# Patient Record
Sex: FEMALE | Race: WHITE | NOT HISPANIC OR LATINO | URBAN - METROPOLITAN AREA
[De-identification: names, ages, dates, MRNs, and addresses within clinical notes are randomized per-mention and may not be internally consistent; named-entity substitution may affect disease eponyms.]

---

## 2017-01-26 ENCOUNTER — OUTPATIENT (OUTPATIENT)
Dept: OUTPATIENT SERVICES | Facility: HOSPITAL | Age: 57
LOS: 1 days | Discharge: HOME | End: 2017-01-26

## 2017-01-26 DIAGNOSIS — C50.919 MALIGNANT NEOPLASM OF UNSPECIFIED SITE OF UNSPECIFIED FEMALE BREAST: ICD-10-CM

## 2017-01-29 ENCOUNTER — FORM ENCOUNTER (OUTPATIENT)
Age: 57
End: 2017-01-29

## 2017-01-29 ENCOUNTER — RESULT REVIEW (OUTPATIENT)
Age: 57
End: 2017-01-29

## 2017-01-30 ENCOUNTER — APPOINTMENT (OUTPATIENT)
Dept: INFUSION THERAPY | Facility: HOSPITAL | Age: 57
End: 2017-01-30

## 2017-01-30 ENCOUNTER — OTHER (OUTPATIENT)
Age: 57
End: 2017-01-30

## 2017-01-30 ENCOUNTER — APPOINTMENT (OUTPATIENT)
Dept: MRI IMAGING | Facility: IMAGING CENTER | Age: 57
End: 2017-01-30

## 2017-01-30 ENCOUNTER — OUTPATIENT (OUTPATIENT)
Dept: OUTPATIENT SERVICES | Facility: HOSPITAL | Age: 57
LOS: 1 days | End: 2017-01-30
Payer: COMMERCIAL

## 2017-01-30 DIAGNOSIS — C50.919 MALIGNANT NEOPLASM OF UNSPECIFIED SITE OF UNSPECIFIED FEMALE BREAST: ICD-10-CM

## 2017-01-30 LAB
HCT VFR BLD CALC: 34 % — LOW (ref 34.5–45)
HGB BLD-MCNC: 12.1 G/DL — SIGNIFICANT CHANGE UP (ref 11.5–15.5)
MCHC RBC-ENTMCNC: 31 PG — SIGNIFICANT CHANGE UP (ref 27–34)
MCHC RBC-ENTMCNC: 35.5 GM/DL — SIGNIFICANT CHANGE UP (ref 32–36)
MCV RBC AUTO: 87.4 FL — SIGNIFICANT CHANGE UP (ref 80–100)
PLATELET # BLD AUTO: 485 K/UL — HIGH (ref 150–400)
RBC # BLD: 3.9 M/UL — SIGNIFICANT CHANGE UP (ref 3.8–5.2)
RBC # FLD: 12.2 % — SIGNIFICANT CHANGE UP (ref 10.3–14.5)
WBC # BLD: 7.1 K/UL — SIGNIFICANT CHANGE UP (ref 3.8–10.5)
WBC # FLD AUTO: 7.1 K/UL — SIGNIFICANT CHANGE UP (ref 3.8–10.5)

## 2017-01-30 PROCEDURE — C8937: CPT

## 2017-01-30 PROCEDURE — A9585: CPT

## 2017-01-30 PROCEDURE — C8908: CPT

## 2017-01-31 DIAGNOSIS — M81.0 AGE-RELATED OSTEOPOROSIS WITHOUT CURRENT PATHOLOGICAL FRACTURE: ICD-10-CM

## 2017-01-31 DIAGNOSIS — M81.8 OTHER OSTEOPOROSIS WITHOUT CURRENT PATHOLOGICAL FRACTURE: ICD-10-CM

## 2017-10-20 ENCOUNTER — OUTPATIENT (OUTPATIENT)
Dept: OUTPATIENT SERVICES | Facility: HOSPITAL | Age: 57
LOS: 1 days | Discharge: HOME | End: 2017-10-20

## 2017-10-20 DIAGNOSIS — C50.919 MALIGNANT NEOPLASM OF UNSPECIFIED SITE OF UNSPECIFIED FEMALE BREAST: ICD-10-CM

## 2017-10-20 DIAGNOSIS — M81.0 AGE-RELATED OSTEOPOROSIS WITHOUT CURRENT PATHOLOGICAL FRACTURE: ICD-10-CM

## 2017-10-26 ENCOUNTER — APPOINTMENT (OUTPATIENT)
Dept: HEMATOLOGY ONCOLOGY | Facility: CLINIC | Age: 57
End: 2017-10-26
Payer: COMMERCIAL

## 2017-10-26 ENCOUNTER — RESULT REVIEW (OUTPATIENT)
Age: 57
End: 2017-10-26

## 2017-10-26 VITALS
WEIGHT: 116.4 LBS | OXYGEN SATURATION: 97 % | SYSTOLIC BLOOD PRESSURE: 136 MMHG | HEART RATE: 81 BPM | TEMPERATURE: 97.4 F | BODY MASS INDEX: 21.15 KG/M2 | HEIGHT: 62.28 IN | DIASTOLIC BLOOD PRESSURE: 79 MMHG | RESPIRATION RATE: 16 BRPM

## 2017-10-26 LAB
HCT VFR BLD CALC: 35.7 % — SIGNIFICANT CHANGE UP (ref 34.5–45)
HGB BLD-MCNC: 12.3 G/DL — SIGNIFICANT CHANGE UP (ref 11.5–15.5)
MCHC RBC-ENTMCNC: 31.9 PG — SIGNIFICANT CHANGE UP (ref 27–34)
MCHC RBC-ENTMCNC: 34.5 G/DL — SIGNIFICANT CHANGE UP (ref 32–36)
MCV RBC AUTO: 92.4 FL — SIGNIFICANT CHANGE UP (ref 80–100)
PLATELET # BLD AUTO: 205 K/UL — SIGNIFICANT CHANGE UP (ref 150–400)
RBC # BLD: 3.86 M/UL — SIGNIFICANT CHANGE UP (ref 3.8–5.2)
RBC # FLD: 11.5 % — SIGNIFICANT CHANGE UP (ref 10.3–14.5)
WBC # BLD: 9.2 K/UL — SIGNIFICANT CHANGE UP (ref 3.8–10.5)
WBC # FLD AUTO: 9.2 K/UL — SIGNIFICANT CHANGE UP (ref 3.8–10.5)

## 2017-10-26 PROCEDURE — 99214 OFFICE O/P EST MOD 30 MIN: CPT

## 2017-10-27 LAB
ALBUMIN SERPL ELPH-MCNC: 4.5 G/DL
ALP BLD-CCNC: 65 U/L
ALT SERPL-CCNC: 16 U/L
ANION GAP SERPL CALC-SCNC: 16 MMOL/L
AST SERPL-CCNC: 23 U/L
BILIRUB SERPL-MCNC: <0.2 MG/DL
BUN SERPL-MCNC: 13 MG/DL
CALCIUM SERPL-MCNC: 9.2 MG/DL
CANCER AG27-29 SERPL-ACNC: 32.9 U/ML
CEA SERPL-MCNC: 6.6 NG/ML
CHLORIDE SERPL-SCNC: 98 MMOL/L
CO2 SERPL-SCNC: 28 MMOL/L
CREAT SERPL-MCNC: 1.38 MG/DL
GLUCOSE SERPL-MCNC: 119 MG/DL
POTASSIUM SERPL-SCNC: 4.8 MMOL/L
PROT SERPL-MCNC: 7.3 G/DL
SODIUM SERPL-SCNC: 142 MMOL/L

## 2017-10-30 ENCOUNTER — APPOINTMENT (OUTPATIENT)
Dept: HEMATOLOGY ONCOLOGY | Facility: CLINIC | Age: 57
End: 2017-10-30

## 2017-10-30 ENCOUNTER — RESULT REVIEW (OUTPATIENT)
Age: 57
End: 2017-10-30

## 2017-10-30 LAB
HCT VFR BLD CALC: 38.2 % — SIGNIFICANT CHANGE UP (ref 34.5–45)
HGB BLD-MCNC: 13.5 G/DL — SIGNIFICANT CHANGE UP (ref 11.5–15.5)
MCHC RBC-ENTMCNC: 32.7 PG — SIGNIFICANT CHANGE UP (ref 27–34)
MCHC RBC-ENTMCNC: 35.3 G/DL — SIGNIFICANT CHANGE UP (ref 32–36)
MCV RBC AUTO: 92.6 FL — SIGNIFICANT CHANGE UP (ref 80–100)
PLATELET # BLD AUTO: 196 K/UL — SIGNIFICANT CHANGE UP (ref 150–400)
RBC # BLD: 4.13 M/UL — SIGNIFICANT CHANGE UP (ref 3.8–5.2)
RBC # FLD: 12 % — SIGNIFICANT CHANGE UP (ref 10.3–14.5)
WBC # BLD: 7.9 K/UL — SIGNIFICANT CHANGE UP (ref 3.8–10.5)
WBC # FLD AUTO: 7.9 K/UL — SIGNIFICANT CHANGE UP (ref 3.8–10.5)

## 2017-11-06 ENCOUNTER — FORM ENCOUNTER (OUTPATIENT)
Age: 57
End: 2017-11-06

## 2017-11-07 ENCOUNTER — OUTPATIENT (OUTPATIENT)
Dept: OUTPATIENT SERVICES | Facility: HOSPITAL | Age: 57
LOS: 1 days | End: 2017-11-07
Payer: COMMERCIAL

## 2017-11-07 ENCOUNTER — APPOINTMENT (OUTPATIENT)
Dept: CT IMAGING | Facility: IMAGING CENTER | Age: 57
End: 2017-11-07
Payer: COMMERCIAL

## 2017-11-07 DIAGNOSIS — C50.919 MALIGNANT NEOPLASM OF UNSPECIFIED SITE OF UNSPECIFIED FEMALE BREAST: ICD-10-CM

## 2017-11-07 PROCEDURE — 74177 CT ABD & PELVIS W/CONTRAST: CPT

## 2017-11-07 PROCEDURE — 74177 CT ABD & PELVIS W/CONTRAST: CPT | Mod: 26

## 2017-11-07 PROCEDURE — 71260 CT THORAX DX C+: CPT | Mod: 26

## 2017-11-07 PROCEDURE — 71260 CT THORAX DX C+: CPT

## 2018-03-27 LAB
ANION GAP SERPL CALC-SCNC: 14 MMOL/L
BUN SERPL-MCNC: 18 MG/DL
CALCIUM SERPL-MCNC: 10 MG/DL
CEA SERPL-MCNC: 6.6 NG/ML
CHLORIDE SERPL-SCNC: 99 MMOL/L
CO2 SERPL-SCNC: 27 MMOL/L
CREAT SERPL-MCNC: 1.1 MG/DL
GLUCOSE SERPL-MCNC: 98 MG/DL
POTASSIUM SERPL-SCNC: 4.4 MMOL/L
SODIUM SERPL-SCNC: 140 MMOL/L

## 2018-04-06 ENCOUNTER — OUTPATIENT (OUTPATIENT)
Dept: OUTPATIENT SERVICES | Facility: HOSPITAL | Age: 58
LOS: 1 days | Discharge: HOME | End: 2018-04-06

## 2018-04-06 DIAGNOSIS — C50.919 MALIGNANT NEOPLASM OF UNSPECIFIED SITE OF UNSPECIFIED FEMALE BREAST: ICD-10-CM

## 2018-04-10 ENCOUNTER — APPOINTMENT (OUTPATIENT)
Dept: INFUSION THERAPY | Facility: HOSPITAL | Age: 58
End: 2018-04-10

## 2018-04-11 DIAGNOSIS — M81.0 AGE-RELATED OSTEOPOROSIS WITHOUT CURRENT PATHOLOGICAL FRACTURE: ICD-10-CM

## 2018-06-13 ENCOUNTER — FORM ENCOUNTER (OUTPATIENT)
Age: 58
End: 2018-06-13

## 2018-06-14 ENCOUNTER — APPOINTMENT (OUTPATIENT)
Dept: MAMMOGRAPHY | Facility: IMAGING CENTER | Age: 58
End: 2018-06-14
Payer: COMMERCIAL

## 2018-06-14 ENCOUNTER — OUTPATIENT (OUTPATIENT)
Dept: OUTPATIENT SERVICES | Facility: HOSPITAL | Age: 58
LOS: 1 days | End: 2018-06-14
Payer: COMMERCIAL

## 2018-06-14 ENCOUNTER — APPOINTMENT (OUTPATIENT)
Dept: ULTRASOUND IMAGING | Facility: IMAGING CENTER | Age: 58
End: 2018-06-14
Payer: COMMERCIAL

## 2018-06-14 DIAGNOSIS — C50.919 MALIGNANT NEOPLASM OF UNSPECIFIED SITE OF UNSPECIFIED FEMALE BREAST: ICD-10-CM

## 2018-06-14 PROCEDURE — 76641 ULTRASOUND BREAST COMPLETE: CPT | Mod: 26,50

## 2018-06-14 PROCEDURE — G0279: CPT

## 2018-06-14 PROCEDURE — 77066 DX MAMMO INCL CAD BI: CPT | Mod: 26

## 2018-06-14 PROCEDURE — 77066 DX MAMMO INCL CAD BI: CPT

## 2018-06-14 PROCEDURE — 76641 ULTRASOUND BREAST COMPLETE: CPT

## 2018-06-14 PROCEDURE — G0279: CPT | Mod: 26

## 2019-01-30 ENCOUNTER — INPATIENT (INPATIENT)
Facility: HOSPITAL | Age: 59
LOS: 3 days | Discharge: HOME | DRG: 392 | End: 2019-02-03
Attending: HOSPITALIST | Admitting: HOSPITALIST
Payer: COMMERCIAL

## 2019-01-30 VITALS
RESPIRATION RATE: 18 BRPM | OXYGEN SATURATION: 97 % | DIASTOLIC BLOOD PRESSURE: 80 MMHG | TEMPERATURE: 98 F | SYSTOLIC BLOOD PRESSURE: 124 MMHG | HEART RATE: 88 BPM | WEIGHT: 110.01 LBS

## 2019-01-30 DIAGNOSIS — F17.200 NICOTINE DEPENDENCE, UNSPECIFIED, UNCOMPLICATED: ICD-10-CM

## 2019-01-30 DIAGNOSIS — N32.9 BLADDER DISORDER, UNSPECIFIED: ICD-10-CM

## 2019-01-30 DIAGNOSIS — K57.92 DIVERTICULITIS OF INTESTINE, PART UNSPECIFIED, WITHOUT PERFORATION OR ABSCESS WITHOUT BLEEDING: ICD-10-CM

## 2019-01-30 DIAGNOSIS — Z87.09 PERSONAL HISTORY OF OTHER DISEASES OF THE RESPIRATORY SYSTEM: ICD-10-CM

## 2019-01-30 LAB
ALBUMIN SERPL ELPH-MCNC: 4.4 G/DL — SIGNIFICANT CHANGE UP (ref 3.3–5)
ALP SERPL-CCNC: 74 U/L — SIGNIFICANT CHANGE UP (ref 40–120)
ALT FLD-CCNC: 12 U/L — SIGNIFICANT CHANGE UP (ref 10–45)
ANION GAP SERPL CALC-SCNC: 10 MMOL/L — SIGNIFICANT CHANGE UP (ref 5–17)
APPEARANCE UR: CLEAR — SIGNIFICANT CHANGE UP
AST SERPL-CCNC: 20 U/L — SIGNIFICANT CHANGE UP (ref 10–40)
BACTERIA # UR AUTO: NEGATIVE — SIGNIFICANT CHANGE UP
BASOPHILS # BLD AUTO: 0.1 K/UL — SIGNIFICANT CHANGE UP (ref 0–0.2)
BASOPHILS NFR BLD AUTO: 0.8 % — SIGNIFICANT CHANGE UP (ref 0–2)
BILIRUB SERPL-MCNC: 0.2 MG/DL — SIGNIFICANT CHANGE UP (ref 0.2–1.2)
BILIRUB UR-MCNC: NEGATIVE — SIGNIFICANT CHANGE UP
BUN SERPL-MCNC: 12 MG/DL — SIGNIFICANT CHANGE UP (ref 7–23)
CALCIUM SERPL-MCNC: 9.2 MG/DL — SIGNIFICANT CHANGE UP (ref 8.4–10.5)
CHLORIDE SERPL-SCNC: 99 MMOL/L — SIGNIFICANT CHANGE UP (ref 96–108)
CO2 SERPL-SCNC: 26 MMOL/L — SIGNIFICANT CHANGE UP (ref 22–31)
COLOR SPEC: SIGNIFICANT CHANGE UP
CREAT SERPL-MCNC: 1 MG/DL — SIGNIFICANT CHANGE UP (ref 0.5–1.3)
DIFF PNL FLD: NEGATIVE — SIGNIFICANT CHANGE UP
EOSINOPHIL # BLD AUTO: 0.3 K/UL — SIGNIFICANT CHANGE UP (ref 0–0.5)
EOSINOPHIL NFR BLD AUTO: 4 % — SIGNIFICANT CHANGE UP (ref 0–6)
EPI CELLS # UR: 1 /HPF — SIGNIFICANT CHANGE UP
GAS PNL BLDV: SIGNIFICANT CHANGE UP
GLUCOSE SERPL-MCNC: 110 MG/DL — HIGH (ref 70–99)
GLUCOSE UR QL: NEGATIVE — SIGNIFICANT CHANGE UP
HCT VFR BLD CALC: 37.7 % — SIGNIFICANT CHANGE UP (ref 34.5–45)
HGB BLD-MCNC: 13 G/DL — SIGNIFICANT CHANGE UP (ref 11.5–15.5)
HYALINE CASTS # UR AUTO: 0 /LPF — SIGNIFICANT CHANGE UP (ref 0–2)
KETONES UR-MCNC: NEGATIVE — SIGNIFICANT CHANGE UP
LEUKOCYTE ESTERASE UR-ACNC: NEGATIVE — SIGNIFICANT CHANGE UP
LIDOCAIN IGE QN: 38 U/L — SIGNIFICANT CHANGE UP (ref 7–60)
LYMPHOCYTES # BLD AUTO: 1.8 K/UL — SIGNIFICANT CHANGE UP (ref 1–3.3)
LYMPHOCYTES # BLD AUTO: 20.8 % — SIGNIFICANT CHANGE UP (ref 13–44)
MCHC RBC-ENTMCNC: 31.8 PG — SIGNIFICANT CHANGE UP (ref 27–34)
MCHC RBC-ENTMCNC: 34.5 GM/DL — SIGNIFICANT CHANGE UP (ref 32–36)
MCV RBC AUTO: 92.1 FL — SIGNIFICANT CHANGE UP (ref 80–100)
MONOCYTES # BLD AUTO: 0.8 K/UL — SIGNIFICANT CHANGE UP (ref 0–0.9)
MONOCYTES NFR BLD AUTO: 8.6 % — SIGNIFICANT CHANGE UP (ref 2–14)
NEUTROPHILS # BLD AUTO: 5.7 K/UL — SIGNIFICANT CHANGE UP (ref 1.8–7.4)
NEUTROPHILS NFR BLD AUTO: 65.8 % — SIGNIFICANT CHANGE UP (ref 43–77)
NITRITE UR-MCNC: NEGATIVE — SIGNIFICANT CHANGE UP
PH UR: 8 — SIGNIFICANT CHANGE UP (ref 5–8)
PLATELET # BLD AUTO: 239 K/UL — SIGNIFICANT CHANGE UP (ref 150–400)
POTASSIUM SERPL-MCNC: 4.1 MMOL/L — SIGNIFICANT CHANGE UP (ref 3.5–5.3)
POTASSIUM SERPL-SCNC: 4.1 MMOL/L — SIGNIFICANT CHANGE UP (ref 3.5–5.3)
PROT SERPL-MCNC: 7.4 G/DL — SIGNIFICANT CHANGE UP (ref 6–8.3)
PROT UR-MCNC: SIGNIFICANT CHANGE UP
RBC # BLD: 4.1 M/UL — SIGNIFICANT CHANGE UP (ref 3.8–5.2)
RBC # FLD: 11.7 % — SIGNIFICANT CHANGE UP (ref 10.3–14.5)
RBC CASTS # UR COMP ASSIST: 0 /HPF — SIGNIFICANT CHANGE UP (ref 0–4)
SODIUM SERPL-SCNC: 135 MMOL/L — SIGNIFICANT CHANGE UP (ref 135–145)
SP GR SPEC: 1.04 — HIGH (ref 1.01–1.02)
UROBILINOGEN FLD QL: NEGATIVE — SIGNIFICANT CHANGE UP
WBC # BLD: 8.7 K/UL — SIGNIFICANT CHANGE UP (ref 3.8–10.5)
WBC # FLD AUTO: 8.7 K/UL — SIGNIFICANT CHANGE UP (ref 3.8–10.5)
WBC UR QL: 0 /HPF — SIGNIFICANT CHANGE UP (ref 0–5)

## 2019-01-30 PROCEDURE — 99223 1ST HOSP IP/OBS HIGH 75: CPT

## 2019-01-30 PROCEDURE — 99285 EMERGENCY DEPT VISIT HI MDM: CPT | Mod: 25

## 2019-01-30 PROCEDURE — 71046 X-RAY EXAM CHEST 2 VIEWS: CPT | Mod: 26

## 2019-01-30 PROCEDURE — 74177 CT ABD & PELVIS W/CONTRAST: CPT | Mod: 26

## 2019-01-30 RX ORDER — SERTRALINE 25 MG/1
200 TABLET, FILM COATED ORAL DAILY
Qty: 0 | Refills: 0 | Status: DISCONTINUED | OUTPATIENT
Start: 2019-01-30 | End: 2019-02-03

## 2019-01-30 RX ORDER — SERTRALINE 25 MG/1
1 TABLET, FILM COATED ORAL
Qty: 0 | Refills: 0 | COMMUNITY

## 2019-01-30 RX ORDER — CIPROFLOXACIN LACTATE 400MG/40ML
200 VIAL (ML) INTRAVENOUS EVERY 12 HOURS
Qty: 0 | Refills: 0 | Status: DISCONTINUED | OUTPATIENT
Start: 2019-01-30 | End: 2019-02-03

## 2019-01-30 RX ORDER — MORPHINE SULFATE 50 MG/1
2 CAPSULE, EXTENDED RELEASE ORAL EVERY 4 HOURS
Qty: 0 | Refills: 0 | Status: DISCONTINUED | OUTPATIENT
Start: 2019-01-30 | End: 2019-02-03

## 2019-01-30 RX ORDER — ALPRAZOLAM 0.25 MG
1 TABLET ORAL
Qty: 0 | Refills: 0 | COMMUNITY

## 2019-01-30 RX ORDER — KETOROLAC TROMETHAMINE 30 MG/ML
15 SYRINGE (ML) INJECTION ONCE
Qty: 0 | Refills: 0 | Status: DISCONTINUED | OUTPATIENT
Start: 2019-01-30 | End: 2019-01-30

## 2019-01-30 RX ORDER — SODIUM CHLORIDE 9 MG/ML
1000 INJECTION INTRAMUSCULAR; INTRAVENOUS; SUBCUTANEOUS ONCE
Qty: 0 | Refills: 0 | Status: COMPLETED | OUTPATIENT
Start: 2019-01-30 | End: 2019-01-30

## 2019-01-30 RX ORDER — METRONIDAZOLE 500 MG
500 TABLET ORAL EVERY 8 HOURS
Qty: 0 | Refills: 0 | Status: DISCONTINUED | OUTPATIENT
Start: 2019-01-30 | End: 2019-02-03

## 2019-01-30 RX ORDER — ACETAMINOPHEN 500 MG
975 TABLET ORAL ONCE
Qty: 0 | Refills: 0 | Status: COMPLETED | OUTPATIENT
Start: 2019-01-30 | End: 2019-01-30

## 2019-01-30 RX ORDER — SODIUM CHLORIDE 9 MG/ML
1000 INJECTION INTRAMUSCULAR; INTRAVENOUS; SUBCUTANEOUS
Qty: 0 | Refills: 0 | Status: DISCONTINUED | OUTPATIENT
Start: 2019-01-30 | End: 2019-02-02

## 2019-01-30 RX ORDER — HEPARIN SODIUM 5000 [USP'U]/ML
5000 INJECTION INTRAVENOUS; SUBCUTANEOUS EVERY 12 HOURS
Qty: 0 | Refills: 0 | Status: DISCONTINUED | OUTPATIENT
Start: 2019-01-30 | End: 2019-02-03

## 2019-01-30 RX ORDER — ANASTROZOLE 1 MG/1
1 TABLET ORAL DAILY
Qty: 0 | Refills: 0 | Status: DISCONTINUED | OUTPATIENT
Start: 2019-01-30 | End: 2019-02-03

## 2019-01-30 RX ORDER — MORPHINE SULFATE 50 MG/1
4 CAPSULE, EXTENDED RELEASE ORAL ONCE
Qty: 0 | Refills: 0 | Status: DISCONTINUED | OUTPATIENT
Start: 2019-01-30 | End: 2019-01-30

## 2019-01-30 RX ORDER — NICOTINE POLACRILEX 2 MG
1 GUM BUCCAL DAILY
Qty: 0 | Refills: 0 | Status: DISCONTINUED | OUTPATIENT
Start: 2019-01-30 | End: 2019-02-03

## 2019-01-30 RX ORDER — CIPROFLOXACIN LACTATE 400MG/40ML
400 VIAL (ML) INTRAVENOUS ONCE
Qty: 0 | Refills: 0 | Status: COMPLETED | OUTPATIENT
Start: 2019-01-30 | End: 2019-01-30

## 2019-01-30 RX ORDER — METRONIDAZOLE 500 MG
500 TABLET ORAL ONCE
Qty: 0 | Refills: 0 | Status: COMPLETED | OUTPATIENT
Start: 2019-01-30 | End: 2019-01-30

## 2019-01-30 RX ORDER — ACETAMINOPHEN 500 MG
650 TABLET ORAL EVERY 6 HOURS
Qty: 0 | Refills: 0 | Status: DISCONTINUED | OUTPATIENT
Start: 2019-01-30 | End: 2019-02-03

## 2019-01-30 RX ORDER — FAMOTIDINE 10 MG/ML
20 INJECTION INTRAVENOUS DAILY
Qty: 0 | Refills: 0 | Status: DISCONTINUED | OUTPATIENT
Start: 2019-01-30 | End: 2019-02-03

## 2019-01-30 RX ADMIN — Medication 15 MILLIGRAM(S): at 14:26

## 2019-01-30 RX ADMIN — SODIUM CHLORIDE 2000 MILLILITER(S): 9 INJECTION INTRAMUSCULAR; INTRAVENOUS; SUBCUTANEOUS at 09:02

## 2019-01-30 RX ADMIN — SODIUM CHLORIDE 100 MILLILITER(S): 9 INJECTION INTRAMUSCULAR; INTRAVENOUS; SUBCUTANEOUS at 16:14

## 2019-01-30 RX ADMIN — Medication 15 MILLIGRAM(S): at 15:00

## 2019-01-30 RX ADMIN — Medication 200 MILLIGRAM(S): at 09:57

## 2019-01-30 RX ADMIN — MORPHINE SULFATE 2 MILLIGRAM(S): 50 CAPSULE, EXTENDED RELEASE ORAL at 16:14

## 2019-01-30 RX ADMIN — MORPHINE SULFATE 4 MILLIGRAM(S): 50 CAPSULE, EXTENDED RELEASE ORAL at 09:25

## 2019-01-30 RX ADMIN — MORPHINE SULFATE 4 MILLIGRAM(S): 50 CAPSULE, EXTENDED RELEASE ORAL at 11:37

## 2019-01-30 RX ADMIN — Medication 15 MILLIGRAM(S): at 22:00

## 2019-01-30 RX ADMIN — Medication 100 MILLIGRAM(S): at 09:21

## 2019-01-30 RX ADMIN — Medication 1 PATCH: at 22:01

## 2019-01-30 RX ADMIN — Medication 975 MILLIGRAM(S): at 14:26

## 2019-01-30 RX ADMIN — FAMOTIDINE 20 MILLIGRAM(S): 10 INJECTION INTRAVENOUS at 16:14

## 2019-01-30 RX ADMIN — MORPHINE SULFATE 4 MILLIGRAM(S): 50 CAPSULE, EXTENDED RELEASE ORAL at 12:10

## 2019-01-30 RX ADMIN — Medication 100 MILLIGRAM(S): at 22:01

## 2019-01-30 RX ADMIN — MORPHINE SULFATE 2 MILLIGRAM(S): 50 CAPSULE, EXTENDED RELEASE ORAL at 20:30

## 2019-01-30 RX ADMIN — MORPHINE SULFATE 4 MILLIGRAM(S): 50 CAPSULE, EXTENDED RELEASE ORAL at 09:02

## 2019-01-30 RX ADMIN — Medication 15 MILLIGRAM(S): at 22:15

## 2019-01-30 RX ADMIN — MORPHINE SULFATE 2 MILLIGRAM(S): 50 CAPSULE, EXTENDED RELEASE ORAL at 20:13

## 2019-01-30 RX ADMIN — SODIUM CHLORIDE 100 MILLILITER(S): 9 INJECTION INTRAMUSCULAR; INTRAVENOUS; SUBCUTANEOUS at 20:13

## 2019-01-30 RX ADMIN — Medication 975 MILLIGRAM(S): at 15:00

## 2019-01-30 RX ADMIN — HEPARIN SODIUM 5000 UNIT(S): 5000 INJECTION INTRAVENOUS; SUBCUTANEOUS at 22:01

## 2019-01-30 NOTE — H&P ADULT - ASSESSMENT
NIGHT HOSPITALIST:   Presentation of patient with sigmoid diverticulitis in the setting of patient with moderate tobacco use, breast CA with past MRM and B/L breast implants presumed to be in remission--patient also with an outpatient evaluation for an apparent protracted course for presumed sinusitis with outpatient courses of oral antibiotics>>doubt C. difficile but will obtain stool assay but patient agrees with  treatment with IV Cipro and IV Flagyl.   Patient agrees to quit tobacco and agrees to the Nicoderm patch.    Unclear to the significance of the bladder thickening in the absence of urinary symptoms>>would check U/A and urine culture.  Given tobacco history, would consider outpatient urologic follow up after hospital course.  Would follow up on the chest radiograph (a proper PA/lateral was ordered) with consideration for a noncontrast chest CTT to clarify any abnormal findings.    Patient is also aware to continue with her followup with her outpatient ENT, given patient's ongoing tobacco history.

## 2019-01-30 NOTE — H&P ADULT - NSHPSOURCEINFOTX_GEN_ALL_CORE
Reviewed medication list with patient.  Spouse and adult son in attendance. Reviewed medication list with patient.  Spouse and adult son in attendance.  [facility] # [id number]

## 2019-01-30 NOTE — ED ADULT NURSE NOTE - NS ED NURSE REPORT GIVEN TO FT
Report given to on coming nurse Trudy [id number] vince. Understands pmh, medications given and plan of care for patient. Patient in stable condition, vital signs updated, has no complaints at this time and has been updated on care plan. Explained to patient that it is change of shift and new nurse is taking over, pt verbalized understanding.

## 2019-01-30 NOTE — ED ADULT TRIAGE NOTE - CHIEF COMPLAINT QUOTE
pt c/o "abd pain with diarrhea x 3 days. Had Abd CT scan yesterday from urgent care and dx with diverticulitis."
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
## 2019-01-30 NOTE — ED PROVIDER NOTE - NS ED ROS FT
CONST: no fevers, no chills  CV: no chest pain, no palpitations     RESP: no shortness of breath, no cough  ABD: + abdominal pain, + diarrhea, no vomiting     : + difficulty passing urine due to abd pain, no hematuria     NEURO: no focal weakness or loss of sensation     SKIN:  no rash, no lacerations.   ~ Quinn Ashford MD

## 2019-01-30 NOTE — ED ADULT NURSE REASSESSMENT NOTE - NS ED NURSE REASSESS COMMENT FT1
Called pharmacy to tube over armidex and nicotine patch. Pharmacy said he would send medication shortly. Will medicate per MD orders when medication arrives.
Pt. is resting in bed requesting PO fluids. MD made aware and is going to read results of patient's CT and go over results with patient.
Patient is resting in bed with  at bedside. Pt. is prepared to go to CT. VSS. Pt. reports pain is 2/10 at this time.

## 2019-01-30 NOTE — ED PROVIDER NOTE - ATTENDING CONTRIBUTION TO CARE
Dr. Freitas : I have personally seen and examined this patient at the bedside. I have fully participated in the care of this patient. I have reviewed all pertinent clinical information, including history, physical exam, plan and the Resident's note and agree except as noted.     60yo F hx of sinusitis pw diffuse abd pain but mostly on llq abd pain x 4 days. notes that also has been having right lower back pain. +nausea no vomiting. +diarrhea few days ago now only mucous coming out non-bloody.   Denies f/c//v/cp/sob/palpitations/cough/c/dysuria/hematuria. no sick contacts/recent travel. notes that hard to urinate bc of abdominal pain . went to urgent care yesterday was diagnosed with simple diverticulitis started on bioxin and cipro? (pt not sure) but notes that pain is not getting better and is worse today    PE:  head; atraumatic normocephalic  eyes: perrla  Heart: rrr s1s2  lungs: ctab  abd: soft, nt nd + bs no rebound/guarding no cva ttp  le: no swelling no calf ttp  back: no midline cervical/thoracic/lumbar ttp    -->diverticulitis will fu labs check wbc lactate analgesia possible rescan to ro microperf or abscess; will give IV cipro flagyl--reassess

## 2019-01-30 NOTE — H&P ADULT - HISTORY OF PRESENT ILLNESS
NIGHT HOSPITALIST:   Patient UNKNOWN to me previously, assigned to me at this point via the ER and by Dr. Kearns to admit this 58 y/o F--followed by Dr. Wolfe above in the office--patient with a history of breast CA with RIGHT MRM presumed to be in remission, breast implants, moderate tobacco use since age 15, depression, with patient self referring to the [place] ER following 4 days of colicky b/L lower quadrant abdominal pain with loose mucous BM, chills.   NO el red blood per rectum or melena.   NO chest pain/pressure.   No dyspnoea.   NO cough.  No back pain, no tearing back pain.   No dysuria, no haematuria.   Patient with anorexia for the past few days but denies weight loss.   Patient apparently following an outpatient ENT with planned outpatient nasal endoscopy for persistent presumed sinusitis since December of 2018.   Patient reports that she has been on intermittent antibiotics since December with an attenuated course of oral antibiotics from a local urgent care center. NIGHT HOSPITALIST:   Patient UNKNOWN to me previously, assigned to me at this point via the ER and by Dr. Kearns to admit this 58 y/o F--followed by Dr. Wolfe above in the office--patient with a history of breast CA with RIGHT MRM presumed to be in remission, breast implants, moderate tobacco use since age 15, depression, with patient self referring to the [place] ER following 4 days of colicky b/L lower quadrant abdominal pain with loose mucous BM, chills.   NO el red blood per rectum or melena.   NO chest pain/pressure.   No dyspnoea.   NO cough.  No back pain, no tearing back pain.   No dysuria, no haematuria.   Patient with anorexia for the past few days but denies weight loss.   Patient apparently following an outpatient ENT with planned outpatient nasal endoscopy for persistent presumed sinusitis since December of 2018.   Patient reports that she has been on intermittent antibiotics since December with an attenuated course of oral antibiotics from a local urgent care center.  Remaining review of systems not contributory.

## 2019-01-30 NOTE — ED PROVIDER NOTE - PROGRESS NOTE DETAILS
Quinn Ashford MD: continued to have significant pain despite IV morphine. will pain control and admit for IV atbx and pain control. endorsed to Dr. Kearns

## 2019-01-30 NOTE — ED PROVIDER NOTE - PHYSICAL EXAMINATION
Physical Exam:   GEN: adult F who is in mild distress, AAOx3  HENT: NCAT, MMM, no stridor  EYES: PERRLA, EOMI  RESP: CTAB, no respiratory distress  CV: normal rate and regular rhythm, S1 S2  ABD: soft and no rigidity, + TTP to the LLQ without rebound  : + mild R CVA tenderness  EXT: No edema, No bony deformity of extremities  SKIN: No skin breaks, skin color normal for race  NEURO: CN grossly intact, No focal motor or sensory deficits.   ~ Quinn Ashford MD

## 2019-01-30 NOTE — H&P ADULT - ATTENDING COMMENTS
NIGHT HOSPITALIST:  Patient/ spouse /adult son in attendance aware of course and agree with plan/care as above.  Emotional support provided to patient.  Care reviewed with covering NP/PA.   Care assumed by the DAY HOSPITALIST.    Jose Rucker MD  [phone]

## 2019-01-30 NOTE — H&P ADULT - NSHPLABSRESULTS_GEN_ALL_CORE
WBC 8.7.  Hgb 13.0.  Platelets of 239K>  Random glucose of 110.  Cr 1.0.  K+ 4.1.  Alb 4.4.   Lipase 38.  Chest radiograph ordered.    CTT with B/L breast implants, calcified granuloma LEFT lower lobe, sigmoid diverticulitis, thickening bladder.  U/A sent. WBC 8.7.  Hgb 13.0.  Platelets of 239K>  Random glucose of 110.  Cr 1.0.  K+ 4.1.  Alb 4.4.   Lipase 38.  Chest radiograph ordered.    CTT with B/L breast implants, calcified granuloma LEFT lower lobe, sigmoid diverticulitis, thickening bladder.  U/A sent.    EKG tracing reviewed with NSR at 67 with Q V2 and nonspecific T inversions V1-V4 (asymptomatic and no old EKG available for comparison).

## 2019-01-30 NOTE — H&P ADULT - NSHPPHYSICALEXAM_GEN_ALL_CORE
Physical exam with a middle aged F, appears older than stated age and chronically ill.  Afebrile.   HR  73   RR 14  /76  98% on RA.  HEENT< PERRL< EOMI, neck supple, chest clear, cor s1 s2, declined breast exam.  Abdomen soft normal bowel sounds, no rebound, mild B/L LE tenderness on deep palpation.  Skin clear, Ext no deformities (patient declined to remove socks and shoes).  Neurologic exam AxOx3.  Speech fluent.  cognition intact.  UE/LE 5/5.   NO SI or HI.  Affect neutral.

## 2019-01-30 NOTE — ED ADULT NURSE NOTE - OBJECTIVE STATEMENT
59 year old female presents to the ED ambulatory through waiting room with  complaining of 4 days of lower abdominal pain, L>R, and R flank pain, constant in nature with spasms. PMH of breast CA s/p r partial mastectomy, depression. Pt. reportedly came to ED on Monday but did not want to wait to be seen, went to [facility] yesterday and had blood work and imaging, was diagnosed with pancreatitis and was discharged with PO abx - which she has taken 2 doses of. Pt. denies any improvement. on assessment, patient's abdomen is soft, nondistended, tender to palpation in lower quadrants and also R CVA tenderness. Pt. denies fever, chills, nausea, vomiting, diarrhea, dysuria, hematuria, recent travel, recent sick contacts. 18g peripheral IV placed in L ac and labs drawn and sent to lab. Patient undressed and placed into gown, call bell in hand and side rails up with bed in lowest position for safety. blanket provided. Comfort and safety provided.

## 2019-01-30 NOTE — ED PROVIDER NOTE - MEDICAL DECISION MAKING DETAILS
Quinn Ashford MD: mild worsening of lower abd pain w/ nonbloody diarrhea w/ known diverticulitis on CT scan from urgent care. TTP to the LLQ. mild R CVA, will get UA to assess for  pathology, but may be referred pain from diverticulitis. No peritoneal signs concerning for rupture, but if continues to have worsening pain in ED course, will consider repeat scan for complicated divertic.

## 2019-01-30 NOTE — H&P ADULT - PROBLEM SELECTOR PLAN 3
See above.   Would follow up See above.   Would follow up UA, urine culture--if nondiagnostic would consider prior urologic workup.

## 2019-01-30 NOTE — ED PROVIDER NOTE - OBJECTIVE STATEMENT
Quinn Ashford MD: 59 F w/ Hx of anxiety, HLD, breast cancer s/p mastectomy on R, who p/w lower abd pain and diarrhea for last 4 days. Pt reports that she has pain across the lower abdomen, constant, worsening mildly despite 2 doses of atbx. Pt was seen at [facility] MD and had blood work drawn and CT scan that was done that showed "diverticulitis" but no report is available, started on Biaxin as pt w/ GI upset from taking Augmentin for sinusitis in the past. Pt reports diarrhea that is mucusy, but nonwatery, nonbloody.

## 2019-01-30 NOTE — ED ADULT NURSE NOTE - CHPI ED NUR SYMPTOMS NEG
no blood in stool/no vomiting/no chills/no hematuria/no abdominal distension/no diarrhea/no nausea/no dysuria/no fever

## 2019-01-30 NOTE — ED ADULT NURSE NOTE - NSIMPLEMENTINTERV_GEN_ALL_ED
Implemented All Universal Safety Interventions:  [place] to call system. Call bell, personal items and telephone within reach. Instruct patient to call for assistance. Room bathroom lighting operational. Non-slip footwear when patient is off stretcher. Physically safe environment: no spills, clutter or unnecessary equipment. Stretcher in lowest position, wheels locked, appropriate side rails in place.

## 2019-01-31 LAB
ANION GAP SERPL CALC-SCNC: 10 MMOL/L — SIGNIFICANT CHANGE UP (ref 5–17)
BASOPHILS # BLD AUTO: 0.1 K/UL — SIGNIFICANT CHANGE UP (ref 0–0.2)
BASOPHILS NFR BLD AUTO: 1.6 % — SIGNIFICANT CHANGE UP (ref 0–2)
BUN SERPL-MCNC: 7 MG/DL — SIGNIFICANT CHANGE UP (ref 7–23)
C DIFF GDH STL QL: NEGATIVE — SIGNIFICANT CHANGE UP
C DIFF GDH STL QL: SIGNIFICANT CHANGE UP
CALCIUM SERPL-MCNC: 8.2 MG/DL — LOW (ref 8.4–10.5)
CHLORIDE SERPL-SCNC: 107 MMOL/L — SIGNIFICANT CHANGE UP (ref 96–108)
CO2 SERPL-SCNC: 24 MMOL/L — SIGNIFICANT CHANGE UP (ref 22–31)
CREAT SERPL-MCNC: 0.93 MG/DL — SIGNIFICANT CHANGE UP (ref 0.5–1.3)
CULTURE RESULTS: NO GROWTH — SIGNIFICANT CHANGE UP
CULTURE RESULTS: SIGNIFICANT CHANGE UP
EOSINOPHIL # BLD AUTO: 0.2 K/UL — SIGNIFICANT CHANGE UP (ref 0–0.5)
EOSINOPHIL NFR BLD AUTO: 3 % — SIGNIFICANT CHANGE UP (ref 0–6)
GLUCOSE SERPL-MCNC: 94 MG/DL — SIGNIFICANT CHANGE UP (ref 70–99)
HCT VFR BLD CALC: 34.2 % — LOW (ref 34.5–45)
HGB BLD-MCNC: 11.5 G/DL — SIGNIFICANT CHANGE UP (ref 11.5–15.5)
LYMPHOCYTES # BLD AUTO: 2.8 K/UL — SIGNIFICANT CHANGE UP (ref 1–3.3)
LYMPHOCYTES # BLD AUTO: 41.1 % — SIGNIFICANT CHANGE UP (ref 13–44)
MCHC RBC-ENTMCNC: 31 PG — SIGNIFICANT CHANGE UP (ref 27–34)
MCHC RBC-ENTMCNC: 33.6 GM/DL — SIGNIFICANT CHANGE UP (ref 32–36)
MCV RBC AUTO: 92.2 FL — SIGNIFICANT CHANGE UP (ref 80–100)
MONOCYTES # BLD AUTO: 0.7 K/UL — SIGNIFICANT CHANGE UP (ref 0–0.9)
MONOCYTES NFR BLD AUTO: 10.6 % — SIGNIFICANT CHANGE UP (ref 2–14)
NEUTROPHILS # BLD AUTO: 3 K/UL — SIGNIFICANT CHANGE UP (ref 1.8–7.4)
NEUTROPHILS NFR BLD AUTO: 43.6 % — SIGNIFICANT CHANGE UP (ref 43–77)
PLATELET # BLD AUTO: 209 K/UL — SIGNIFICANT CHANGE UP (ref 150–400)
POTASSIUM SERPL-MCNC: 3.9 MMOL/L — SIGNIFICANT CHANGE UP (ref 3.5–5.3)
POTASSIUM SERPL-SCNC: 3.9 MMOL/L — SIGNIFICANT CHANGE UP (ref 3.5–5.3)
RBC # BLD: 3.71 M/UL — LOW (ref 3.8–5.2)
RBC # FLD: 11.5 % — SIGNIFICANT CHANGE UP (ref 10.3–14.5)
SODIUM SERPL-SCNC: 141 MMOL/L — SIGNIFICANT CHANGE UP (ref 135–145)
SPECIMEN SOURCE: SIGNIFICANT CHANGE UP
SPECIMEN SOURCE: SIGNIFICANT CHANGE UP
WBC # BLD: 6.9 K/UL — SIGNIFICANT CHANGE UP (ref 3.8–10.5)
WBC # FLD AUTO: 6.9 K/UL — SIGNIFICANT CHANGE UP (ref 3.8–10.5)

## 2019-01-31 RX ORDER — ALPRAZOLAM 0.25 MG
0.5 TABLET ORAL ONCE
Qty: 0 | Refills: 0 | Status: DISCONTINUED | OUTPATIENT
Start: 2019-01-31 | End: 2019-01-31

## 2019-01-31 RX ORDER — ACETAMINOPHEN 500 MG
750 TABLET ORAL ONCE
Qty: 0 | Refills: 0 | Status: COMPLETED | OUTPATIENT
Start: 2019-01-31 | End: 2019-01-31

## 2019-01-31 RX ADMIN — Medication 1 PATCH: at 13:13

## 2019-01-31 RX ADMIN — Medication 100 MILLIGRAM(S): at 11:13

## 2019-01-31 RX ADMIN — SODIUM CHLORIDE 100 MILLILITER(S): 9 INJECTION INTRAMUSCULAR; INTRAVENOUS; SUBCUTANEOUS at 11:13

## 2019-01-31 RX ADMIN — MORPHINE SULFATE 2 MILLIGRAM(S): 50 CAPSULE, EXTENDED RELEASE ORAL at 17:50

## 2019-01-31 RX ADMIN — Medication 100 MILLIGRAM(S): at 22:07

## 2019-01-31 RX ADMIN — SODIUM CHLORIDE 100 MILLILITER(S): 9 INJECTION INTRAMUSCULAR; INTRAVENOUS; SUBCUTANEOUS at 22:07

## 2019-01-31 RX ADMIN — HEPARIN SODIUM 5000 UNIT(S): 5000 INJECTION INTRAVENOUS; SUBCUTANEOUS at 22:07

## 2019-01-31 RX ADMIN — MORPHINE SULFATE 2 MILLIGRAM(S): 50 CAPSULE, EXTENDED RELEASE ORAL at 13:08

## 2019-01-31 RX ADMIN — Medication 1 PATCH: at 18:00

## 2019-01-31 RX ADMIN — FAMOTIDINE 20 MILLIGRAM(S): 10 INJECTION INTRAVENOUS at 13:07

## 2019-01-31 RX ADMIN — ANASTROZOLE 1 MILLIGRAM(S): 1 TABLET ORAL at 13:07

## 2019-01-31 RX ADMIN — MORPHINE SULFATE 2 MILLIGRAM(S): 50 CAPSULE, EXTENDED RELEASE ORAL at 22:07

## 2019-01-31 RX ADMIN — MORPHINE SULFATE 2 MILLIGRAM(S): 50 CAPSULE, EXTENDED RELEASE ORAL at 09:25

## 2019-01-31 RX ADMIN — Medication 100 MILLIGRAM(S): at 05:54

## 2019-01-31 RX ADMIN — HEPARIN SODIUM 5000 UNIT(S): 5000 INJECTION INTRAVENOUS; SUBCUTANEOUS at 11:14

## 2019-01-31 RX ADMIN — MORPHINE SULFATE 2 MILLIGRAM(S): 50 CAPSULE, EXTENDED RELEASE ORAL at 13:38

## 2019-01-31 RX ADMIN — SERTRALINE 200 MILLIGRAM(S): 25 TABLET, FILM COATED ORAL at 13:07

## 2019-01-31 RX ADMIN — MORPHINE SULFATE 2 MILLIGRAM(S): 50 CAPSULE, EXTENDED RELEASE ORAL at 17:20

## 2019-01-31 RX ADMIN — MORPHINE SULFATE 2 MILLIGRAM(S): 50 CAPSULE, EXTENDED RELEASE ORAL at 09:00

## 2019-01-31 RX ADMIN — MORPHINE SULFATE 2 MILLIGRAM(S): 50 CAPSULE, EXTENDED RELEASE ORAL at 22:22

## 2019-01-31 RX ADMIN — Medication 300 MILLIGRAM(S): at 03:14

## 2019-01-31 RX ADMIN — Medication 0.5 MILLIGRAM(S): at 23:16

## 2019-01-31 RX ADMIN — Medication 1 PATCH: at 21:16

## 2019-01-31 RX ADMIN — Medication 100 MILLIGRAM(S): at 13:07

## 2019-01-31 RX ADMIN — Medication 750 MILLIGRAM(S): at 03:30

## 2019-01-31 NOTE — DIETITIAN INITIAL EVALUATION ADULT. - NS AS NUTRI INTERV ED CONTENT
Discussed diverticulosis and potential diet advancement.  Encouraged patient to eat at least 2 meals daily at home to optimize healing as well as muscular health./Nutrition relationship to health/disease

## 2019-01-31 NOTE — DIETITIAN INITIAL EVALUATION ADULT. - OTHER INFO
Patient referred for nutrition consult.  Patient found resting in bed, not very receptive or engaging with RDN.  Patient reports that she had broth this morning.  Nothing else on tray touched.  Patient encouraged to try promote 1.0 for additional calories and protein.  PTA, patient ate only one meal per day as she doesn't get hungry.  Denies any weight loss and has always been thin. No hx of diverticulosis and this RDN remains available for follow up briefly discussed condition and foods that may be best tolerated.   Patient somewhat receptive.   Was taking a probiotic from [employer] and advised to speak with MD about resuming.  No diarrhea this A.M.

## 2019-01-31 NOTE — DIETITIAN INITIAL EVALUATION ADULT. - PERTINENT MEDS FT
MEDICATIONS  (STANDING):  anastrozole 1 milliGRAM(s) Oral daily  ciprofloxacin   IVPB 200 milliGRAM(s) IV Intermittent every 12 hours  famotidine    Tablet 20 milliGRAM(s) Oral daily  heparin  Injectable 5000 Unit(s) SubCutaneous every 12 hours  metroNIDAZOLE  IVPB 500 milliGRAM(s) IV Intermittent every 8 hours  nicotine -  14 mG/24Hr(s) Patch 1 patch Transdermal daily  sertraline 200 milliGRAM(s) Oral daily  sodium chloride 0.9%. 1000 milliLiter(s) (100 mL/Hr) IV Continuous <Continuous>    MEDICATIONS  (PRN):  acetaminophen   Tablet .. 650 milliGRAM(s) Oral every 6 hours PRN Mild Pain (1 - 3)  morphine  - Injectable 2 milliGRAM(s) IV Push every 4 hours PRN Moderate Pain (4 - 6)

## 2019-01-31 NOTE — DIETITIAN INITIAL EVALUATION ADULT. - PROBLEM SELECTOR PLAN 3
See above.   Would follow up UA, urine culture--if nondiagnostic would consider prior urologic workup.

## 2019-01-31 NOTE — CONSULT NOTE ADULT - SUBJECTIVE AND OBJECTIVE BOX
Patient is a 59y old  Female who presents with a chief complaint of 4 days of colicky lower quadrants abdominal pain, loose BM, chills. (2019 08:53)      HPI:  NIGHT HOSPITALIST:  59F-patient with a history of breast CA with presents to [facility] ER following 4 days of colicky b/L lower quadrant abdominal pain with loose mucous BM initially and chills.   NO el red blood per rectum or melena.   NO chest pain/pressure.   No dyspnoea.   NO cough.  No back pain, no tearing back pain.   No dysuria, no haematuria.   Patient with anorexia for the past few days but denies weight loss.   Patient apparently following an outpatient ENT with planned outpatient nasal endoscopy for persistent presumed sinusitis since 2018.   Patient reports that she has been on intermittent antibiotics since December with an attenuated course of oral antibiotics from a local urgent care center.  Remaining review of systems not contributory.     Last colonoscopy Dr Robni less than 3 years ago benign per patient.      PAST MEDICAL & SURGICAL HISTORY:  Anxiety  Hypercholesterolemia  H/O partial mastectomy Right  C Section: 88,91,96  S/P Breast Augmentation:       MEDICATIONS  (STANDING):  anastrozole 1 milliGRAM(s) Oral daily  ciprofloxacin   IVPB 200 milliGRAM(s) IV Intermittent every 12 hours  famotidine    Tablet 20 milliGRAM(s) Oral daily  heparin  Injectable 5000 Unit(s) SubCutaneous every 12 hours  metroNIDAZOLE  IVPB 500 milliGRAM(s) IV Intermittent every 8 hours  nicotine -  14 mG/24Hr(s) Patch 1 patch Transdermal daily  sertraline 200 milliGRAM(s) Oral daily  sodium chloride 0.9%. 1000 milliLiter(s) (100 mL/Hr) IV Continuous <Continuous>    MEDICATIONS  (PRN):  acetaminophen   Tablet .. 650 milliGRAM(s) Oral every 6 hours PRN Mild Pain (1 - 3)  morphine  - Injectable 2 milliGRAM(s) IV Push every 4 hours PRN Moderate Pain (4 - 6)      Allergies    latex (Unknown)  No Known Drug Allergies    Intolerances        Review of Systems:    General:  No wt loss, fevers, chills, night sweats,fatigue,   CV:  No pain, palpitations, hypo/hypertension  Resp:  No dyspnea, cough, tachypnea, wheezing  GI:  see HPI  :  No pain, bleeding, incontinence, nocturia  Muscle:  No pain, weakness  Neuro:  No weakness, tingling, memory problems  Psych:  No fatigue, insomnia, mood problems, depression  Endocrine:  No polyuria, polydypsia, cold/heat intolerance  Heme:  No petechiae, ecchymosis, easy bruisability  Skin:  No rash, tattoos, scars, edema      Vital Signs Last 24 Hrs  T(C): 36.6 (2019 13:51), Max: 36.9 (2019 20:53)  T(F): 97.9 (2019 13:51), Max: 98.4 (2019 20:53)  HR: 76 (2019 13:51) (67 - 76)  BP: 148/83 (2019 13:51) (125/76 - 148/83)  BP(mean): --  RR: 18 (2019 13:51) (16 - 18)  SpO2: 94% (2019 13:51) (94% - 99%)    PHYSICAL EXAM:    Constitutional: NAD, well-developed  Neck: No LAD, supple  Respiratory: CTA and P  Cardiovascular: S1 and S2, RRR, no M  Gastrointestinal: BS+, soft, ND, neg HSM, lower abdominal tenderness  Extremities: No peripheral edema, neg clubbing, cyanosis  Vascular: 2+ peripheral pulses  Neurological: A/O x 3, no focal deficits  Psychiatric: Normal mood, normal affect  Skin: No rashes      LABS:                        11.5   6.9   )-----------( 209      ( 2019 07:41 )             34.2                         13.0   8.7   )-----------( 239      ( 2019 09:02 )             37.7         141  |  107  |  7   ----------------------------<  94  3.9   |  24  |  0.93    Ca    8.2<L>      2019 07:41    TPro  7.4  /  Alb  4.4  /  TBili  0.2  /  DBili  x   /  AST  20  /  ALT  12  /  AlkPhos  74  01-30      Urinalysis Basic - ( 2019 15:07 )    Color: Light Yellow / Appearance: Clear / S.044 / pH: x  Gluc: x / Ketone: Negative  / Bili: Negative / Urobili: Negative   Blood: x / Protein: Trace / Nitrite: Negative   Leuk Esterase: Negative / RBC: 0 /hpf / WBC 0 /HPF   Sq Epi: x / Non Sq Epi: 1 /hpf / Bacteria: Negative      LIVER FUNCTIONS - ( 2019 09:02 )  Alb: 4.4 g/dL / Pro: 7.4 g/dL / ALK PHOS: 74 U/L / ALT: 12 U/L / AST: 20 U/L / GGT: x           Lipase, Serum: 38 U/L (19 @ 09:02)        RADIOLOGY & ADDITIONAL TESTS:  < from: CT Abdomen and Pelvis w/ Oral Cont and w/ IV Cont (19 @ 10:44) >  BILE DUCTS: Normal caliber.  GALLBLADDER: Dependent high attenuation may be secondary to sludge and/or   small stones. Appearance of the fundus of the gallbladder suggestive of   adenomyomatosis, unchanged (2:45).  SPLEEN: Within normal limits.  PANCREAS: Within normal limits.  ADRENALS: Left renal cyst.  KIDNEYS/URETERS: No hydronephrosis. A subcentimeter hypodense focusin   the left kidney is too small to characterize, but unchanged.    BLADDER: Mild circumferential bladder wall thickening.  REPRODUCTIVE ORGANS: Uterus and adnexa are within normal limits.    BOWEL: No bowel obstruction. Circumferential thickening of the sigmoid   colon with mild associated inflammatory change and diverticuli. No   drainable fluid collection or free intraperitoneal air.  Appendix is   normal.    < end of copied text >

## 2019-01-31 NOTE — CONSULT NOTE ADULT - ASSESSMENT
Impression  59F admitted with lower abdominal pain preceded by a single episode of mucoid stool and CT imaging c/w uncomplicated sigmoid diverticulitis    Rec  Clear liquid diet. Advance to low residue diet as tolerated.  Abx as ordered.  Interval colonoscopy in 6-8 weeks to assess for healing and rule out alternative diagnosis including infectious/inflammatory colitis, segmental colitis, underlying lesion.    [phone]

## 2019-01-31 NOTE — PROGRESS NOTE ADULT - SUBJECTIVE AND OBJECTIVE BOX
Patient is a 59y old  Female who presents with a chief complaint of 4 days of colicky lower quadrants abdominal pain, loose BM, chills. (2019 15:43)      SUBJECTIVE / OVERNIGHT EVENTS:  still having lower abdominal pain.  better.  no n/v/d.  tolerating clears.  The  at bedside.      Vital Signs Last 24 Hrs  T(C): 36.6 (2019 05:52), Max: 36.9 (2019 20:53)  T(F): 97.9 (2019 05:52), Max: 98.4 (2019 20:53)  HR: 71 (2019 05:52) (67 - 86)  BP: 145/82 (2019 05:52) (122/76 - 145/82)  BP(mean): --  RR: 18 (2019 05:52) (16 - 18)  SpO2: 96% (2019 05:52) (95% - 99%)  I&O's Summary    2019 07:01  -  2019 07:00  --------------------------------------------------------  IN: 1900 mL / OUT: 0 mL / NET: 1900 mL        PHYSICAL EXAM:  GENERAL: NAD, Comfortable  HEAD:  Atraumatic, Normocephalic  EYES: EOMI, PERRLA, conjunctiva and sclera clear  NECK: Supple, No JVD  CHEST/LUNG: Clear to auscultation bilaterally; No wheeze  HEART: Regular rate and rhythm; No murmurs, rubs, or gallops  ABDOMEN: Soft, Lower abdominal diffuse tenderness, Nondistended; Bowel sounds present  Neuro: AAO x 3, no focal deficit, 5/5 b/l extremities  EXTREMITIES:  2+ Peripheral Pulses, No clubbing, cyanosis, or edema  SKIN: No rashes or lesions      LABS:                        11.5   6.9   )-----------( 209      ( 2019 07:41 )             34.2         141  |  107  |  7   ----------------------------<  94  3.9   |  24  |  0.93    Ca    8.2<L>      2019 07:41    TPro  7.4  /  Alb  4.4  /  TBili  0.2  /  DBili  x   /  AST  20  /  ALT  12  /  AlkPhos  74  01-30      CAPILLARY BLOOD GLUCOSE            Urinalysis Basic - ( 2019 15:07 )    Color: Light Yellow / Appearance: Clear / S.044 / pH: x  Gluc: x / Ketone: Negative  / Bili: Negative / Urobili: Negative   Blood: x / Protein: Trace / Nitrite: Negative   Leuk Esterase: Negative / RBC: 0 /hpf / WBC 0 /HPF   Sq Epi: x / Non Sq Epi: 1 /hpf / Bacteria: Negative        RADIOLOGY & ADDITIONAL TESTS:    Imaging Personally Reviewed:  [x] YES  [ ] NO    Consultant(s) Notes Reviewed:  [x] YES  [ ] NO      MEDICATIONS  (STANDING):  anastrozole 1 milliGRAM(s) Oral daily  ciprofloxacin   IVPB 200 milliGRAM(s) IV Intermittent every 12 hours  famotidine    Tablet 20 milliGRAM(s) Oral daily  heparin  Injectable 5000 Unit(s) SubCutaneous every 12 hours  metroNIDAZOLE  IVPB 500 milliGRAM(s) IV Intermittent every 8 hours  nicotine -  14 mG/24Hr(s) Patch 1 patch Transdermal daily  sertraline 200 milliGRAM(s) Oral daily  sodium chloride 0.9%. 1000 milliLiter(s) (100 mL/Hr) IV Continuous <Continuous>    MEDICATIONS  (PRN):  acetaminophen   Tablet .. 650 milliGRAM(s) Oral every 6 hours PRN Mild Pain (1 - 3)  morphine  - Injectable 2 milliGRAM(s) IV Push every 4 hours PRN Moderate Pain (4 - 6)      Care Discussed with Consultants/Other Providers [x] YES  [ ] NO    HEALTH ISSUES - PROBLEM Dx:  History of sinusitis: History of sinusitis  Bladder disorder: Bladder disorder  Tobacco dependence: Tobacco dependence  Diverticulitis: Diverticulitis

## 2019-01-31 NOTE — PROGRESS NOTE ADULT - PROBLEM SELECTOR PLAN 3
asymptomatic mild wall thickening of the urinary bladder.  f/u UA, urine culture  outpt urology f/u.

## 2019-01-31 NOTE — PROGRESS NOTE ADULT - PROBLEM SELECTOR PLAN 1
CT abdomen with sigmoid diverticulitis   patient also with an outpatient evaluation for an apparent protracted course for presumed sinusitis with outpatient courses of oral antibiotics>>doubt C. difficile but will obtain stool assay   pain control on IV morphine  c/w IV Cipro and IV Flagyl.    Blood cultures ordered.   GI PCR, stool studies, c.diff pending.  GI consult. (pt's GI Navi Jose M doesn't come to [facility] per his office [phone].

## 2019-01-31 NOTE — DIETITIAN INITIAL EVALUATION ADULT. - PERTINENT LABORATORY DATA
Na 141, K+ 3.9, BUN 7, Cr 0.93, BG 94, Phos --, Alk Phos --, AST --, ALT --, Mg --, Ca 8.2, HbA1c --

## 2019-01-31 NOTE — PROGRESS NOTE ADULT - ASSESSMENT
60 y/o F with a history of breast CA with RIGHT MRM presumed to be in remission, breast implants, moderate tobacco use since age 15, depression, with patient self referring to the [place] ER following 4 days of colicky b/L lower quadrant abdominal pain with loose mucous BM, chills.

## 2019-02-01 LAB
ANION GAP SERPL CALC-SCNC: 11 MMOL/L — SIGNIFICANT CHANGE UP (ref 5–17)
BUN SERPL-MCNC: 5 MG/DL — LOW (ref 7–23)
CALCIUM SERPL-MCNC: 8.5 MG/DL — SIGNIFICANT CHANGE UP (ref 8.4–10.5)
CHLORIDE SERPL-SCNC: 104 MMOL/L — SIGNIFICANT CHANGE UP (ref 96–108)
CO2 SERPL-SCNC: 21 MMOL/L — LOW (ref 22–31)
CREAT SERPL-MCNC: 0.8 MG/DL — SIGNIFICANT CHANGE UP (ref 0.5–1.3)
GLUCOSE SERPL-MCNC: 104 MG/DL — HIGH (ref 70–99)
HCT VFR BLD CALC: 39.3 % — SIGNIFICANT CHANGE UP (ref 34.5–45)
HGB BLD-MCNC: 13.7 G/DL — SIGNIFICANT CHANGE UP (ref 11.5–15.5)
MCHC RBC-ENTMCNC: 32.6 PG — SIGNIFICANT CHANGE UP (ref 27–34)
MCHC RBC-ENTMCNC: 34.8 GM/DL — SIGNIFICANT CHANGE UP (ref 32–36)
MCV RBC AUTO: 93.6 FL — SIGNIFICANT CHANGE UP (ref 80–100)
PLATELET # BLD AUTO: 228 K/UL — SIGNIFICANT CHANGE UP (ref 150–400)
POTASSIUM SERPL-MCNC: 3.8 MMOL/L — SIGNIFICANT CHANGE UP (ref 3.5–5.3)
POTASSIUM SERPL-SCNC: 3.8 MMOL/L — SIGNIFICANT CHANGE UP (ref 3.5–5.3)
RBC # BLD: 4.2 M/UL — SIGNIFICANT CHANGE UP (ref 3.8–5.2)
RBC # FLD: 11.8 % — SIGNIFICANT CHANGE UP (ref 10.3–14.5)
SODIUM SERPL-SCNC: 136 MMOL/L — SIGNIFICANT CHANGE UP (ref 135–145)
WBC # BLD: 7.1 K/UL — SIGNIFICANT CHANGE UP (ref 3.8–10.5)
WBC # FLD AUTO: 7.1 K/UL — SIGNIFICANT CHANGE UP (ref 3.8–10.5)

## 2019-02-01 PROCEDURE — 93306 TTE W/DOPPLER COMPLETE: CPT | Mod: 26

## 2019-02-01 RX ORDER — ACETAMINOPHEN 500 MG
750 TABLET ORAL ONCE
Qty: 0 | Refills: 0 | Status: COMPLETED | OUTPATIENT
Start: 2019-02-01 | End: 2019-02-01

## 2019-02-01 RX ORDER — ALPRAZOLAM 0.25 MG
0.5 TABLET ORAL DAILY
Qty: 0 | Refills: 0 | Status: DISCONTINUED | OUTPATIENT
Start: 2019-02-01 | End: 2019-02-03

## 2019-02-01 RX ORDER — ONDANSETRON 8 MG/1
4 TABLET, FILM COATED ORAL ONCE
Qty: 0 | Refills: 0 | Status: COMPLETED | OUTPATIENT
Start: 2019-02-01 | End: 2019-02-01

## 2019-02-01 RX ADMIN — Medication 300 MILLIGRAM(S): at 17:22

## 2019-02-01 RX ADMIN — FAMOTIDINE 20 MILLIGRAM(S): 10 INJECTION INTRAVENOUS at 13:19

## 2019-02-01 RX ADMIN — Medication 300 MILLIGRAM(S): at 11:03

## 2019-02-01 RX ADMIN — HEPARIN SODIUM 5000 UNIT(S): 5000 INJECTION INTRAVENOUS; SUBCUTANEOUS at 22:12

## 2019-02-01 RX ADMIN — Medication 750 MILLIGRAM(S): at 11:50

## 2019-02-01 RX ADMIN — Medication 1 PATCH: at 13:19

## 2019-02-01 RX ADMIN — SERTRALINE 200 MILLIGRAM(S): 25 TABLET, FILM COATED ORAL at 13:18

## 2019-02-01 RX ADMIN — Medication 30 MILLILITER(S): at 16:25

## 2019-02-01 RX ADMIN — ONDANSETRON 4 MILLIGRAM(S): 8 TABLET, FILM COATED ORAL at 02:22

## 2019-02-01 RX ADMIN — Medication 100 MILLIGRAM(S): at 21:00

## 2019-02-01 RX ADMIN — Medication 1 PATCH: at 13:25

## 2019-02-01 RX ADMIN — Medication 300 MILLIGRAM(S): at 02:53

## 2019-02-01 RX ADMIN — Medication 100 MILLIGRAM(S): at 11:08

## 2019-02-01 RX ADMIN — Medication 100 MILLIGRAM(S): at 06:23

## 2019-02-01 RX ADMIN — HEPARIN SODIUM 5000 UNIT(S): 5000 INJECTION INTRAVENOUS; SUBCUTANEOUS at 11:10

## 2019-02-01 RX ADMIN — Medication 750 MILLIGRAM(S): at 03:08

## 2019-02-01 RX ADMIN — Medication 100 MILLIGRAM(S): at 13:19

## 2019-02-01 RX ADMIN — ANASTROZOLE 1 MILLIGRAM(S): 1 TABLET ORAL at 13:19

## 2019-02-01 RX ADMIN — Medication 0.5 MILLIGRAM(S): at 22:12

## 2019-02-01 RX ADMIN — SODIUM CHLORIDE 100 MILLILITER(S): 9 INJECTION INTRAMUSCULAR; INTRAVENOUS; SUBCUTANEOUS at 17:22

## 2019-02-01 RX ADMIN — Medication 100 MILLIGRAM(S): at 22:13

## 2019-02-01 RX ADMIN — Medication 1 PATCH: at 17:34

## 2019-02-01 RX ADMIN — Medication 750 MILLIGRAM(S): at 18:10

## 2019-02-01 RX ADMIN — Medication 30 MILLILITER(S): at 20:39

## 2019-02-01 NOTE — PROGRESS NOTE ADULT - ASSESSMENT
58 y/o F with a history of breast CA with RIGHT MRM presumed to be in remission, breast implants, moderate tobacco use since age 15, depression, with patient self referring to the [place] ER following 4 days of colicky b/L lower quadrant abdominal pain with loose mucous BM, chills.

## 2019-02-01 NOTE — PROGRESS NOTE ADULT - PROBLEM SELECTOR PLAN 3
asymptomatic mild wall thickening of the urinary bladder.  neg UA, urine culture  outpt urology f/u.  ?cystoscopy if persists

## 2019-02-01 NOTE — PROGRESS NOTE ADULT - PROBLEM SELECTOR PLAN 1
CT abdomen with sigmoid diverticulitis   patient also with an outpatient evaluation for an apparent protracted course for presumed sinusitis with outpatient courses of oral antibiotics>>doubt C. difficile but will obtain stool assay   pain control on IV morphine and PRN tylenol.   c/w IV Cipro and IV Flagyl.    Blood cultures ordered.   GI PCR, stool studies, c.diff neg  GI consult appreciated. (pt's GI Navi Salguero doesn't come to [facility] per his office [phone].  advance to low fiber diet today.

## 2019-02-01 NOTE — PROGRESS NOTE ADULT - SUBJECTIVE AND OBJECTIVE BOX
Pain improving.  No diarrhea.  Tolerating liquids.    Last colonoscopy Dr Salguero less than 3 years ago benign per patient.      PAST MEDICAL & SURGICAL HISTORY:  Anxiety  Hypercholesterolemia  H/O partial mastectomy Right  C Section: 88,91,96  S/P Breast Augmentation:       MEDICATIONS  (STANDING):  anastrozole 1 milliGRAM(s) Oral daily  ciprofloxacin   IVPB 200 milliGRAM(s) IV Intermittent every 12 hours  famotidine    Tablet 20 milliGRAM(s) Oral daily  heparin  Injectable 5000 Unit(s) SubCutaneous every 12 hours  metroNIDAZOLE  IVPB 500 milliGRAM(s) IV Intermittent every 8 hours  nicotine -  14 mG/24Hr(s) Patch 1 patch Transdermal daily  sertraline 200 milliGRAM(s) Oral daily  sodium chloride 0.9%. 1000 milliLiter(s) (100 mL/Hr) IV Continuous <Continuous>    MEDICATIONS  (PRN):  acetaminophen   Tablet .. 650 milliGRAM(s) Oral every 6 hours PRN Mild Pain (1 - 3)  morphine  - Injectable 2 milliGRAM(s) IV Push every 4 hours PRN Moderate Pain (4 - 6)      Allergies    latex (Unknown)  No Known Drug Allergies    Intolerances        Review of Systems:    General:  No wt loss, fevers, chills, night sweats,fatigue,   CV:  No pain, palpitations, hypo/hypertension  Resp:  No dyspnea, cough, tachypnea, wheezing  GI:  see HPI  :  No pain, bleeding, incontinence, nocturia  Muscle:  No pain, weakness  Neuro:  No weakness, tingling, memory problems  Psych:  No fatigue, insomnia, mood problems, depression  Endocrine:  No polyuria, polydypsia, cold/heat intolerance  Heme:  No petechiae, ecchymosis, easy bruisability  Skin:  No rash, tattoos, scars, edema      Vital Signs Last 24 Hrs  T(C): 36.8 (2019 14:22), Max: 37 (2019 21:39)  T(F): 98.3 (2019 14:22), Max: 98.6 (2019 21:39)  HR: 71 (2019 14:22) (68 - 83)  BP: 131/84 (2019 14:22) (131/75 - 148/87)  BP(mean): --  RR: 18 (2019 14:22) (18 - 18)  SpO2: 96% (2019 14:22) (96% - 100%)    PHYSICAL EXAM:    Constitutional: NAD, well-developed  Neck: No LAD, supple  Respiratory: CTA and P  Cardiovascular: S1 and S2, RRR, no M  Gastrointestinal: BS+, soft, ND, neg HSM, mild lower abdominal tenderness  Extremities: No peripheral edema, neg clubbing, cyanosis  Vascular: 2+ peripheral pulses  Neurological: A/O x 3, no focal deficits  Psychiatric: Normal mood, normal affect  Skin: No rashes        LABS:                        13.7   7.1   )-----------( 228      ( 2019 07:19 )             39.3     02-    136  |  104  |  5<L>  ----------------------------<  104<H>  3.8   |  21<L>  |  0.80    Ca    8.5      2019 07:19          Urinalysis Basic - ( 2019 15:07 )    Color: Light Yellow / Appearance: Clear / S.044 / pH: x  Gluc: x / Ketone: Negative  / Bili: Negative / Urobili: Negative   Blood: x / Protein: Trace / Nitrite: Negative   Leuk Esterase: Negative / RBC: 0 /hpf / WBC 0 /HPF   Sq Epi: x / Non Sq Epi: 1 /hpf / Bacteria: Negative                    RADIOLOGY & ADDITIONAL TESTS:  < from: CT Abdomen and Pelvis w/ Oral Cont and w/ IV Cont (19 @ 10:44) >  BILE DUCTS: Normal caliber.  GALLBLADDER: Dependent high attenuation may be secondary to sludge and/or   small stones. Appearance of the fundus of the gallbladder suggestive of   adenomyomatosis, unchanged (2:45).  SPLEEN: Within normal limits.  PANCREAS: Within normal limits.  ADRENALS: Left renal cyst.  KIDNEYS/URETERS: No hydronephrosis. A subcentimeter hypodense focusin   the left kidney is too small to characterize, but unchanged.    BLADDER: Mild circumferential bladder wall thickening.  REPRODUCTIVE ORGANS: Uterus and adnexa are within normal limits.    BOWEL: No bowel obstruction. Circumferential thickening of the sigmoid   colon with mild associated inflammatory change and diverticuli. No   drainable fluid collection or free intraperitoneal air.  Appendix is   normal.    < end of copied text >

## 2019-02-01 NOTE — PROGRESS NOTE ADULT - ASSESSMENT
Impression  59F admitted with lower abdominal pain preceded by a single episode of mucoid stool and CT imaging c/w uncomplicated sigmoid diverticulitis    Rec  Advance to low residue diet as tolerated.  Abx as ordered.  Interval colonoscopy in 6-8 weeks with Dr Salguero to assess for healing and rule out alternative diagnosis including infectious/inflammatory colitis, segmental colitis, underlying lesion.  Can transition to PO cipro/flagyl total 10-14 and discharge when tolerating diet.  [phone]

## 2019-02-01 NOTE — PROGRESS NOTE ADULT - SUBJECTIVE AND OBJECTIVE BOX
Patient is a 59y old  Female who presents with a chief complaint of 4 days of colicky lower quadrants abdominal pain, loose BM, chills. (01 Feb 2019 14:30)      SUBJECTIVE / OVERNIGHT EVENTS:  No overnight events.  Pt feels a little better. still have abd pain.  only wants tylenol.   Denied cp, sob, n/v/d.  No HA/dizziness.   tolerating clears.  advanced to low fiber diet.      Vital Signs Last 24 Hrs  T(C): 36.5 (01 Feb 2019 16:55), Max: 37 (31 Jan 2019 21:39)  T(F): 97.7 (01 Feb 2019 16:55), Max: 98.6 (31 Jan 2019 21:39)  HR: 68 (01 Feb 2019 16:55) (68 - 74)  BP: 146/88 (01 Feb 2019 16:55) (131/75 - 148/87)  BP(mean): --  RR: 18 (01 Feb 2019 16:55) (18 - 18)  SpO2: 97% (01 Feb 2019 16:55) (96% - 99%)  I&O's Summary    31 Jan 2019 07:01  -  01 Feb 2019 07:00  --------------------------------------------------------  IN: 3495 mL / OUT: 0 mL / NET: 3495 mL    01 Feb 2019 07:01  -  01 Feb 2019 18:10  --------------------------------------------------------  IN: 450 mL / OUT: 0 mL / NET: 450 mL      PHYSICAL EXAM:  GENERAL: NAD, Comfortable  HEAD:  Atraumatic, Normocephalic  EYES: EOMI, PERRLA, conjunctiva and sclera clear  NECK: Supple, No JVD  CHEST/LUNG: Clear to auscultation bilaterally; No wheeze  HEART: Regular rate and rhythm; No murmurs, rubs, or gallops  ABDOMEN: Soft, Lower abdominal diffuse tenderness, Nondistended; Bowel sounds present  Neuro: AAO x 3, no focal deficit, 5/5 b/l extremities  EXTREMITIES:  2+ Peripheral Pulses, No clubbing, cyanosis, or edema  SKIN: No rashes or lesions    LABS:                        13.7   7.1   )-----------( 228      ( 01 Feb 2019 07:19 )             39.3     02-01    136  |  104  |  5<L>  ----------------------------<  104<H>  3.8   |  21<L>  |  0.80    Ca    8.5      01 Feb 2019 07:19        CAPILLARY BLOOD GLUCOSE                RADIOLOGY & ADDITIONAL TESTS:    Imaging Personally Reviewed:  [x] YES  [ ] NO    Consultant(s) Notes Reviewed:  [x] YES  [ ] NO      MEDICATIONS  (STANDING):  anastrozole 1 milliGRAM(s) Oral daily  ciprofloxacin   IVPB 200 milliGRAM(s) IV Intermittent every 12 hours  famotidine    Tablet 20 milliGRAM(s) Oral daily  heparin  Injectable 5000 Unit(s) SubCutaneous every 12 hours  metroNIDAZOLE  IVPB 500 milliGRAM(s) IV Intermittent every 8 hours  nicotine -  14 mG/24Hr(s) Patch 1 patch Transdermal daily  sertraline 200 milliGRAM(s) Oral daily  sodium chloride 0.9%. 1000 milliLiter(s) (100 mL/Hr) IV Continuous <Continuous>    MEDICATIONS  (PRN):  acetaminophen   Tablet .. 650 milliGRAM(s) Oral every 6 hours PRN Mild Pain (1 - 3)  aluminum hydroxide/magnesium hydroxide/simethicone Suspension 30 milliLiter(s) Oral every 4 hours PRN Dyspepsia  morphine  - Injectable 2 milliGRAM(s) IV Push every 4 hours PRN Moderate Pain (4 - 6)      Care Discussed with Consultants/Other Providers [x] YES  [ ] NO    HEALTH ISSUES - PROBLEM Dx:  History of sinusitis: History of sinusitis  Bladder disorder: Bladder disorder  Tobacco dependence: Tobacco dependence  Diverticulitis: Diverticulitis

## 2019-02-02 ENCOUNTER — TRANSCRIPTION ENCOUNTER (OUTPATIENT)
Age: 59
End: 2019-02-02

## 2019-02-02 LAB
ANION GAP SERPL CALC-SCNC: 11 MMOL/L — SIGNIFICANT CHANGE UP (ref 5–17)
BUN SERPL-MCNC: 5 MG/DL — LOW (ref 7–23)
CALCIUM SERPL-MCNC: 8.4 MG/DL — SIGNIFICANT CHANGE UP (ref 8.4–10.5)
CHLORIDE SERPL-SCNC: 106 MMOL/L — SIGNIFICANT CHANGE UP (ref 96–108)
CO2 SERPL-SCNC: 22 MMOL/L — SIGNIFICANT CHANGE UP (ref 22–31)
CREAT SERPL-MCNC: 0.76 MG/DL — SIGNIFICANT CHANGE UP (ref 0.5–1.3)
GLUCOSE SERPL-MCNC: 139 MG/DL — HIGH (ref 70–99)
POTASSIUM SERPL-MCNC: 3.6 MMOL/L — SIGNIFICANT CHANGE UP (ref 3.5–5.3)
POTASSIUM SERPL-SCNC: 3.6 MMOL/L — SIGNIFICANT CHANGE UP (ref 3.5–5.3)
SODIUM SERPL-SCNC: 139 MMOL/L — SIGNIFICANT CHANGE UP (ref 135–145)

## 2019-02-02 RX ORDER — PANTOPRAZOLE SODIUM 20 MG/1
40 TABLET, DELAYED RELEASE ORAL
Qty: 0 | Refills: 0 | Status: DISCONTINUED | OUTPATIENT
Start: 2019-02-02 | End: 2019-02-03

## 2019-02-02 RX ADMIN — Medication 100 MILLIGRAM(S): at 21:17

## 2019-02-02 RX ADMIN — Medication 650 MILLIGRAM(S): at 07:56

## 2019-02-02 RX ADMIN — ANASTROZOLE 1 MILLIGRAM(S): 1 TABLET ORAL at 11:44

## 2019-02-02 RX ADMIN — Medication 100 MILLIGRAM(S): at 14:00

## 2019-02-02 RX ADMIN — Medication 100 MILLIGRAM(S): at 22:18

## 2019-02-02 RX ADMIN — Medication 650 MILLIGRAM(S): at 20:15

## 2019-02-02 RX ADMIN — Medication 650 MILLIGRAM(S): at 08:30

## 2019-02-02 RX ADMIN — PANTOPRAZOLE SODIUM 40 MILLIGRAM(S): 20 TABLET, DELAYED RELEASE ORAL at 11:43

## 2019-02-02 RX ADMIN — Medication 1 PATCH: at 11:44

## 2019-02-02 RX ADMIN — Medication 30 MILLILITER(S): at 05:24

## 2019-02-02 RX ADMIN — Medication 650 MILLIGRAM(S): at 19:45

## 2019-02-02 RX ADMIN — SERTRALINE 200 MILLIGRAM(S): 25 TABLET, FILM COATED ORAL at 11:44

## 2019-02-02 RX ADMIN — HEPARIN SODIUM 5000 UNIT(S): 5000 INJECTION INTRAVENOUS; SUBCUTANEOUS at 10:29

## 2019-02-02 RX ADMIN — Medication 0.5 MILLIGRAM(S): at 22:18

## 2019-02-02 RX ADMIN — PANTOPRAZOLE SODIUM 40 MILLIGRAM(S): 20 TABLET, DELAYED RELEASE ORAL at 18:35

## 2019-02-02 RX ADMIN — Medication 100 MILLIGRAM(S): at 05:24

## 2019-02-02 RX ADMIN — HEPARIN SODIUM 5000 UNIT(S): 5000 INJECTION INTRAVENOUS; SUBCUTANEOUS at 21:16

## 2019-02-02 RX ADMIN — Medication 1 PATCH: at 05:25

## 2019-02-02 RX ADMIN — Medication 30 MILLILITER(S): at 19:45

## 2019-02-02 RX ADMIN — Medication 1 PATCH: at 11:46

## 2019-02-02 RX ADMIN — Medication 30 MILLILITER(S): at 09:04

## 2019-02-02 RX ADMIN — Medication 100 MILLIGRAM(S): at 10:28

## 2019-02-02 RX ADMIN — FAMOTIDINE 20 MILLIGRAM(S): 10 INJECTION INTRAVENOUS at 11:44

## 2019-02-02 NOTE — DISCHARGE NOTE ADULT - PLAN OF CARE
resolution of symptoms Low residue / dairy free diet  Prilosec 20 daily  Continue Pepcid  Continue antibiotics as prescribed  F/U with Dr Salguero in 1-2 weeks  Interval colonoscopy in 6-8 weeks with Dr. Salguero to assess for healing and rule out alternative diagnosis including infectious/inflammatory colitis, segmental colitis, underlying lesion. Smoking cessation  Continue nicotine patch Continue current medication regimen

## 2019-02-02 NOTE — PROGRESS NOTE ADULT - PROBLEM SELECTOR PLAN 1
CT abdomen with sigmoid diverticulitis   patient also with an outpatient evaluation for an apparent protracted course for presumed sinusitis with outpatient courses of oral antibiotics>>doubt C. difficile but will obtain stool assay   pain control on IV morphine and PRN tylenol.   c/w IV Cipro and IV Flagyl.    Blood cultures ordered.   GI PCR, stool studies, c.diff neg  GI consult appreciated. (pt's GI Navi Jose M doesn't come to [facility] per his office [phone].  advance to low fiber diet   add PPI BID for GERD symptoms.  d/c IVF.

## 2019-02-02 NOTE — PROGRESS NOTE ADULT - ASSESSMENT
Impression  59F admitted with lower abdominal pain preceded by a single episode of mucoid stool and CT imaging c/w uncomplicated sigmoid diverticulitis vs colitis      Rec  Advance to low residue diet as tolerated. Will change to dairy free.  Continue IV antibiotics  Will add Protonix 40 BID  Interval colonoscopy in 6-8 weeks with Dr Salguero to assess for healing and rule out alternative diagnosis including infectious/inflammatory colitis, segmental colitis, underlying lesion.    [phone]

## 2019-02-02 NOTE — DISCHARGE NOTE ADULT - MEDICATION SUMMARY - MEDICATIONS TO TAKE
I will START or STAY ON the medications listed below when I get home from the hospital:    metroNIDAZOLE 500 mg oral tablet  -- 1 tab(s) by mouth every 8 hours  -- Indication: For Diverticulitis    acetaminophen 325 mg oral tablet  -- 2 tab(s) by mouth every 6 hours, As needed, Mild Pain (1 - 3)  -- Indication: For pain    aluminum hydroxide-magnesium hydroxide 200 mg-200 mg/5 mL oral suspension  -- 30 milliliter(s) by mouth every 4 hours, As needed, Dyspepsia  -- Indication: For antacid    Zoloft 100 mg oral tablet  -- 2 tab(s) by mouth once a day  -- Indication: For antidepressant    Arimidex 1 mg oral tablet  -- 1 tab(s) by mouth once a day   -- Indication: For antineoplastic    Xanax 0.5 mg oral tablet  -- 1 tab(s) by mouth once a day    NOTE: Pharmacy RX says to take 3 times daily as needed.  -- Indication: For anxiety    Reclast 5 mg/100 mL intravenous solution  --  intravenous yearly    NOTE: Gets injection from MD office yearly. NOT RX  -- Indication: For metabolic agent    famotidine 20 mg oral tablet  -- 1 tab(s) by mouth once a day  -- Indication: For gastrointestinal agent     Probiotic Formula oral capsule  -- 1 cap(s) by mouth once a day  -- Indication: For probiotic    PriLOSEC 40 mg oral delayed release capsule  -- 1 cap(s) by mouth once a day  -- Indication: For gastrointestinal agent     ciprofloxacin 500 mg oral tablet  -- 1 tab(s) by mouth every 12 hours  -- Indication: For Diverticulitis    nicotine 14 mg/24 hr transdermal film, extended release  -- 1 patch by transdermal patch once a day   -- Indication: For Tobacco dependence

## 2019-02-02 NOTE — PROGRESS NOTE ADULT - SUBJECTIVE AND OBJECTIVE BOX
Patient is a 59y old  Female who presents with a chief complaint of 4 days of colicky lower quadrants abdominal pain, loose BM, chills. (02 Feb 2019 11:42)      SUBJECTIVE / OVERNIGHT EVENTS:  feels better.  the lower abd pain resolves. Now complaints of GERD, upper abd discomfort.   no cp, no sob, no n/v. no headache, no dizziness.   loose stool after eating solids.       Vital Signs Last 24 Hrs  T(C): 36.8 (02 Feb 2019 18:03), Max: 36.9 (02 Feb 2019 13:36)  T(F): 98.3 (02 Feb 2019 18:03), Max: 98.4 (02 Feb 2019 13:36)  HR: 79 (02 Feb 2019 18:03) (67 - 91)  BP: 147/87 (02 Feb 2019 18:03) (119/79 - 149/87)  BP(mean): --  RR: 17 (02 Feb 2019 18:03) (17 - 18)  SpO2: 98% (02 Feb 2019 18:03) (96% - 100%)  I&O's Summary    01 Feb 2019 07:01  -  02 Feb 2019 07:00  --------------------------------------------------------  IN: 3920 mL / OUT: 300 mL / NET: 3620 mL    02 Feb 2019 07:01  -  02 Feb 2019 18:39  --------------------------------------------------------  IN: 960 mL / OUT: 0 mL / NET: 960 mL        PHYSICAL EXAM:  GENERAL: NAD, Comfortable  HEAD:  Atraumatic, Normocephalic  EYES: EOMI, PERRLA, conjunctiva and sclera clear  NECK: Supple, No JVD  CHEST/LUNG: Clear to auscultation bilaterally; No wheeze  HEART: Regular rate and rhythm; No murmurs, rubs, or gallops  ABDOMEN: Soft, Nontender, Nondistended; Bowel sounds present  Neuro: AAO x 3, no focal deficit, 5/5 b/l extremities  EXTREMITIES:  2+ Peripheral Pulses, No clubbing, cyanosis, or edema  SKIN: No rashes or lesions    LABS:                        13.7   7.1   )-----------( 228      ( 01 Feb 2019 07:19 )             39.3     02-02    139  |  106  |  5<L>  ----------------------------<  139<H>  3.6   |  22  |  0.76    Ca    8.4      02 Feb 2019 06:58        CAPILLARY BLOOD GLUCOSE                RADIOLOGY & ADDITIONAL TESTS:    Imaging Personally Reviewed:  [x] YES  [ ] NO    Consultant(s) Notes Reviewed:  [x] YES  [ ] NO      MEDICATIONS  (STANDING):  anastrozole 1 milliGRAM(s) Oral daily  ciprofloxacin   IVPB 200 milliGRAM(s) IV Intermittent every 12 hours  famotidine    Tablet 20 milliGRAM(s) Oral daily  heparin  Injectable 5000 Unit(s) SubCutaneous every 12 hours  metroNIDAZOLE  IVPB 500 milliGRAM(s) IV Intermittent every 8 hours  nicotine -  14 mG/24Hr(s) Patch 1 patch Transdermal daily  pantoprazole  Injectable 40 milliGRAM(s) IV Push two times a day  sertraline 200 milliGRAM(s) Oral daily    MEDICATIONS  (PRN):  acetaminophen   Tablet .. 650 milliGRAM(s) Oral every 6 hours PRN Mild Pain (1 - 3)  ALPRAZolam 0.5 milliGRAM(s) Oral daily PRN anxiety/sleep  aluminum hydroxide/magnesium hydroxide/simethicone Suspension 30 milliLiter(s) Oral every 4 hours PRN Dyspepsia  morphine  - Injectable 2 milliGRAM(s) IV Push every 4 hours PRN Moderate Pain (4 - 6)      Care Discussed with Consultants/Other Providers [x] YES  [ ] NO    HEALTH ISSUES - PROBLEM Dx:  History of sinusitis: History of sinusitis  Bladder disorder: Bladder disorder  Tobacco dependence: Tobacco dependence  Diverticulitis: Diverticulitis

## 2019-02-02 NOTE — DISCHARGE NOTE ADULT - CARE PLAN
Principal Discharge DX:	Diverticulitis  Goal:	resolution of symptoms  Assessment and plan of treatment:	Low residue / dairy free diet  Prilosec 20 daily  Continue Pepcid  Continue antibiotics as prescribed  F/U with Dr Salguero in 1-2 weeks  Interval colonoscopy in 6-8 weeks with Dr. Salguero to assess for healing and rule out alternative diagnosis including infectious/inflammatory colitis, segmental colitis, underlying lesion.  Secondary Diagnosis:	Tobacco dependence  Assessment and plan of treatment:	Smoking cessation  Continue nicotine patch  Secondary Diagnosis:	Anxiety  Assessment and plan of treatment:	Continue current medication regimen

## 2019-02-02 NOTE — PROGRESS NOTE ADULT - ASSESSMENT
60 y/o F with a history of breast CA with RIGHT MRM presumed to be in remission, breast implants, moderate tobacco use since age 15, depression, with patient self referring to the [place] ER following 4 days of colicky b/L lower quadrant abdominal pain with loose mucous BM, chills.

## 2019-02-02 NOTE — DISCHARGE NOTE ADULT - HOSPITAL COURSE
58 y/o F with a history of breast CA with RIGHT MRM presumed to be in remission, breast implants, moderate tobacco use since age 15, depression, with patient self referring to the [place] ER following 4 days of colicky b/L lower quadrant abdominal pain with loose mucous BM, chills.        Diverticulitis.  Plan: CT abdomen with sigmoid diverticulitis   patient also with an outpatient evaluation for an apparent protracted course for presumed sinusitis with outpatient courses of oral antibiotics>>doubt C. difficile but will obtain stool assay   pain control on IV morphine and PRN Tylenol   c/w IV Cipro and IV Flagyl.    Blood cultures ordered.   GI PCR, stool studies, c.diff neg  GI consult appreciated. (pt's GI Navi Salguero doesn't come to [facility] per his office [phone].  advance to low fiber diet   add PPI BID for GERD symptoms.  d/c IVF.     Tobacco dependence.  Plan: Patient agrees to the Nicoderm patch.    smoking cessation counseled.     Bladder disorder.  Plan: asymptomatic mild wall thickening of the urinary bladder.  neg UA, urine culture  ou-tpt urology f/u.

## 2019-02-02 NOTE — DISCHARGE NOTE ADULT - VISION (WITH CORRECTIVE LENSES IF THE PATIENT USUALLY WEARS THEM):
Last seen: 02/12/18 by Dr. Pastrana  Next appt: None     Was the patient seen in the last year in this department? Yes   Does patient have an active prescription for medications requested? No   Received Request Via: Pharmacy    
Normal vision: sees adequately in most situations; can see medication labels, newsprint

## 2019-02-02 NOTE — PROGRESS NOTE ADULT - SUBJECTIVE AND OBJECTIVE BOX
No longer with lower abdominal pain but has upper abdominal discomfort and reflux this AM  Feels bloated  No vomiting  Tolerating regular food      Vital Signs Last 24 Hrs  T(C): 36.8 (02 Feb 2019 09:00), Max: 36.8 (01 Feb 2019 14:22)  T(F): 98.2 (02 Feb 2019 09:00), Max: 98.3 (01 Feb 2019 14:22)  HR: 91 (02 Feb 2019 09:00) (67 - 91)  BP: 126/78 (02 Feb 2019 09:00) (119/79 - 149/87)  BP(mean): --  RR: 18 (02 Feb 2019 09:00) (18 - 18)  SpO2: 97% (02 Feb 2019 09:00) (96% - 97%)    PHYSICAL EXAM:    Constitutional: NAD, well-developed  Neck: No LAD, supple  Respiratory: CTA and P  Cardiovascular: S1 and S2, RRR, no M  Gastrointestinal: BS+, soft, upper abdominal discomfort and tenderness to palp, no tenderness in lower abd, no RT or guarding  Extremities: No peripheral edema, neg clubing, cyanosis  Vascular: 2+ peripheral pulses  Neurological: A/O x 3, no focal deficits  Psychiatric: Normal mood, normal affect  Skin: No rashes    MEDICATIONS  (STANDING):  anastrozole 1 milliGRAM(s) Oral daily  ciprofloxacin   IVPB 200 milliGRAM(s) IV Intermittent every 12 hours  famotidine    Tablet 20 milliGRAM(s) Oral daily  heparin  Injectable 5000 Unit(s) SubCutaneous every 12 hours  metroNIDAZOLE  IVPB 500 milliGRAM(s) IV Intermittent every 8 hours  nicotine -  14 mG/24Hr(s) Patch 1 patch Transdermal daily  sertraline 200 milliGRAM(s) Oral daily  sodium chloride 0.9%. 1000 milliLiter(s) (100 mL/Hr) IV Continuous <Continuous>    MEDICATIONS  (PRN):  acetaminophen   Tablet .. 650 milliGRAM(s) Oral every 6 hours PRN Mild Pain (1 - 3)  ALPRAZolam 0.5 milliGRAM(s) Oral daily PRN anxiety/sleep  aluminum hydroxide/magnesium hydroxide/simethicone Suspension 30 milliLiter(s) Oral every 4 hours PRN Dyspepsia  morphine  - Injectable 2 milliGRAM(s) IV Push every 4 hours PRN Moderate Pain (4 - 6)      Allergies    latex (Unknown)  No Known Drug Allergies    Intolerances          LABS:                        13.7   7.1   )-----------( 228      ( 01 Feb 2019 07:19 )             39.3                         11.5   6.9   )-----------( 209      ( 31 Jan 2019 07:41 )             34.2     02-02    139  |  106  |  5<L>  ----------------------------<  139<H>  3.6   |  22  |  0.76    Ca    8.4      02 Feb 2019 06:58                RADIOLOGY & ADDITIONAL TESTS:

## 2019-02-02 NOTE — DISCHARGE NOTE ADULT - CONDITIONS AT DISCHARGE
Pt. verbalized understanding of discharge instructions. Pt. alert and oriented x 4, ambulatory, voiding, tolerating diet, vss. Pt. verbalized understanding of medications prescribed and to follow up with MD in time ordered. IV lock removed and safety maintained.

## 2019-02-02 NOTE — DISCHARGE NOTE ADULT - PATIENT PORTAL LINK FT
You can access the [facility] Patient Portal, offered by [facility], by registering with the following website: [email or web address]

## 2019-02-03 VITALS
DIASTOLIC BLOOD PRESSURE: 79 MMHG | SYSTOLIC BLOOD PRESSURE: 128 MMHG | OXYGEN SATURATION: 97 % | HEART RATE: 80 BPM | RESPIRATION RATE: 18 BRPM | TEMPERATURE: 98 F

## 2019-02-03 PROCEDURE — 96374 THER/PROPH/DIAG INJ IV PUSH: CPT

## 2019-02-03 PROCEDURE — 87086 URINE CULTURE/COLONY COUNT: CPT

## 2019-02-03 PROCEDURE — 85014 HEMATOCRIT: CPT

## 2019-02-03 PROCEDURE — 83605 ASSAY OF LACTIC ACID: CPT

## 2019-02-03 PROCEDURE — 80053 COMPREHEN METABOLIC PANEL: CPT

## 2019-02-03 PROCEDURE — 74177 CT ABD & PELVIS W/CONTRAST: CPT

## 2019-02-03 PROCEDURE — 84295 ASSAY OF SERUM SODIUM: CPT

## 2019-02-03 PROCEDURE — 87040 BLOOD CULTURE FOR BACTERIA: CPT

## 2019-02-03 PROCEDURE — 83690 ASSAY OF LIPASE: CPT

## 2019-02-03 PROCEDURE — 87324 CLOSTRIDIUM AG IA: CPT

## 2019-02-03 PROCEDURE — 82947 ASSAY GLUCOSE BLOOD QUANT: CPT

## 2019-02-03 PROCEDURE — 82330 ASSAY OF CALCIUM: CPT

## 2019-02-03 PROCEDURE — 96375 TX/PRO/DX INJ NEW DRUG ADDON: CPT

## 2019-02-03 PROCEDURE — 82435 ASSAY OF BLOOD CHLORIDE: CPT

## 2019-02-03 PROCEDURE — 85027 COMPLETE CBC AUTOMATED: CPT

## 2019-02-03 PROCEDURE — 93005 ELECTROCARDIOGRAM TRACING: CPT

## 2019-02-03 PROCEDURE — G0378: CPT

## 2019-02-03 PROCEDURE — 87449 NOS EACH ORGANISM AG IA: CPT

## 2019-02-03 PROCEDURE — 82803 BLOOD GASES ANY COMBINATION: CPT

## 2019-02-03 PROCEDURE — 84132 ASSAY OF SERUM POTASSIUM: CPT

## 2019-02-03 PROCEDURE — 81001 URINALYSIS AUTO W/SCOPE: CPT

## 2019-02-03 PROCEDURE — 71046 X-RAY EXAM CHEST 2 VIEWS: CPT

## 2019-02-03 PROCEDURE — 80048 BASIC METABOLIC PNL TOTAL CA: CPT

## 2019-02-03 PROCEDURE — 93306 TTE W/DOPPLER COMPLETE: CPT

## 2019-02-03 PROCEDURE — 87507 IADNA-DNA/RNA PROBE TQ 12-25: CPT

## 2019-02-03 PROCEDURE — 96376 TX/PRO/DX INJ SAME DRUG ADON: CPT

## 2019-02-03 PROCEDURE — 99285 EMERGENCY DEPT VISIT HI MDM: CPT | Mod: 25

## 2019-02-03 RX ORDER — FAMOTIDINE 10 MG/ML
1 INJECTION INTRAVENOUS
Qty: 30 | Refills: 0
Start: 2019-02-03 | End: 2019-03-04

## 2019-02-03 RX ORDER — CIPROFLOXACIN LACTATE 400MG/40ML
1 VIAL (ML) INTRAVENOUS
Qty: 12 | Refills: 0 | OUTPATIENT
Start: 2019-02-03 | End: 2019-02-08

## 2019-02-03 RX ORDER — METRONIDAZOLE 500 MG
1 TABLET ORAL
Qty: 18 | Refills: 0 | OUTPATIENT
Start: 2019-02-03 | End: 2019-02-08

## 2019-02-03 RX ORDER — METRONIDAZOLE 500 MG
500 TABLET ORAL EVERY 8 HOURS
Qty: 0 | Refills: 0 | Status: DISCONTINUED | OUTPATIENT
Start: 2019-02-03 | End: 2019-02-03

## 2019-02-03 RX ORDER — NICOTINE POLACRILEX 2 MG
1 GUM BUCCAL
Qty: 30 | Refills: 0
Start: 2019-02-03 | End: 2019-03-04

## 2019-02-03 RX ORDER — CIPROFLOXACIN LACTATE 400MG/40ML
500 VIAL (ML) INTRAVENOUS EVERY 12 HOURS
Qty: 0 | Refills: 0 | Status: DISCONTINUED | OUTPATIENT
Start: 2019-02-03 | End: 2019-02-03

## 2019-02-03 RX ORDER — ACETAMINOPHEN 500 MG
2 TABLET ORAL
Qty: 0 | Refills: 0 | DISCHARGE
Start: 2019-02-03

## 2019-02-03 RX ADMIN — PANTOPRAZOLE SODIUM 40 MILLIGRAM(S): 20 TABLET, DELAYED RELEASE ORAL at 05:17

## 2019-02-03 RX ADMIN — Medication 500 MILLIGRAM(S): at 14:31

## 2019-02-03 RX ADMIN — Medication 100 MILLIGRAM(S): at 11:15

## 2019-02-03 RX ADMIN — Medication 30 MILLILITER(S): at 14:31

## 2019-02-03 RX ADMIN — Medication 30 MILLILITER(S): at 05:17

## 2019-02-03 RX ADMIN — ANASTROZOLE 1 MILLIGRAM(S): 1 TABLET ORAL at 11:15

## 2019-02-03 RX ADMIN — Medication 1 PATCH: at 11:25

## 2019-02-03 RX ADMIN — Medication 1 PATCH: at 06:18

## 2019-02-03 RX ADMIN — SERTRALINE 200 MILLIGRAM(S): 25 TABLET, FILM COATED ORAL at 11:15

## 2019-02-03 RX ADMIN — HEPARIN SODIUM 5000 UNIT(S): 5000 INJECTION INTRAVENOUS; SUBCUTANEOUS at 09:07

## 2019-02-03 RX ADMIN — Medication 1 PATCH: at 11:14

## 2019-02-03 RX ADMIN — Medication 100 MILLIGRAM(S): at 05:17

## 2019-02-03 RX ADMIN — FAMOTIDINE 20 MILLIGRAM(S): 10 INJECTION INTRAVENOUS at 09:07

## 2019-02-03 RX ADMIN — Medication 30 MILLILITER(S): at 09:07

## 2019-02-03 NOTE — CHART NOTE - NSCHARTNOTEFT_GEN_A_CORE
feels well.  No overnight events.  Denied cp, sob, n/v/d.  no abdominal pain. No HA/dizziness.   walking around on the [place].  tolerating diet.  d/c planning home today on Cipro/flagyl 10 days total course.  Prilosec 20 mg for GERD.   d/w pt and NP. d/w GI Dr. Carl.  outpt f/u with GI Dr. Salguero for EGD/colonoscopy in 2-3 months.     - [facility] B[facility]AN [facility]  - [phone]

## 2019-02-03 NOTE — PROGRESS NOTE ADULT - REASON FOR ADMISSION
4 days of colicky lower quadrants abdominal pain, loose BM, chills.

## 2019-02-03 NOTE — PROGRESS NOTE ADULT - ASSESSMENT
Impression  59F admitted with lower abdominal pain preceded by a single episode of mucoid stool and CT imaging c/w uncomplicated sigmoid diverticulitis vs colitis      Rec  Low residue / dairy free diet  Prilosec 20 d  Cipro / Flagyl PO for total 7-10 d  F/U with Dr Salguero in 1-2 weeks  Interval colonoscopy in 6-8 weeks with Dr Salguero to assess for healing and rule out alternative diagnosis including infectious/inflammatory colitis, segmental colitis, underlying lesion.    [phone]

## 2019-02-03 NOTE — PROGRESS NOTE ADULT - SUBJECTIVE AND OBJECTIVE BOX
Feels much better  Some reflux but no abd pain  No BM yesterday after dinner or today      Vital Signs Last 24 Hrs  T(C): 36.6 (03 Feb 2019 09:28), Max: 36.9 (02 Feb 2019 13:36)  T(F): 97.8 (03 Feb 2019 09:28), Max: 98.4 (02 Feb 2019 13:36)  HR: 77 (03 Feb 2019 09:28) (69 - 83)  BP: 143/76 (03 Feb 2019 09:28) (120/74 - 152/88)  BP(mean): --  RR: 17 (03 Feb 2019 09:28) (17 - 18)  SpO2: 97% (03 Feb 2019 09:28) (96% - 100%)    PHYSICAL EXAM:    Constitutional: NAD, well-developed  Neck: No LAD, supple  Respiratory: CTA and P  Cardiovascular: S1 and S2, RRR, no M  Gastrointestinal: BS+, soft, NT/ND, neg HSM,  Extremities: No peripheral edema, neg clubing, cyanosis  Vascular: 2+ peripheral pulses  Neurological: A/O x 3, no focal deficits  Psychiatric: Normal mood, normal affect  Skin: No rashes    MEDICATIONS  (STANDING):  anastrozole 1 milliGRAM(s) Oral daily  ciprofloxacin   IVPB 200 milliGRAM(s) IV Intermittent every 12 hours  famotidine    Tablet 20 milliGRAM(s) Oral daily  heparin  Injectable 5000 Unit(s) SubCutaneous every 12 hours  metroNIDAZOLE  IVPB 500 milliGRAM(s) IV Intermittent every 8 hours  nicotine -  14 mG/24Hr(s) Patch 1 patch Transdermal daily  pantoprazole  Injectable 40 milliGRAM(s) IV Push two times a day  sertraline 200 milliGRAM(s) Oral daily    MEDICATIONS  (PRN):  acetaminophen   Tablet .. 650 milliGRAM(s) Oral every 6 hours PRN Mild Pain (1 - 3)  ALPRAZolam 0.5 milliGRAM(s) Oral daily PRN anxiety/sleep  aluminum hydroxide/magnesium hydroxide/simethicone Suspension 30 milliLiter(s) Oral every 4 hours PRN Dyspepsia  morphine  - Injectable 2 milliGRAM(s) IV Push every 4 hours PRN Moderate Pain (4 - 6)      Allergies    latex (Unknown)  No Known Drug Allergies    Intolerances          LABS:                        13.7   7.1   )-----------( 228      ( 01 Feb 2019 07:19 )             39.3     02-02    139  |  106  |  5<L>  ----------------------------<  139<H>  3.6   |  22  |  0.76    Ca    8.4      02 Feb 2019 06:58                RADIOLOGY & ADDITIONAL TESTS:

## 2019-02-05 LAB
CULTURE RESULTS: SIGNIFICANT CHANGE UP
CULTURE RESULTS: SIGNIFICANT CHANGE UP
SPECIMEN SOURCE: SIGNIFICANT CHANGE UP
SPECIMEN SOURCE: SIGNIFICANT CHANGE UP

## 2019-02-16 ENCOUNTER — INPATIENT (INPATIENT)
Facility: HOSPITAL | Age: 59
LOS: 5 days | Discharge: HOME | DRG: 872 | End: 2019-02-22
Attending: HOSPITALIST | Admitting: HOSPITALIST
Payer: COMMERCIAL

## 2019-02-16 VITALS
WEIGHT: 104.94 LBS | SYSTOLIC BLOOD PRESSURE: 99 MMHG | HEART RATE: 104 BPM | OXYGEN SATURATION: 98 % | DIASTOLIC BLOOD PRESSURE: 65 MMHG | HEIGHT: 62 IN | TEMPERATURE: 98 F | RESPIRATION RATE: 18 BRPM

## 2019-02-16 DIAGNOSIS — F17.200 NICOTINE DEPENDENCE, UNSPECIFIED, UNCOMPLICATED: ICD-10-CM

## 2019-02-16 DIAGNOSIS — A41.9 SEPSIS, UNSPECIFIED ORGANISM: ICD-10-CM

## 2019-02-16 DIAGNOSIS — K51.00 ULCERATIVE (CHRONIC) PANCOLITIS WITHOUT COMPLICATIONS: ICD-10-CM

## 2019-02-16 DIAGNOSIS — K21.9 GASTRO-ESOPHAGEAL REFLUX DISEASE WITHOUT ESOPHAGITIS: ICD-10-CM

## 2019-02-16 DIAGNOSIS — F41.9 ANXIETY DISORDER, UNSPECIFIED: ICD-10-CM

## 2019-02-16 LAB
ALBUMIN SERPL ELPH-MCNC: 4.4 G/DL — SIGNIFICANT CHANGE UP (ref 3.3–5)
ALP SERPL-CCNC: 56 U/L — SIGNIFICANT CHANGE UP (ref 40–120)
ALT FLD-CCNC: 21 U/L — SIGNIFICANT CHANGE UP (ref 10–45)
ANION GAP SERPL CALC-SCNC: 17 MMOL/L — SIGNIFICANT CHANGE UP (ref 5–17)
APPEARANCE UR: ABNORMAL
AST SERPL-CCNC: 26 U/L — SIGNIFICANT CHANGE UP (ref 10–40)
BASOPHILS # BLD AUTO: 0.1 K/UL — SIGNIFICANT CHANGE UP (ref 0–0.2)
BASOPHILS NFR BLD AUTO: 0 % — SIGNIFICANT CHANGE UP (ref 0–2)
BILIRUB SERPL-MCNC: 0.3 MG/DL — SIGNIFICANT CHANGE UP (ref 0.2–1.2)
BILIRUB UR-MCNC: NEGATIVE — SIGNIFICANT CHANGE UP
BUN SERPL-MCNC: 13 MG/DL — SIGNIFICANT CHANGE UP (ref 7–23)
CALCIUM SERPL-MCNC: 9 MG/DL — SIGNIFICANT CHANGE UP (ref 8.4–10.5)
CHLORIDE SERPL-SCNC: 97 MMOL/L — SIGNIFICANT CHANGE UP (ref 96–108)
CO2 SERPL-SCNC: 22 MMOL/L — SIGNIFICANT CHANGE UP (ref 22–31)
COLOR SPEC: YELLOW — SIGNIFICANT CHANGE UP
CREAT SERPL-MCNC: 0.78 MG/DL — SIGNIFICANT CHANGE UP (ref 0.5–1.3)
DIFF PNL FLD: NEGATIVE — SIGNIFICANT CHANGE UP
EOSINOPHIL # BLD AUTO: 0 K/UL — SIGNIFICANT CHANGE UP (ref 0–0.5)
EOSINOPHIL NFR BLD AUTO: 0 % — SIGNIFICANT CHANGE UP (ref 0–6)
GAS PNL BLDV: SIGNIFICANT CHANGE UP
GLUCOSE SERPL-MCNC: 111 MG/DL — HIGH (ref 70–99)
GLUCOSE UR QL: NEGATIVE — SIGNIFICANT CHANGE UP
HCT VFR BLD CALC: 33.8 % — LOW (ref 34.5–45)
HGB BLD-MCNC: 12.1 G/DL — SIGNIFICANT CHANGE UP (ref 11.5–15.5)
KETONES UR-MCNC: ABNORMAL
LEUKOCYTE ESTERASE UR-ACNC: ABNORMAL
LIDOCAIN IGE QN: 37 U/L — SIGNIFICANT CHANGE UP (ref 7–60)
LYMPHOCYTES # BLD AUTO: 1.5 K/UL — SIGNIFICANT CHANGE UP (ref 1–3.3)
LYMPHOCYTES # BLD AUTO: 8 % — LOW (ref 13–44)
MCHC RBC-ENTMCNC: 32.6 PG — SIGNIFICANT CHANGE UP (ref 27–34)
MCHC RBC-ENTMCNC: 35.7 GM/DL — SIGNIFICANT CHANGE UP (ref 32–36)
MCV RBC AUTO: 91.2 FL — SIGNIFICANT CHANGE UP (ref 80–100)
MONOCYTES # BLD AUTO: 1.7 K/UL — HIGH (ref 0–0.9)
MONOCYTES NFR BLD AUTO: 15 % — HIGH (ref 2–14)
NEUTROPHILS # BLD AUTO: 9.8 K/UL — HIGH (ref 1.8–7.4)
NEUTROPHILS NFR BLD AUTO: 66 % — SIGNIFICANT CHANGE UP (ref 43–77)
NITRITE UR-MCNC: NEGATIVE — SIGNIFICANT CHANGE UP
PH UR: 6.5 — SIGNIFICANT CHANGE UP (ref 5–8)
PLATELET # BLD AUTO: 245 K/UL — SIGNIFICANT CHANGE UP (ref 150–400)
POTASSIUM SERPL-MCNC: 3.4 MMOL/L — LOW (ref 3.5–5.3)
POTASSIUM SERPL-SCNC: 3.4 MMOL/L — LOW (ref 3.5–5.3)
PROT SERPL-MCNC: 7.4 G/DL — SIGNIFICANT CHANGE UP (ref 6–8.3)
PROT UR-MCNC: 100 — SIGNIFICANT CHANGE UP
RBC # BLD: 3.71 M/UL — LOW (ref 3.8–5.2)
RBC # FLD: 11.4 % — SIGNIFICANT CHANGE UP (ref 10.3–14.5)
SODIUM SERPL-SCNC: 136 MMOL/L — SIGNIFICANT CHANGE UP (ref 135–145)
SP GR SPEC: 1.03 — HIGH (ref 1.01–1.02)
UROBILINOGEN FLD QL: NEGATIVE — SIGNIFICANT CHANGE UP
WBC # BLD: 13.1 K/UL — HIGH (ref 3.8–10.5)
WBC # FLD AUTO: 13.1 K/UL — HIGH (ref 3.8–10.5)

## 2019-02-16 PROCEDURE — 99223 1ST HOSP IP/OBS HIGH 75: CPT

## 2019-02-16 PROCEDURE — 74177 CT ABD & PELVIS W/CONTRAST: CPT | Mod: 26

## 2019-02-16 PROCEDURE — 99285 EMERGENCY DEPT VISIT HI MDM: CPT

## 2019-02-16 RX ORDER — KETOROLAC TROMETHAMINE 30 MG/ML
15 SYRINGE (ML) INJECTION ONCE
Qty: 0 | Refills: 0 | Status: DISCONTINUED | OUTPATIENT
Start: 2019-02-16 | End: 2019-02-16

## 2019-02-16 RX ORDER — MORPHINE SULFATE 50 MG/1
4 CAPSULE, EXTENDED RELEASE ORAL ONCE
Qty: 0 | Refills: 0 | Status: DISCONTINUED | OUTPATIENT
Start: 2019-02-16 | End: 2019-02-16

## 2019-02-16 RX ORDER — ACETAMINOPHEN 500 MG
1000 TABLET ORAL ONCE
Qty: 0 | Refills: 0 | Status: COMPLETED | OUTPATIENT
Start: 2019-02-16 | End: 2019-02-16

## 2019-02-16 RX ORDER — ACETAMINOPHEN 500 MG
650 TABLET ORAL EVERY 6 HOURS
Qty: 0 | Refills: 0 | Status: DISCONTINUED | OUTPATIENT
Start: 2019-02-16 | End: 2019-02-20

## 2019-02-16 RX ORDER — SODIUM CHLORIDE 9 MG/ML
1000 INJECTION INTRAMUSCULAR; INTRAVENOUS; SUBCUTANEOUS
Qty: 0 | Refills: 0 | Status: DISCONTINUED | OUTPATIENT
Start: 2019-02-16 | End: 2019-02-21

## 2019-02-16 RX ORDER — ALPRAZOLAM 0.25 MG
0.5 TABLET ORAL ONCE
Qty: 0 | Refills: 0 | Status: DISCONTINUED | OUTPATIENT
Start: 2019-02-16 | End: 2019-02-16

## 2019-02-16 RX ORDER — ANASTROZOLE 1 MG/1
1 TABLET ORAL DAILY
Qty: 0 | Refills: 0 | Status: DISCONTINUED | OUTPATIENT
Start: 2019-02-16 | End: 2019-02-22

## 2019-02-16 RX ORDER — SERTRALINE 25 MG/1
200 TABLET, FILM COATED ORAL DAILY
Qty: 0 | Refills: 0 | Status: DISCONTINUED | OUTPATIENT
Start: 2019-02-16 | End: 2019-02-22

## 2019-02-16 RX ORDER — SODIUM CHLORIDE 9 MG/ML
1000 INJECTION INTRAMUSCULAR; INTRAVENOUS; SUBCUTANEOUS ONCE
Qty: 0 | Refills: 0 | Status: COMPLETED | OUTPATIENT
Start: 2019-02-16 | End: 2019-02-16

## 2019-02-16 RX ORDER — HEPARIN SODIUM 5000 [USP'U]/ML
5000 INJECTION INTRAVENOUS; SUBCUTANEOUS EVERY 8 HOURS
Qty: 0 | Refills: 0 | Status: DISCONTINUED | OUTPATIENT
Start: 2019-02-16 | End: 2019-02-22

## 2019-02-16 RX ORDER — VANCOMYCIN HCL 1 G
1000 VIAL (EA) INTRAVENOUS ONCE
Qty: 0 | Refills: 0 | Status: COMPLETED | OUTPATIENT
Start: 2019-02-16 | End: 2019-02-16

## 2019-02-16 RX ORDER — PANTOPRAZOLE SODIUM 20 MG/1
40 TABLET, DELAYED RELEASE ORAL
Qty: 0 | Refills: 0 | Status: DISCONTINUED | OUTPATIENT
Start: 2019-02-16 | End: 2019-02-17

## 2019-02-16 RX ORDER — PIPERACILLIN AND TAZOBACTAM 4; .5 G/20ML; G/20ML
3.38 INJECTION, POWDER, LYOPHILIZED, FOR SOLUTION INTRAVENOUS ONCE
Qty: 0 | Refills: 0 | Status: COMPLETED | OUTPATIENT
Start: 2019-02-16 | End: 2019-02-16

## 2019-02-16 RX ORDER — MORPHINE SULFATE 50 MG/1
1 CAPSULE, EXTENDED RELEASE ORAL
Qty: 0 | Refills: 0 | Status: DISCONTINUED | OUTPATIENT
Start: 2019-02-16 | End: 2019-02-22

## 2019-02-16 RX ORDER — MORPHINE SULFATE 50 MG/1
2 CAPSULE, EXTENDED RELEASE ORAL EVERY 4 HOURS
Qty: 0 | Refills: 0 | Status: DISCONTINUED | OUTPATIENT
Start: 2019-02-16 | End: 2019-02-22

## 2019-02-16 RX ORDER — PIPERACILLIN AND TAZOBACTAM 4; .5 G/20ML; G/20ML
3.38 INJECTION, POWDER, LYOPHILIZED, FOR SOLUTION INTRAVENOUS EVERY 8 HOURS
Qty: 0 | Refills: 0 | Status: DISCONTINUED | OUTPATIENT
Start: 2019-02-16 | End: 2019-02-18

## 2019-02-16 RX ORDER — NICOTINE POLACRILEX 2 MG
1 GUM BUCCAL DAILY
Qty: 0 | Refills: 0 | Status: DISCONTINUED | OUTPATIENT
Start: 2019-02-16 | End: 2019-02-22

## 2019-02-16 RX ADMIN — Medication 1000 MILLIGRAM(S): at 15:45

## 2019-02-16 RX ADMIN — Medication 250 MILLIGRAM(S): at 14:00

## 2019-02-16 RX ADMIN — Medication 15 MILLIGRAM(S): at 17:58

## 2019-02-16 RX ADMIN — Medication 1000 MILLIGRAM(S): at 15:25

## 2019-02-16 RX ADMIN — SODIUM CHLORIDE 75 MILLILITER(S): 9 INJECTION INTRAMUSCULAR; INTRAVENOUS; SUBCUTANEOUS at 18:50

## 2019-02-16 RX ADMIN — MORPHINE SULFATE 2 MILLIGRAM(S): 50 CAPSULE, EXTENDED RELEASE ORAL at 22:04

## 2019-02-16 RX ADMIN — HEPARIN SODIUM 5000 UNIT(S): 5000 INJECTION INTRAVENOUS; SUBCUTANEOUS at 22:03

## 2019-02-16 RX ADMIN — Medication 30 MILLILITER(S): at 22:03

## 2019-02-16 RX ADMIN — SODIUM CHLORIDE 1000 MILLILITER(S): 9 INJECTION INTRAMUSCULAR; INTRAVENOUS; SUBCUTANEOUS at 10:45

## 2019-02-16 RX ADMIN — SODIUM CHLORIDE 1000 MILLILITER(S): 9 INJECTION INTRAMUSCULAR; INTRAVENOUS; SUBCUTANEOUS at 11:45

## 2019-02-16 RX ADMIN — Medication 400 MILLIGRAM(S): at 14:45

## 2019-02-16 RX ADMIN — PIPERACILLIN AND TAZOBACTAM 200 GRAM(S): 4; .5 INJECTION, POWDER, LYOPHILIZED, FOR SOLUTION INTRAVENOUS at 13:28

## 2019-02-16 RX ADMIN — PIPERACILLIN AND TAZOBACTAM 3.38 GRAM(S): 4; .5 INJECTION, POWDER, LYOPHILIZED, FOR SOLUTION INTRAVENOUS at 14:00

## 2019-02-16 RX ADMIN — MORPHINE SULFATE 4 MILLIGRAM(S): 50 CAPSULE, EXTENDED RELEASE ORAL at 13:55

## 2019-02-16 RX ADMIN — MORPHINE SULFATE 2 MILLIGRAM(S): 50 CAPSULE, EXTENDED RELEASE ORAL at 22:20

## 2019-02-16 RX ADMIN — MORPHINE SULFATE 4 MILLIGRAM(S): 50 CAPSULE, EXTENDED RELEASE ORAL at 11:30

## 2019-02-16 RX ADMIN — Medication 15 MILLIGRAM(S): at 18:15

## 2019-02-16 RX ADMIN — Medication 0.5 MILLIGRAM(S): at 22:03

## 2019-02-16 RX ADMIN — MORPHINE SULFATE 4 MILLIGRAM(S): 50 CAPSULE, EXTENDED RELEASE ORAL at 13:21

## 2019-02-16 RX ADMIN — PIPERACILLIN AND TAZOBACTAM 25 GRAM(S): 4; .5 INJECTION, POWDER, LYOPHILIZED, FOR SOLUTION INTRAVENOUS at 22:03

## 2019-02-16 RX ADMIN — MORPHINE SULFATE 4 MILLIGRAM(S): 50 CAPSULE, EXTENDED RELEASE ORAL at 10:48

## 2019-02-16 NOTE — ED PROVIDER NOTE - ATTENDING CONTRIBUTION TO CARE
attending Jose De Jesus: 59yF with recent admission for uncomplicated diverticulitis s/p course cipro/flagyl p/w several days worsening diffuse abdominal pain similar to prior diverticulitis. No fever, chills, bloody stools, vomiting. Cannot eat 2/2 pain. No longer on abx. On exam, VSS, well-appearing, thin, abdomen nondistended, diffusely tender without r/g/r, dry MM. Will obtain labs, UA, CT A/P eval for recurrent/complicated diverticulitis, pain control and reassess

## 2019-02-16 NOTE — ED ADULT NURSE NOTE - ED STAT RN HANDOFF DETAILS
hand off given to oncoming RN Venessa Tineo. Pt TBA. Awating bed. c/o HA. Ofirmev being given. Continues to c/o abd pain, less than earlier

## 2019-02-16 NOTE — ED ADULT NURSE NOTE - NSIMPLEMENTINTERV_GEN_ALL_ED
Implemented All Universal Safety Interventions:  [place] to call system. Call bell, personal items and telephone within reach. Instruct patient to call for assistance. Room bathroom lighting operational. Non-slip footwear when patient is off stretcher. Physically safe environment: no spills, clutter or unnecessary equipment. Stretcher in lowest position, wheels locked, appropriate side rails in place.

## 2019-02-16 NOTE — H&P ADULT - PROBLEM SELECTOR PLAN 1
Pt presented with diffuse abdominal pain a/w multiple episodes of diarrhea. CT abdomen with pancolitis, worse in the sigmoid colon. ddx includes infectious vs inflammatory etiology.  - Given sepsis criteria on admission will start Zosyn  - check stool studies. check bcx  - GI consult  - NPO except med's and ice chips for now  - IVF hydration while NPO  - May benefit from colorectal surgery consult

## 2019-02-16 NOTE — H&P ADULT - PROBLEM SELECTOR PLAN 2
Met sepsis criteria on admission with elevation HR > 100 and WBC with source likely colonic in origin.   - c/w abx as above

## 2019-02-16 NOTE — H&P ADULT - ASSESSMENT
60 y/o female with h/o history of breast CA with RIGHT MRM presumed to be in remission, breast implants, moderate tobacco use since age 15, depression who presented for diffuse abdominal pain found to have pancolitis on CT imaging

## 2019-02-16 NOTE — H&P ADULT - NSHPPHYSICALEXAM_GEN_ALL_CORE
.  VITAL SIGNS:  T(C): 36.7 (02-16-19 @ 17:45), Max: 37.4 (02-16-19 @ 11:54)  T(F): 98.1 (02-16-19 @ 17:45), Max: 99.3 (02-16-19 @ 11:54)  HR: 60 (02-16-19 @ 17:45) (60 - 104)  BP: 103/61 (02-16-19 @ 17:45) (99/60 - 119/75)  BP(mean): --  RR: 16 (02-16-19 @ 17:45) (16 - 18)  SpO2: 96% (02-16-19 @ 17:45) (96% - 98%)  Wt(kg): --    PHYSICAL EXAM:    Constitutional: WDWN resting comfortably in bed; NAD  Head: NC/AT  Eyes: PERRL, EOMI, anicteric sclera  ENT: no nasal discharge; uvula midline, no oropharyngeal erythema or exudates; MMM  Neck: supple; no JVD or thyromegaly  Respiratory: CTA B/L; no W/R/R, no retractions  Cardiac: +S1/S2; RRR; no M/R/G; PMI non-displaced  Gastrointestinal: soft, TTP diffusely; no rebound or guarding; +BSx4  Extremities: WWP, no clubbing or cyanosis; no peripheral edema  Musculoskeletal: NROM x4; no joint swelling, tenderness or erythema  Vascular: 2+ radial, femoral, DP/PT pulses B/L  Neurologic: AAOx3; CNII-XII grossly intact; no focal deficits  Psychiatric: affect and characteristics of appearance, verbalizations, behaviors are appropriate

## 2019-02-16 NOTE — ED ADULT NURSE NOTE - OBJECTIVE STATEMENT
0945 59 yr old WF brought to ER by  for further eval and tx of severe lower abd pain. Was hospitalized 2 wks ago for 5 days for tx of diverticulitis. A&Ox4. Color pink. Skin W&D. Lungs clear. Denies N/V/D, fever, chills or dysuria. Abd soft. sore to palp diffusely lower abd.

## 2019-02-16 NOTE — ED PROVIDER NOTE - NS ED ROS FT
ROS:  GENERAL: No fever, no chills  EYES: no change in vision  HEENT: no trouble swallowing, no trouble speaking  CARDIAC: no chest pain  PULMONARY: no cough, no shortness of breath  GI:+ abdominal pain, no nausea, no vomiting, + diarrhea, no constipation  : No dysuria, no frequency, no change in appearance, or odor of urine  SKIN: no rashes  NEURO: no headache,+ weakness  MSK: No joint pain  ~Isaiah Bolanos D.O. -Resident

## 2019-02-16 NOTE — H&P ADULT - NSHPLABSRESULTS_GEN_ALL_CORE
.  LABS:                         12.1   13.1  )-----------( 245      ( 2019 10:59 )             33.8     02-16    136  |  97  |  13  ----------------------------<  111<H>  3.4<L>   |  22  |  0.78    Ca    9.0      2019 10:59    TPro  7.4  /  Alb  4.4  /  TBili  0.3  /  DBili  x   /  AST  26  /  ALT  21  /  AlkPhos  56  02-      Urinalysis Basic - ( 2019 16:09 )    Color: Yellow / Appearance: Slightly Turbid / S.028 / pH: x  Gluc: x / Ketone: Large  / Bili: Negative / Urobili: Negative   Blood: x / Protein: 100 / Nitrite: Negative   Leuk Esterase: Moderate / RBC: 5 /hpf / WBC 8 /HPF   Sq Epi: x / Non Sq Epi: 4 /hpf / Bacteria: Negative                RADIOLOGY, EKG & ADDITIONAL TESTS: Reviewed.     CT abdomen: EXAM:  CT ABDOMEN AND PELVIS OC IC                            PROCEDURE DATE:  2019            INTERPRETATION:  CLINICAL INFORMATION: Left lower quadrant pain. Question   diverticulitis.    COMPARISON: CT abdomen/pelvis 2019 and 2017.    PROCEDURE:   CT of the Abdomen and Pelvis was performed with intravenous contrast.   Intravenous contrast: 90 ml Omnipaque 350. 10 ml discarded.  Oral contrast: positive contrast was administered.  Sagittal and coronal reformats were performed.    FINDINGS:    LOWER CHEST: Partially visualized bilateral breast implants. Calcified   left lower lobe granuloma.    LIVER: Within normal limits.  BILE DUCTS: Normal caliber.  GALLBLADDER: Within normal limits.  SPLEEN: Within normal limits.  PANCREAS: Within normal limits.  ADRENALS: Within normal limits.  KIDNEYS/URETERS: Unchanged left renal hypodensity which is too small to   characterize. Symmetrical enhancement. No hydronephrosis.    BLADDER: Within normal limits.  REPRODUCTIVE ORGANS: Unremarkable CT appearance of the uterus and adnexa.    BOWEL: Diverticulosis. There is a long segment of sigmoid colon which   demonstrates circumferential thickening and adjacent stranding of the   fat, slightly increased when compared with 2019 and consistent with   colitis. There is also low density mural thickening involving the cecum,   ascending colon, and to a lesser extent the transverse and descending   colon with regions of narrowing, new when compared with 2017. There is no   significant adjacent inflammation. The terminal ileum and the appendix   are normal. No bowel obstruction.  PERITONEUM: No drainable fluid collection. Trace pelvic free fluid.  VESSELS:  Normal caliber abdominal aorta with atherosclerotic disease.  RETROPERITONEUM: No lymphadenopathy.    ABDOMINAL WALL: Within normal limits.  BONES: Degenerative changes of the spine.    IMPRESSION:     Pancolitis, worse in the sigmoid colon. Differential includes infectious   and inflammatory etiologies.

## 2019-02-16 NOTE — H&P ADULT - HISTORY OF PRESENT ILLNESS
This is a 60 y/o female with h/o history of breast CA with RIGHT MRM presumed to be in remission, breast implants, moderate tobacco use since age 15, depression who presented for diffuse abdominal pain. Of note, pt was recently admitted to [facility] found to have diverticulitis and was discharged home on Cipro/Flagyl on 2/3 to complete course. She completed the course of abx, went to see her GI doc who advised her to liberalize her diet. After doing so she began to experience worsening abdominal pain a/w diarrhea. Pt reports multiple episodes of diarrhea daily, decribed as yellowish with lots of mucus. Has only been able to keep down liquids. No fever/chills. . N/V.

## 2019-02-16 NOTE — H&P ADULT - NSHPREVIEWOFSYSTEMS_GEN_ALL_CORE
CONSTITUTIONAL: No weakness, fevers or chills  EYES/ENT: No visual changes;  No vertigo or throat pain   NECK: No pain or stiffness  RESPIRATORY: No cough, wheezing, hemoptysis; No shortness of breath  CARDIOVASCULAR: No chest pain or palpitations  GASTROINTESTINAL: +abdominal pain. No nausea, vomiting, or hematemesis; +diarrhea. No melena or hematochezia.  GENITOURINARY: No dysuria, frequency or hematuria  NEUROLOGICAL: No numbness or weakness  SKIN: No itching, burning, rashes, or lesions   All other review of systems is negative unless indicated above.

## 2019-02-16 NOTE — ED PROVIDER NOTE - CLINICAL SUMMARY MEDICAL DECISION MAKING FREE TEXT BOX
59f abdominal pain, concern for recurrence of diverticulitis vs complication including microperf/abscess, vss, will get labs, urine, ct abd/pelvis, analgesia, fluids, reassess

## 2019-02-16 NOTE — ED PROVIDER NOTE - PHYSICAL EXAMINATION
Physical Exam:  Gen: NAD, AOx3, non-toxic appearing, able to ambulate without assistance  Head: NCAT  HEENT: EOMI, PEERLA, normal conjunctiva, tongue midline, oral mucosa dry  Lung: CTAB, no respiratory distress, no wheezes/rhonchi/rales B/L, speaking in full sentences  CV: RRR, no murmurs, rubs or gallops  Abd: soft, diffusely tender, greatest in LLQ,  mild guarding, no rigidity, no CVA tenderness   MSK: no visible deformities, ROM normal in UE/LE, no back pain  Neuro: No focal sensory or motor deficits  Skin: Warm, well perfused, no rash  Psych: normal affect, calm  ~Isaiah Bolanos D.O. -Resident

## 2019-02-16 NOTE — ED PROVIDER NOTE - OBJECTIVE STATEMENT
58yo f with BCA, right MRM, active smoker pw 5 days of diffuse abdominal pain and diarrhea. patient reports recently being dx with diverticulitis and being discharged from the hospital on 2/3 with cipro/flagyl. Reports pain improved and she finished her abx but 4 days ago started to have diarrhea and abdominal cramps diffusely.  denies f/c/n/v, chest pain, shortness of breath, rashes, headache. Reports pain is similar to previous episode of diverticulitis. denies blood in diarrhea. 3 csections in past. unable to tolerate po, decreased appetite and weakness over last few days.     gi- dr. maldonado  pcp- Dr. Wolfe

## 2019-02-17 LAB
ANION GAP SERPL CALC-SCNC: 14 MMOL/L — SIGNIFICANT CHANGE UP (ref 5–17)
BUN SERPL-MCNC: 9 MG/DL — SIGNIFICANT CHANGE UP (ref 7–23)
C DIFF GDH STL QL: SIGNIFICANT CHANGE UP
C DIFF GDH STL QL: SIGNIFICANT CHANGE UP
CALCIUM SERPL-MCNC: 8.2 MG/DL — LOW (ref 8.4–10.5)
CHLORIDE SERPL-SCNC: 102 MMOL/L — SIGNIFICANT CHANGE UP (ref 96–108)
CO2 SERPL-SCNC: 22 MMOL/L — SIGNIFICANT CHANGE UP (ref 22–31)
CREAT SERPL-MCNC: 0.79 MG/DL — SIGNIFICANT CHANGE UP (ref 0.5–1.3)
CULTURE RESULTS: SIGNIFICANT CHANGE UP
CULTURE RESULTS: SIGNIFICANT CHANGE UP
GLUCOSE SERPL-MCNC: 80 MG/DL — SIGNIFICANT CHANGE UP (ref 70–99)
HCT VFR BLD CALC: 30.9 % — LOW (ref 34.5–45)
HCV AB S/CO SERPL IA: 0.19 S/CO — SIGNIFICANT CHANGE UP
HCV AB SERPL-IMP: SIGNIFICANT CHANGE UP
HGB BLD-MCNC: 10.1 G/DL — LOW (ref 11.5–15.5)
MAGNESIUM SERPL-MCNC: 2.1 MG/DL — SIGNIFICANT CHANGE UP (ref 1.6–2.6)
MCHC RBC-ENTMCNC: 30.6 PG — SIGNIFICANT CHANGE UP (ref 27–34)
MCHC RBC-ENTMCNC: 32.7 GM/DL — SIGNIFICANT CHANGE UP (ref 32–36)
MCV RBC AUTO: 93.6 FL — SIGNIFICANT CHANGE UP (ref 80–100)
PLATELET # BLD AUTO: 233 K/UL — SIGNIFICANT CHANGE UP (ref 150–400)
POTASSIUM SERPL-MCNC: 3.2 MMOL/L — LOW (ref 3.5–5.3)
POTASSIUM SERPL-SCNC: 3.2 MMOL/L — LOW (ref 3.5–5.3)
RBC # BLD: 3.3 M/UL — LOW (ref 3.8–5.2)
RBC # FLD: 13.1 % — SIGNIFICANT CHANGE UP (ref 10.3–14.5)
SODIUM SERPL-SCNC: 138 MMOL/L — SIGNIFICANT CHANGE UP (ref 135–145)
SPECIMEN SOURCE: SIGNIFICANT CHANGE UP
SPECIMEN SOURCE: SIGNIFICANT CHANGE UP
WBC # BLD: 6.46 K/UL — SIGNIFICANT CHANGE UP (ref 3.8–10.5)
WBC # FLD AUTO: 6.46 K/UL — SIGNIFICANT CHANGE UP (ref 3.8–10.5)

## 2019-02-17 RX ORDER — ALPRAZOLAM 0.25 MG
0.5 TABLET ORAL ONCE
Qty: 0 | Refills: 0 | Status: DISCONTINUED | OUTPATIENT
Start: 2019-02-17 | End: 2019-02-17

## 2019-02-17 RX ORDER — VANCOMYCIN HCL 1 G
125 VIAL (EA) INTRAVENOUS EVERY 6 HOURS
Qty: 0 | Refills: 0 | Status: DISCONTINUED | OUTPATIENT
Start: 2019-02-17 | End: 2019-02-22

## 2019-02-17 RX ORDER — FAMOTIDINE 10 MG/ML
20 INJECTION INTRAVENOUS DAILY
Qty: 0 | Refills: 0 | Status: DISCONTINUED | OUTPATIENT
Start: 2019-02-17 | End: 2019-02-22

## 2019-02-17 RX ORDER — KETOROLAC TROMETHAMINE 30 MG/ML
15 SYRINGE (ML) INJECTION EVERY 8 HOURS
Qty: 0 | Refills: 0 | Status: DISCONTINUED | OUTPATIENT
Start: 2019-02-17 | End: 2019-02-22

## 2019-02-17 RX ORDER — PANTOPRAZOLE SODIUM 20 MG/1
40 TABLET, DELAYED RELEASE ORAL DAILY
Qty: 0 | Refills: 0 | Status: DISCONTINUED | OUTPATIENT
Start: 2019-02-17 | End: 2019-02-17

## 2019-02-17 RX ORDER — POTASSIUM CHLORIDE 20 MEQ
40 PACKET (EA) ORAL ONCE
Qty: 0 | Refills: 0 | Status: CANCELLED | OUTPATIENT
Start: 2019-02-17 | End: 2019-02-22

## 2019-02-17 RX ORDER — PANTOPRAZOLE SODIUM 20 MG/1
40 TABLET, DELAYED RELEASE ORAL
Qty: 0 | Refills: 0 | Status: DISCONTINUED | OUTPATIENT
Start: 2019-02-17 | End: 2019-02-22

## 2019-02-17 RX ADMIN — Medication 30 MILLILITER(S): at 07:55

## 2019-02-17 RX ADMIN — Medication 125 MILLIGRAM(S): at 17:14

## 2019-02-17 RX ADMIN — SERTRALINE 200 MILLIGRAM(S): 25 TABLET, FILM COATED ORAL at 05:21

## 2019-02-17 RX ADMIN — MORPHINE SULFATE 1 MILLIGRAM(S): 50 CAPSULE, EXTENDED RELEASE ORAL at 17:53

## 2019-02-17 RX ADMIN — Medication 125 MILLIGRAM(S): at 23:44

## 2019-02-17 RX ADMIN — Medication 0.5 MILLIGRAM(S): at 23:44

## 2019-02-17 RX ADMIN — MORPHINE SULFATE 1 MILLIGRAM(S): 50 CAPSULE, EXTENDED RELEASE ORAL at 18:08

## 2019-02-17 RX ADMIN — ANASTROZOLE 1 MILLIGRAM(S): 1 TABLET ORAL at 11:49

## 2019-02-17 RX ADMIN — MORPHINE SULFATE 1 MILLIGRAM(S): 50 CAPSULE, EXTENDED RELEASE ORAL at 20:09

## 2019-02-17 RX ADMIN — HEPARIN SODIUM 5000 UNIT(S): 5000 INJECTION INTRAVENOUS; SUBCUTANEOUS at 05:18

## 2019-02-17 RX ADMIN — SODIUM CHLORIDE 75 MILLILITER(S): 9 INJECTION INTRAMUSCULAR; INTRAVENOUS; SUBCUTANEOUS at 07:00

## 2019-02-17 RX ADMIN — MORPHINE SULFATE 2 MILLIGRAM(S): 50 CAPSULE, EXTENDED RELEASE ORAL at 22:30

## 2019-02-17 RX ADMIN — PIPERACILLIN AND TAZOBACTAM 25 GRAM(S): 4; .5 INJECTION, POWDER, LYOPHILIZED, FOR SOLUTION INTRAVENOUS at 05:19

## 2019-02-17 RX ADMIN — MORPHINE SULFATE 2 MILLIGRAM(S): 50 CAPSULE, EXTENDED RELEASE ORAL at 12:56

## 2019-02-17 RX ADMIN — MORPHINE SULFATE 2 MILLIGRAM(S): 50 CAPSULE, EXTENDED RELEASE ORAL at 07:54

## 2019-02-17 RX ADMIN — MORPHINE SULFATE 1 MILLIGRAM(S): 50 CAPSULE, EXTENDED RELEASE ORAL at 20:40

## 2019-02-17 RX ADMIN — Medication 30 MILLILITER(S): at 11:49

## 2019-02-17 RX ADMIN — MORPHINE SULFATE 1 MILLIGRAM(S): 50 CAPSULE, EXTENDED RELEASE ORAL at 10:53

## 2019-02-17 RX ADMIN — PANTOPRAZOLE SODIUM 40 MILLIGRAM(S): 20 TABLET, DELAYED RELEASE ORAL at 17:13

## 2019-02-17 RX ADMIN — MORPHINE SULFATE 2 MILLIGRAM(S): 50 CAPSULE, EXTENDED RELEASE ORAL at 03:30

## 2019-02-17 RX ADMIN — MORPHINE SULFATE 2 MILLIGRAM(S): 50 CAPSULE, EXTENDED RELEASE ORAL at 08:10

## 2019-02-17 RX ADMIN — Medication 1 PATCH: at 19:00

## 2019-02-17 RX ADMIN — MORPHINE SULFATE 2 MILLIGRAM(S): 50 CAPSULE, EXTENDED RELEASE ORAL at 03:17

## 2019-02-17 RX ADMIN — Medication 1 PATCH: at 17:15

## 2019-02-17 RX ADMIN — MORPHINE SULFATE 2 MILLIGRAM(S): 50 CAPSULE, EXTENDED RELEASE ORAL at 16:56

## 2019-02-17 RX ADMIN — PANTOPRAZOLE SODIUM 40 MILLIGRAM(S): 20 TABLET, DELAYED RELEASE ORAL at 05:19

## 2019-02-17 RX ADMIN — SODIUM CHLORIDE 75 MILLILITER(S): 9 INJECTION INTRAMUSCULAR; INTRAVENOUS; SUBCUTANEOUS at 18:13

## 2019-02-17 RX ADMIN — HEPARIN SODIUM 5000 UNIT(S): 5000 INJECTION INTRAVENOUS; SUBCUTANEOUS at 22:00

## 2019-02-17 RX ADMIN — PIPERACILLIN AND TAZOBACTAM 25 GRAM(S): 4; .5 INJECTION, POWDER, LYOPHILIZED, FOR SOLUTION INTRAVENOUS at 13:56

## 2019-02-17 RX ADMIN — Medication 30 MILLILITER(S): at 22:02

## 2019-02-17 RX ADMIN — HEPARIN SODIUM 5000 UNIT(S): 5000 INJECTION INTRAVENOUS; SUBCUTANEOUS at 13:58

## 2019-02-17 RX ADMIN — MORPHINE SULFATE 2 MILLIGRAM(S): 50 CAPSULE, EXTENDED RELEASE ORAL at 22:01

## 2019-02-17 RX ADMIN — PIPERACILLIN AND TAZOBACTAM 25 GRAM(S): 4; .5 INJECTION, POWDER, LYOPHILIZED, FOR SOLUTION INTRAVENOUS at 22:01

## 2019-02-17 RX ADMIN — MORPHINE SULFATE 2 MILLIGRAM(S): 50 CAPSULE, EXTENDED RELEASE ORAL at 16:41

## 2019-02-17 RX ADMIN — FAMOTIDINE 20 MILLIGRAM(S): 10 INJECTION INTRAVENOUS at 10:43

## 2019-02-17 RX ADMIN — MORPHINE SULFATE 2 MILLIGRAM(S): 50 CAPSULE, EXTENDED RELEASE ORAL at 12:41

## 2019-02-17 RX ADMIN — MORPHINE SULFATE 1 MILLIGRAM(S): 50 CAPSULE, EXTENDED RELEASE ORAL at 11:07

## 2019-02-17 NOTE — PROGRESS NOTE ADULT - SUBJECTIVE AND OBJECTIVE BOX
Patient is a 59y old  Female who presents with a chief complaint of Abdominal pain (2019 09:31)      SUBJECTIVE / OVERNIGHT EVENTS:  +abdominal pain. Toradol added to IV morphine to alternate  NPO  no nausea.  no cp, no sob.       Vital Signs Last 24 Hrs  T(C): 36.8 (2019 07:51), Max: 37.1 (2019 16:49)  T(F): 98.3 (2019 07:51), Max: 98.8 (2019 16:49)  HR: 72 (2019 07:51) (60 - 81)  BP: 111/69 (2019 07:51) (99/60 - 114/73)  BP(mean): --  RR: 18 (2019 07:51) (16 - 18)  SpO2: 95% (2019 07:51) (95% - 96%)  I&O's Summary    2019 07:  -  2019 07:00  --------------------------------------------------------  IN: 900 mL / OUT: 0 mL / NET: 900 mL    2019 07:01  -  2019 16:08  --------------------------------------------------------  IN: 100 mL / OUT: 0 mL / NET: 100 mL        PHYSICAL EXAM:  GENERAL: NAD, Comfortable  HEAD:  Atraumatic, Normocephalic  EYES: EOMI, PERRLA, conjunctiva and sclera clear  NECK: Supple, No JVD  CHEST/LUNG: Clear to auscultation bilaterally; No wheeze  HEART: Regular rate and rhythm; No murmurs, rubs, or gallops  ABDOMEN: Soft, +tenderness diffuse, Nondistended; Bowel sounds present  Neuro: AAO x 3, no focal deficit, 5/5 b/l extremities  EXTREMITIES:  2+ Peripheral Pulses, No clubbing, cyanosis, or edema  SKIN: No rashes or lesions    LABS:                        10.1   6.46  )-----------( 233      ( 2019 08:17 )             30.9     -    138  |  102  |  9   ----------------------------<  80  3.2<L>   |  22  |  0.79    Ca    8.2<L>      2019 07:19  Mg     2.1         TPro  7.4  /  Alb  4.4  /  TBili  0.3  /  DBili  x   /  AST  26  /  ALT  21  /  AlkPhos  56  -      CAPILLARY BLOOD GLUCOSE            Urinalysis Basic - ( 2019 16:09 )    Color: Yellow / Appearance: Slightly Turbid / S.028 / pH: x  Gluc: x / Ketone: Large  / Bili: Negative / Urobili: Negative   Blood: x / Protein: 100 / Nitrite: Negative   Leuk Esterase: Moderate / RBC: 5 /hpf / WBC 8 /HPF   Sq Epi: x / Non Sq Epi: 4 /hpf / Bacteria: Negative        RADIOLOGY & ADDITIONAL TESTS:    Imaging Personally Reviewed:  [x] YES  [ ] NO    Consultant(s) Notes Reviewed:  [x] YES  [ ] NO      MEDICATIONS  (STANDING):  anastrozole 1 milliGRAM(s) Oral daily  famotidine    Tablet 20 milliGRAM(s) Oral daily  heparin  Injectable 5000 Unit(s) SubCutaneous every 8 hours  nicotine -  14 mG/24Hr(s) Patch 1 patch Transdermal daily  pantoprazole  Injectable 40 milliGRAM(s) IV Push two times a day  piperacillin/tazobactam IVPB. 3.375 Gram(s) IV Intermittent every 8 hours  sertraline 200 milliGRAM(s) Oral daily  sodium chloride 0.9%. 1000 milliLiter(s) (75 mL/Hr) IV Continuous <Continuous>    MEDICATIONS  (PRN):  acetaminophen   Tablet .. 650 milliGRAM(s) Oral every 6 hours PRN Mild Pain (1 - 3)  aluminum hydroxide/magnesium hydroxide/simethicone Suspension 30 milliLiter(s) Oral every 4 hours PRN Dyspepsia  ketorolac   Injectable 15 milliGRAM(s) IV Push every 8 hours PRN Moderate Pain (4 - 6)  morphine  - Injectable 2 milliGRAM(s) IV Push every 4 hours PRN Severe Pain (7 - 10)  morphine  - Injectable 1 milliGRAM(s) IV Push every 2 hours PRN Breakthrough pain      Care Discussed with Consultants/Other Providers [x] YES  [ ] NO    HEALTH ISSUES - PROBLEM Dx:  GERD (gastroesophageal reflux disease): GERD (gastroesophageal reflux disease)  Tobacco dependence: Tobacco dependence  Anxiety: Anxiety  Sepsis: Sepsis  Pancolitis: Pancolitis

## 2019-02-17 NOTE — PROGRESS NOTE ADULT - ASSESSMENT
58 y/o female with h/o history of breast CA with RIGHT MRM presumed to be in remission, breast implants, moderate tobacco use since age 15, depression who presented for diffuse abdominal pain found to have pancolitis on CT imaging.

## 2019-02-17 NOTE — CONSULT NOTE ADULT - SUBJECTIVE AND OBJECTIVE BOX
Patient is a 59y old  Female who presents with a chief complaint of Abdominal pain (2019 18:04)      HPI:  This is a 60 y/o female with h/o history of breast CA with RIGHT MRM presumed to be in remission, breast implants, moderate tobacco use since age 15, depression who presented for diffuse abdominal pain. Of note, pt was recently admitted to [facility] found to have diverticulitis and was discharged home on Cipro/Flagyl on 2/3 to complete course. She completed the course of abx on  and felt better with no pain and decr diarrhea.  She went to see her outpt GI doc -- Dr Maldonado  who advised her to liberalize her diet. After doing so she began to experience worsening abdominal pain and incr diarrhea. Pt reports multiple episodes of diarrhea daily, decribed as yellowish with lots of mucus. no brb.  Has only been able to keep down liquids. No fever/chills. . N/V but notes intermittent gerd   pt denies travel / reports multiple abx  due to uri   she notes last colon dr maldonado  -- ? polyps       PAST MEDICAL & SURGICAL HISTORY:  Diverticulitis  Anxiety  Hypercholesterolemia  H/O partial mastectomy Right  C Section: 88,91,96  S/P Breast Augmentation:       MEDICATIONS  (STANDING):  anastrozole 1 milliGRAM(s) Oral daily  heparin  Injectable 5000 Unit(s) SubCutaneous every 8 hours  nicotine -  14 mG/24Hr(s) Patch 1 patch Transdermal daily  pantoprazole  Injectable 40 milliGRAM(s) IV Push daily  piperacillin/tazobactam IVPB. 3.375 Gram(s) IV Intermittent every 8 hours  sertraline 200 milliGRAM(s) Oral daily  sodium chloride 0.9%. 1000 milliLiter(s) (75 mL/Hr) IV Continuous <Continuous>    MEDICATIONS  (PRN):  acetaminophen   Tablet .. 650 milliGRAM(s) Oral every 6 hours PRN Mild Pain (1 - 3)  aluminum hydroxide/magnesium hydroxide/simethicone Suspension 30 milliLiter(s) Oral every 4 hours PRN Dyspepsia  ketorolac   Injectable 15 milliGRAM(s) IV Push every 8 hours PRN Moderate Pain (4 - 6)  morphine  - Injectable 2 milliGRAM(s) IV Push every 4 hours PRN Severe Pain (7 - 10)  morphine  - Injectable 1 milliGRAM(s) IV Push every 2 hours PRN Breakthrough pain      Allergies    latex (Unknown)  No Known Drug Allergies    Intolerances        Review of Systems: see HPI    Vital Signs Last 24 Hrs  T(C): 36.8 (2019 07:51), Max: 37.4 (2019 11:54)  T(F): 98.3 (2019 07:51), Max: 99.3 (2019 11:54)  HR: 72 (2019 07:51) (60 - 92)  BP: 111/69 (2019 07:51) (99/60 - 119/75)  BP(mean): --  RR: 18 (2019 07:51) (16 - 18)  SpO2: 95% (2019 07:51) (95% - 97%)    PHYSICAL EXAM:    Constitutional: NAD, well-developed  Neck: No LAD, supple  Respiratory: CTA and P  Cardiovascular: S1 and S2, RRR,   Gastrointestinal: BS+, soft / diffuse tender   Extremities: No peripheral edema  Neurological: A/O x 3, grossly normal  Psychiatric: Normal mood, normal affect    LABS:                        10.1   6.46  )-----------( 233      ( 2019 08:17 )             30.9     02-17    138  |  102  |  9   ----------------------------<  80  3.2<L>   |  22  |  0.79    Ca    8.2<L>      2019 07:19  Mg     2.1     -17    TPro  7.4  /  Alb  4.4  /  TBili  0.3  /  DBili  x   /  AST  26  /  ALT  21  /  AlkPhos  56  02-16      Urinalysis Basic - ( 2019 16:09 )    Color: Yellow / Appearance: Slightly Turbid / S.028 / pH: x  Gluc: x / Ketone: Large  / Bili: Negative / Urobili: Negative   Blood: x / Protein: 100 / Nitrite: Negative   Leuk Esterase: Moderate / RBC: 5 /hpf / WBC 8 /HPF   Sq Epi: x / Non Sq Epi: 4 /hpf / Bacteria: Negative      LIVER FUNCTIONS - ( 2019 10:59 )  Alb: 4.4 g/dL / Pro: 7.4 g/dL / ALK PHOS: 56 U/L / ALT: 21 U/L / AST: 26 U/L / GGT: x             RADIOLOGY & ADDITIONAL TESTS:

## 2019-02-17 NOTE — PROGRESS NOTE ADULT - PROBLEM SELECTOR PLAN 1
Pt presented with diffuse abdominal pain a/w multiple episodes of diarrhea. CT abdomen with pancolitis, worse in the sigmoid colon. ddx includes infectious vs inflammatory etiology.  - Given sepsis criteria on admission   - will c/w Zosyn IV  - check stool studies. check bcx  - GI consult  - NPO except med's and ice chips for now  - IVF hydration while NPO  - colorectal surgery consult (Pt called Dr. Graves) Pt presented with diffuse abdominal pain a/w multiple episodes of diarrhea. CT abdomen with pancolitis, worse in the sigmoid colon. ddx includes infectious vs inflammatory etiology.  - Given sepsis criteria on admission   - will c/w Zosyn IV  - check stool studies. check bcx  - GI consult  - NPO except med's and ice chips for now  - IVF hydration while NPO  - colorectal surgery consult (Pt called Dr. Graves)  - c.diff PCR indeterminate. (last time it was neg).  started empiric vanco PO per Gi.

## 2019-02-17 NOTE — CONSULT NOTE ADULT - ASSESSMENT
pancolitis on ct   prob infectious (improved with abx last adm)  r/o c diff (multiple abx since 12/18)  doubt ischemic / ibd

## 2019-02-17 NOTE — CONSULT NOTE ADULT - PROBLEM SELECTOR RECOMMENDATION 9
npo  cont iv abx   toradol for pain (hold morphine)  ppi  await stools   possible flex sig pending above / clinical course

## 2019-02-18 LAB
ANION GAP SERPL CALC-SCNC: 16 MMOL/L — SIGNIFICANT CHANGE UP (ref 5–17)
BUN SERPL-MCNC: 9 MG/DL — SIGNIFICANT CHANGE UP (ref 7–23)
CALCIUM SERPL-MCNC: 8.6 MG/DL — SIGNIFICANT CHANGE UP (ref 8.4–10.5)
CHLORIDE SERPL-SCNC: 102 MMOL/L — SIGNIFICANT CHANGE UP (ref 96–108)
CO2 SERPL-SCNC: 21 MMOL/L — LOW (ref 22–31)
CREAT SERPL-MCNC: 0.72 MG/DL — SIGNIFICANT CHANGE UP (ref 0.5–1.3)
GLUCOSE SERPL-MCNC: 68 MG/DL — LOW (ref 70–99)
HCT VFR BLD CALC: 32.4 % — LOW (ref 34.5–45)
HGB BLD-MCNC: 11 G/DL — LOW (ref 11.5–15.5)
MCHC RBC-ENTMCNC: 31.6 PG — SIGNIFICANT CHANGE UP (ref 27–34)
MCHC RBC-ENTMCNC: 34 GM/DL — SIGNIFICANT CHANGE UP (ref 32–36)
MCV RBC AUTO: 93.1 FL — SIGNIFICANT CHANGE UP (ref 80–100)
PLATELET # BLD AUTO: 217 K/UL — SIGNIFICANT CHANGE UP (ref 150–400)
POTASSIUM SERPL-MCNC: 3.6 MMOL/L — SIGNIFICANT CHANGE UP (ref 3.5–5.3)
POTASSIUM SERPL-SCNC: 3.6 MMOL/L — SIGNIFICANT CHANGE UP (ref 3.5–5.3)
RBC # BLD: 3.48 M/UL — LOW (ref 3.8–5.2)
RBC # FLD: 12.6 % — SIGNIFICANT CHANGE UP (ref 10.3–14.5)
SODIUM SERPL-SCNC: 139 MMOL/L — SIGNIFICANT CHANGE UP (ref 135–145)
WBC # BLD: 6.21 K/UL — SIGNIFICANT CHANGE UP (ref 3.8–10.5)
WBC # FLD AUTO: 6.21 K/UL — SIGNIFICANT CHANGE UP (ref 3.8–10.5)

## 2019-02-18 RX ORDER — ALPRAZOLAM 0.25 MG
0.5 TABLET ORAL ONCE
Qty: 0 | Refills: 0 | Status: DISCONTINUED | OUTPATIENT
Start: 2019-02-18 | End: 2019-02-18

## 2019-02-18 RX ADMIN — Medication 1 PATCH: at 07:30

## 2019-02-18 RX ADMIN — ANASTROZOLE 1 MILLIGRAM(S): 1 TABLET ORAL at 12:40

## 2019-02-18 RX ADMIN — MORPHINE SULFATE 1 MILLIGRAM(S): 50 CAPSULE, EXTENDED RELEASE ORAL at 19:00

## 2019-02-18 RX ADMIN — Medication 1 PATCH: at 16:58

## 2019-02-18 RX ADMIN — PANTOPRAZOLE SODIUM 40 MILLIGRAM(S): 20 TABLET, DELAYED RELEASE ORAL at 05:14

## 2019-02-18 RX ADMIN — MORPHINE SULFATE 2 MILLIGRAM(S): 50 CAPSULE, EXTENDED RELEASE ORAL at 23:13

## 2019-02-18 RX ADMIN — Medication 0.5 MILLIGRAM(S): at 21:49

## 2019-02-18 RX ADMIN — Medication 125 MILLIGRAM(S): at 12:39

## 2019-02-18 RX ADMIN — Medication 650 MILLIGRAM(S): at 11:32

## 2019-02-18 RX ADMIN — HEPARIN SODIUM 5000 UNIT(S): 5000 INJECTION INTRAVENOUS; SUBCUTANEOUS at 12:40

## 2019-02-18 RX ADMIN — Medication 30 MILLILITER(S): at 23:14

## 2019-02-18 RX ADMIN — HEPARIN SODIUM 5000 UNIT(S): 5000 INJECTION INTRAVENOUS; SUBCUTANEOUS at 05:14

## 2019-02-18 RX ADMIN — Medication 125 MILLIGRAM(S): at 17:00

## 2019-02-18 RX ADMIN — Medication 30 MILLILITER(S): at 08:49

## 2019-02-18 RX ADMIN — MORPHINE SULFATE 2 MILLIGRAM(S): 50 CAPSULE, EXTENDED RELEASE ORAL at 17:20

## 2019-02-18 RX ADMIN — PIPERACILLIN AND TAZOBACTAM 25 GRAM(S): 4; .5 INJECTION, POWDER, LYOPHILIZED, FOR SOLUTION INTRAVENOUS at 05:23

## 2019-02-18 RX ADMIN — PANTOPRAZOLE SODIUM 40 MILLIGRAM(S): 20 TABLET, DELAYED RELEASE ORAL at 17:00

## 2019-02-18 RX ADMIN — Medication 125 MILLIGRAM(S): at 05:23

## 2019-02-18 RX ADMIN — MORPHINE SULFATE 1 MILLIGRAM(S): 50 CAPSULE, EXTENDED RELEASE ORAL at 18:45

## 2019-02-18 RX ADMIN — Medication 1 PATCH: at 19:00

## 2019-02-18 RX ADMIN — Medication 125 MILLIGRAM(S): at 23:13

## 2019-02-18 RX ADMIN — MORPHINE SULFATE 2 MILLIGRAM(S): 50 CAPSULE, EXTENDED RELEASE ORAL at 17:00

## 2019-02-18 RX ADMIN — Medication 650 MILLIGRAM(S): at 12:30

## 2019-02-18 RX ADMIN — Medication 30 MILLILITER(S): at 15:18

## 2019-02-18 RX ADMIN — SERTRALINE 200 MILLIGRAM(S): 25 TABLET, FILM COATED ORAL at 11:33

## 2019-02-18 RX ADMIN — Medication 650 MILLIGRAM(S): at 05:14

## 2019-02-18 RX ADMIN — MORPHINE SULFATE 2 MILLIGRAM(S): 50 CAPSULE, EXTENDED RELEASE ORAL at 23:30

## 2019-02-18 RX ADMIN — FAMOTIDINE 20 MILLIGRAM(S): 10 INJECTION INTRAVENOUS at 11:33

## 2019-02-18 RX ADMIN — Medication 1 PATCH: at 17:01

## 2019-02-18 NOTE — PROGRESS NOTE ADULT - PROBLEM SELECTOR PLAN 1
Pt presented with diffuse abdominal pain a/w multiple episodes of diarrhea. CT abdomen with pancolitis, worse in the sigmoid colon.   - Given sepsis criteria on admission started on Zosyn IV  - c.diff PCR indeterminate initially, now positive. (last time it was neg).  - started empiric vanco PO with improved symptoms.  - will start clear liquid diet. c/w gentle IVF hydration

## 2019-02-18 NOTE — PROGRESS NOTE ADULT - ASSESSMENT
pancolitis on ct, now with positive C Diff.    Started on PO Vancomycin and improving.  Would stop other antibiotics.  Low res diet.    will follow.  [phone]

## 2019-02-18 NOTE — PROGRESS NOTE ADULT - SUBJECTIVE AND OBJECTIVE BOX
Found to be C Diff positive. Started on PO vancomycin.  Stool becoming less frequent and more formed this AM.      PAST MEDICAL & SURGICAL HISTORY:  Diverticulitis  Anxiety  Hypercholesterolemia  H/O partial mastectomy Right  C Section: 88,91,96  S/P Breast Augmentation:       MEDICATIONS  (STANDING):  anastrozole 1 milliGRAM(s) Oral daily  heparin  Injectable 5000 Unit(s) SubCutaneous every 8 hours  nicotine -  14 mG/24Hr(s) Patch 1 patch Transdermal daily  pantoprazole  Injectable 40 milliGRAM(s) IV Push daily  piperacillin/tazobactam IVPB. 3.375 Gram(s) IV Intermittent every 8 hours  sertraline 200 milliGRAM(s) Oral daily  sodium chloride 0.9%. 1000 milliLiter(s) (75 mL/Hr) IV Continuous <Continuous>    MEDICATIONS  (PRN):  acetaminophen   Tablet .. 650 milliGRAM(s) Oral every 6 hours PRN Mild Pain (1 - 3)  aluminum hydroxide/magnesium hydroxide/simethicone Suspension 30 milliLiter(s) Oral every 4 hours PRN Dyspepsia  ketorolac   Injectable 15 milliGRAM(s) IV Push every 8 hours PRN Moderate Pain (4 - 6)  morphine  - Injectable 2 milliGRAM(s) IV Push every 4 hours PRN Severe Pain (7 - 10)  morphine  - Injectable 1 milliGRAM(s) IV Push every 2 hours PRN Breakthrough pain      Allergies    latex (Unknown)  No Known Drug Allergies    Intolerances        Review of Systems: see HPI    Vital Signs Last 24 Hrs  T(C): 36.8 (2019 21:46), Max: 36.8 (2019 16:36)  T(F): 98.3 (2019 21:46), Max: 98.3 (2019 21:46)  HR: 73 (2019 21:46) (73 - 73)  BP: 130/74 (2019 21:46) (108/69 - 130/74)  BP(mean): --  RR: 18 (2019 21:46) (18 - 18)  SpO2: 97% (2019 21:46) (96% - 97%)    PHYSICAL EXAM:    Constitutional: NAD, well-developed  Neck: No LAD, supple  Respiratory: CTA and P  Cardiovascular: S1 and S2, RRR,   Gastrointestinal: BS+, soft / diffuse tender   Extremities: No peripheral edema  Neurological: A/O x 3, grossly normal  Psychiatric: Normal mood, normal affect      LABS:                        10.1   6.46  )-----------( 233      ( 2019 08:17 )             30.9     02-18    139  |  102  |  9   ----------------------------<  68<L>  3.6   |  21<L>  |  0.72    Ca    8.6      2019 07:06  Mg     2.1         TPro  7.4  /  Alb  4.4  /  TBili  0.3  /  DBili  x   /  AST  26  /  ALT  21  /  AlkPhos  56      LIVER FUNCTIONS - ( 2019 10:59 )  Alb: 4.4 g/dL / Pro: 7.4 g/dL / ALK PHOS: 56 U/L / ALT: 21 U/L / AST: 26 U/L / GGT: x             Urinalysis Basic - ( 2019 16:09 )    Color: Yellow / Appearance: Slightly Turbid / S.028 / pH: x  Gluc: x / Ketone: Large  / Bili: Negative / Urobili: Negative   Blood: x / Protein: 100 / Nitrite: Negative   Leuk Esterase: Moderate / RBC: 5 /hpf / WBC 8 /HPF   Sq Epi: x / Non Sq Epi: 4 /hpf / Bacteria: Negative          C Diff by PCR Result: Detected (19 @ 13:45)      RADIOLOGY & ADDITIONAL TESTS:

## 2019-02-18 NOTE — PROVIDER CONTACT NOTE (CRITICAL VALUE NOTIFICATION) - ACTION/TREATMENT ORDERED:
NP made aware. results evalutaed. contiue with isolation precautions and c/w abx. will cont to monitor.

## 2019-02-18 NOTE — PROGRESS NOTE ADULT - ASSESSMENT
60 y/o female with h/o history of breast CA with RIGHT MRM presumed to be in remission, breast implants, moderate tobacco use since age 15, depression who presented for diffuse abdominal pain found to have pancolitis on CT imaging.

## 2019-02-19 ENCOUNTER — TRANSCRIPTION ENCOUNTER (OUTPATIENT)
Age: 59
End: 2019-02-19

## 2019-02-19 RX ORDER — ALPRAZOLAM 0.25 MG
0.5 TABLET ORAL ONCE
Qty: 0 | Refills: 0 | Status: DISCONTINUED | OUTPATIENT
Start: 2019-02-19 | End: 2019-02-19

## 2019-02-19 RX ADMIN — SERTRALINE 200 MILLIGRAM(S): 25 TABLET, FILM COATED ORAL at 11:52

## 2019-02-19 RX ADMIN — Medication 125 MILLIGRAM(S): at 18:09

## 2019-02-19 RX ADMIN — Medication 30 MILLILITER(S): at 11:51

## 2019-02-19 RX ADMIN — MORPHINE SULFATE 1 MILLIGRAM(S): 50 CAPSULE, EXTENDED RELEASE ORAL at 14:05

## 2019-02-19 RX ADMIN — PANTOPRAZOLE SODIUM 40 MILLIGRAM(S): 20 TABLET, DELAYED RELEASE ORAL at 05:39

## 2019-02-19 RX ADMIN — MORPHINE SULFATE 1 MILLIGRAM(S): 50 CAPSULE, EXTENDED RELEASE ORAL at 18:21

## 2019-02-19 RX ADMIN — Medication 125 MILLIGRAM(S): at 05:39

## 2019-02-19 RX ADMIN — MORPHINE SULFATE 1 MILLIGRAM(S): 50 CAPSULE, EXTENDED RELEASE ORAL at 14:20

## 2019-02-19 RX ADMIN — Medication 0.5 MILLIGRAM(S): at 23:21

## 2019-02-19 RX ADMIN — MORPHINE SULFATE 1 MILLIGRAM(S): 50 CAPSULE, EXTENDED RELEASE ORAL at 00:12

## 2019-02-19 RX ADMIN — MORPHINE SULFATE 2 MILLIGRAM(S): 50 CAPSULE, EXTENDED RELEASE ORAL at 16:19

## 2019-02-19 RX ADMIN — ANASTROZOLE 1 MILLIGRAM(S): 1 TABLET ORAL at 11:52

## 2019-02-19 RX ADMIN — MORPHINE SULFATE 2 MILLIGRAM(S): 50 CAPSULE, EXTENDED RELEASE ORAL at 03:30

## 2019-02-19 RX ADMIN — MORPHINE SULFATE 2 MILLIGRAM(S): 50 CAPSULE, EXTENDED RELEASE ORAL at 10:57

## 2019-02-19 RX ADMIN — MORPHINE SULFATE 2 MILLIGRAM(S): 50 CAPSULE, EXTENDED RELEASE ORAL at 16:34

## 2019-02-19 RX ADMIN — MORPHINE SULFATE 1 MILLIGRAM(S): 50 CAPSULE, EXTENDED RELEASE ORAL at 09:02

## 2019-02-19 RX ADMIN — SODIUM CHLORIDE 75 MILLILITER(S): 9 INJECTION INTRAMUSCULAR; INTRAVENOUS; SUBCUTANEOUS at 18:16

## 2019-02-19 RX ADMIN — MORPHINE SULFATE 1 MILLIGRAM(S): 50 CAPSULE, EXTENDED RELEASE ORAL at 21:29

## 2019-02-19 RX ADMIN — Medication 30 MILLILITER(S): at 14:11

## 2019-02-19 RX ADMIN — MORPHINE SULFATE 1 MILLIGRAM(S): 50 CAPSULE, EXTENDED RELEASE ORAL at 05:39

## 2019-02-19 RX ADMIN — MORPHINE SULFATE 1 MILLIGRAM(S): 50 CAPSULE, EXTENDED RELEASE ORAL at 00:30

## 2019-02-19 RX ADMIN — Medication 1 PATCH: at 20:07

## 2019-02-19 RX ADMIN — Medication 1 PATCH: at 18:10

## 2019-02-19 RX ADMIN — MORPHINE SULFATE 1 MILLIGRAM(S): 50 CAPSULE, EXTENDED RELEASE ORAL at 08:47

## 2019-02-19 RX ADMIN — Medication 30 MILLILITER(S): at 22:44

## 2019-02-19 RX ADMIN — MORPHINE SULFATE 2 MILLIGRAM(S): 50 CAPSULE, EXTENDED RELEASE ORAL at 20:07

## 2019-02-19 RX ADMIN — MORPHINE SULFATE 2 MILLIGRAM(S): 50 CAPSULE, EXTENDED RELEASE ORAL at 06:56

## 2019-02-19 RX ADMIN — MORPHINE SULFATE 1 MILLIGRAM(S): 50 CAPSULE, EXTENDED RELEASE ORAL at 11:52

## 2019-02-19 RX ADMIN — Medication 1 PATCH: at 18:11

## 2019-02-19 RX ADMIN — MORPHINE SULFATE 1 MILLIGRAM(S): 50 CAPSULE, EXTENDED RELEASE ORAL at 12:07

## 2019-02-19 RX ADMIN — MORPHINE SULFATE 2 MILLIGRAM(S): 50 CAPSULE, EXTENDED RELEASE ORAL at 07:15

## 2019-02-19 RX ADMIN — MORPHINE SULFATE 1 MILLIGRAM(S): 50 CAPSULE, EXTENDED RELEASE ORAL at 18:06

## 2019-02-19 RX ADMIN — PANTOPRAZOLE SODIUM 40 MILLIGRAM(S): 20 TABLET, DELAYED RELEASE ORAL at 18:09

## 2019-02-19 RX ADMIN — Medication 125 MILLIGRAM(S): at 22:44

## 2019-02-19 RX ADMIN — Medication 1 PATCH: at 07:43

## 2019-02-19 RX ADMIN — FAMOTIDINE 20 MILLIGRAM(S): 10 INJECTION INTRAVENOUS at 11:52

## 2019-02-19 RX ADMIN — MORPHINE SULFATE 2 MILLIGRAM(S): 50 CAPSULE, EXTENDED RELEASE ORAL at 10:42

## 2019-02-19 RX ADMIN — Medication 125 MILLIGRAM(S): at 11:52

## 2019-02-19 RX ADMIN — MORPHINE SULFATE 1 MILLIGRAM(S): 50 CAPSULE, EXTENDED RELEASE ORAL at 06:00

## 2019-02-19 RX ADMIN — MORPHINE SULFATE 2 MILLIGRAM(S): 50 CAPSULE, EXTENDED RELEASE ORAL at 03:15

## 2019-02-19 RX ADMIN — HEPARIN SODIUM 5000 UNIT(S): 5000 INJECTION INTRAVENOUS; SUBCUTANEOUS at 05:39

## 2019-02-19 NOTE — PROGRESS NOTE ADULT - PROBLEM SELECTOR PLAN 1
Pt presented with diffuse abdominal pain a/w multiple episodes of diarrhea. CT abdomen with pancolitis, worse in the sigmoid colon.   - Given sepsis criteria on admission started on Zosyn IV initially but discontinued after c.diff diagnosis.   - c.diff PCR indeterminate initially, now positive. (last time it was neg).  - started empiric vanco PO with some improved symptoms. not ready to go home yet.   - c/w gentle IVF hydration. advanced to low fiber diet.   - monitor for clinical improvement

## 2019-02-19 NOTE — DISCHARGE NOTE ADULT - PATIENT PORTAL LINK FT
You can access the [facility], offered by [facility], by registering with the following website: [email or web address]

## 2019-02-19 NOTE — PROGRESS NOTE ADULT - SUBJECTIVE AND OBJECTIVE BOX
Found to be C Diff positive.   Clinically improving on oral vancomycin.  C/O low back pain that improved with passing gas.  Stool becoming less frequent and more formed this AM.      PAST MEDICAL & SURGICAL HISTORY:  Diverticulitis  Anxiety  Hypercholesterolemia  H/O partial mastectomy Right  C Section: 88,91,96  S/P Breast Augmentation: 1999      MEDICATIONS  (STANDING):  anastrozole 1 milliGRAM(s) Oral daily  heparin  Injectable 5000 Unit(s) SubCutaneous every 8 hours  nicotine -  14 mG/24Hr(s) Patch 1 patch Transdermal daily  pantoprazole  Injectable 40 milliGRAM(s) IV Push daily  piperacillin/tazobactam IVPB. 3.375 Gram(s) IV Intermittent every 8 hours  sertraline 200 milliGRAM(s) Oral daily  sodium chloride 0.9%. 1000 milliLiter(s) (75 mL/Hr) IV Continuous <Continuous>    MEDICATIONS  (PRN):  acetaminophen   Tablet .. 650 milliGRAM(s) Oral every 6 hours PRN Mild Pain (1 - 3)  aluminum hydroxide/magnesium hydroxide/simethicone Suspension 30 milliLiter(s) Oral every 4 hours PRN Dyspepsia  ketorolac   Injectable 15 milliGRAM(s) IV Push every 8 hours PRN Moderate Pain (4 - 6)  morphine  - Injectable 2 milliGRAM(s) IV Push every 4 hours PRN Severe Pain (7 - 10)  morphine  - Injectable 1 milliGRAM(s) IV Push every 2 hours PRN Breakthrough pain      Allergies    latex (Unknown)  No Known Drug Allergies    Intolerances        Review of Systems: see HPI    Vital Signs Last 24 Hrs  T(C): 36.8 (19 Feb 2019 07:59), Max: 36.8 (18 Feb 2019 21:23)  T(F): 98.3 (19 Feb 2019 07:59), Max: 98.3 (18 Feb 2019 21:23)  HR: 65 (19 Feb 2019 07:59) (65 - 69)  BP: 142/83 (19 Feb 2019 07:59) (135/78 - 148/78)  BP(mean): --  RR: 18 (19 Feb 2019 07:59) (18 - 20)  SpO2: 97% (19 Feb 2019 07:59) (97% - 98%)    PHYSICAL EXAM:    Constitutional: NAD, well-developed  Neck: No LAD, supple  Respiratory: CTA and P  Cardiovascular: S1 and S2, RRR,   Gastrointestinal: BS+, soft / diffuse tender   Extremities: No peripheral edema  Neurological: A/O x 3, grossly normal  Psychiatric: Normal mood, normal affect      LABS:                        11.0   6.21  )-----------( 217      ( 18 Feb 2019 09:50 )             32.4     02-18    139  |  102  |  9   ----------------------------<  68<L>  3.6   |  21<L>  |  0.72    Ca    8.6      18 Feb 2019 07:06                            C Diff by PCR Result: Detected (02.17.19 @ 13:45)      RADIOLOGY & ADDITIONAL TESTS:

## 2019-02-19 NOTE — DISCHARGE NOTE ADULT - PLAN OF CARE
Resolution Resolved. With positive Cdiff in stool. Now improved. Continue vanco po Continue current medications resolution With positive Cdiff in stool. Now improved. Continue vanco po until completed and follow up with your physician and GI

## 2019-02-19 NOTE — DISCHARGE NOTE ADULT - MEDICATION SUMMARY - MEDICATIONS TO TAKE
I will START or STAY ON the medications listed below when I get home from the hospital:    acetaminophen 325 mg oral tablet  -- 2 tab(s) by mouth every 6 hours, As needed, Mild Pain (1 - 3)  -- Indication: For Pain    oxycodone-acetaminophen 5 mg-325 mg oral tablet  -- 1 tab(s) by mouth every 4 hours, As needed, Moderate Pain (4 - 6) MDD:6 tab  -- Indication: For Pain    aluminum hydroxide-magnesium hydroxide 200 mg-200 mg/5 mL oral suspension  -- 30 milliliter(s) by mouth every 4 hours, As needed, Dyspepsia  -- Indication: For Dyspepsia    Zoloft 100 mg oral tablet  -- 2 tab(s) by mouth once a day  -- Indication: For mood    Arimidex 1 mg oral tablet  -- 1 tab(s) by mouth once a day   -- Indication: For breast cancer    Xanax 0.5 mg oral tablet  -- 1 tab(s) by mouth once a day    NOTE: Pharmacy RX says to take 3 times daily as needed.  -- Indication: For Anxiety    Reclast 5 mg/100 mL intravenous solution  --  intravenous yearly    NOTE: Gets injection from MD office yearly. NOT RX  -- Indication: For op    vancomycin 250 mg/5 mL oral solution  -- 125 milligram(s) by mouth 4 times a day  x 9 days  -- Indication: For c diff    famotidine 20 mg oral tablet  -- 1 tab(s) by mouth once a day  -- Indication: For Dyspepsia    Probiotic Formula oral capsule  -- 1 cap(s) by mouth once a day  -- Indication: For Ppx    nicotine 14 mg/24 hr transdermal film, extended release  -- 1 patch by transdermal patch once a day   -- Indication: For Smoker

## 2019-02-19 NOTE — DISCHARGE NOTE ADULT - CARE PLAN
Principal Discharge DX:	Sepsis  Goal:	Resolution  Assessment and plan of treatment:	Resolved.  Secondary Diagnosis:	Pancolitis  Assessment and plan of treatment:	With positive Cdiff in stool. Now improved. Continue vanco po  Secondary Diagnosis:	Anxiety  Assessment and plan of treatment:	Continue current medications Principal Discharge DX:	Sepsis  Goal:	Resolution  Assessment and plan of treatment:	Resolved.  Secondary Diagnosis:	Pancolitis  Goal:	resolution  Assessment and plan of treatment:	With positive Cdiff in stool. Now improved. Continue vanco po until completed and follow up with your physician and GI  Secondary Diagnosis:	Anxiety  Assessment and plan of treatment:	Continue current medications

## 2019-02-19 NOTE — DISCHARGE NOTE ADULT - HOSPITAL COURSE
58 y/o female with h/o history of breast CA with RIGHT MRM presumed to be in remission, breast implants, moderate tobacco use since age 15, depression who presented for diffuse abdominal pain found to have pancolitis on CT imaging, worse in the sigmoid colon. Given sepsis criteria on admission started on Zosyn IV initially but discontinued after C diff diagnosis  GI was on board. Started on po vanco with improvement. 58 y/o female with h/o history of breast CA with RIGHT MRM presumed to be in remission, breast implants, moderate tobacco use since age 15, depression who presented for diffuse abdominal pain found to have pancolitis on CT imaging, worse in the sigmoid colon. Given sepsis criteria on admission started on Zosyn IV initially but discontinued after C diff diagnosis  GI was on board. Started on po vanco with improvement.   Discharge home 60 y/o female with h/o history of breast CA with RIGHT MRM presumed to be in remission, breast implants, moderate tobacco use since age 15, depression who presented for diffuse abdominal pain found to have pancolitis on CT imaging, worse in the sigmoid colon. Given sepsis criteria on admission started on Zosyn IV initially but discontinued after C diff diagnosis.  GI was on board. Started on po vanco with improvement.   Discharge home.    Attending Addendum:   Patient seen and examined by me on the discharge day. Please see my progress note from today.  c.diff precautions explained. d/w pt, pt's RN and NP. d/w GI.   More than 30 mins were spent evaluating patient and coordinating care for discharge.   All questions answered in details. Follow up plan explained.

## 2019-02-19 NOTE — PROGRESS NOTE ADULT - ATTENDING COMMENTS
Robbin Mcwilliams will be covering for me starting 2/20/19. He can be reached at  if needed.     - Dr. BRIAN Kearns ([facility])  - [phone]

## 2019-02-19 NOTE — PROGRESS NOTE ADULT - ASSESSMENT
Pancolitis on ct, now with positive C Diff.    Started on PO Vancomycin 125 mg 4x daily and improving. Continue for 14 days.  Low res diet.    will follow.  [phone]

## 2019-02-19 NOTE — DISCHARGE NOTE ADULT - CARE PROVIDER_API CALL
Dilshad Lim)  Internal Medicine  [address] [place] [address]  Phone: [phone]  Fax: [phone]  Follow Up Time:     Prashant Wolfe)  Cardiology; Internal Medicine  [address] [place] [address]  Phone: [phone]  Fax: [phone]  Follow Up Time:

## 2019-02-20 DIAGNOSIS — A09 INFECTIOUS GASTROENTERITIS AND COLITIS, UNSPECIFIED: ICD-10-CM

## 2019-02-20 RX ORDER — ALPRAZOLAM 0.25 MG
0.5 TABLET ORAL
Qty: 0 | Refills: 0 | Status: DISCONTINUED | OUTPATIENT
Start: 2019-02-20 | End: 2019-02-22

## 2019-02-20 RX ORDER — OXYCODONE AND ACETAMINOPHEN 5; 325 MG/1; MG/1
1 TABLET ORAL EVERY 4 HOURS
Qty: 0 | Refills: 0 | Status: DISCONTINUED | OUTPATIENT
Start: 2019-02-20 | End: 2019-02-22

## 2019-02-20 RX ADMIN — OXYCODONE AND ACETAMINOPHEN 1 TABLET(S): 5; 325 TABLET ORAL at 12:46

## 2019-02-20 RX ADMIN — SODIUM CHLORIDE 75 MILLILITER(S): 9 INJECTION INTRAMUSCULAR; INTRAVENOUS; SUBCUTANEOUS at 20:44

## 2019-02-20 RX ADMIN — OXYCODONE AND ACETAMINOPHEN 1 TABLET(S): 5; 325 TABLET ORAL at 17:40

## 2019-02-20 RX ADMIN — Medication 1 PATCH: at 18:30

## 2019-02-20 RX ADMIN — Medication 125 MILLIGRAM(S): at 06:08

## 2019-02-20 RX ADMIN — OXYCODONE AND ACETAMINOPHEN 1 TABLET(S): 5; 325 TABLET ORAL at 16:40

## 2019-02-20 RX ADMIN — FAMOTIDINE 20 MILLIGRAM(S): 10 INJECTION INTRAVENOUS at 12:41

## 2019-02-20 RX ADMIN — Medication 1 PATCH: at 10:50

## 2019-02-20 RX ADMIN — Medication 1 PATCH: at 18:38

## 2019-02-20 RX ADMIN — SERTRALINE 200 MILLIGRAM(S): 25 TABLET, FILM COATED ORAL at 12:46

## 2019-02-20 RX ADMIN — Medication 125 MILLIGRAM(S): at 18:32

## 2019-02-20 RX ADMIN — Medication 1 PATCH: at 22:23

## 2019-02-20 RX ADMIN — OXYCODONE AND ACETAMINOPHEN 1 TABLET(S): 5; 325 TABLET ORAL at 20:44

## 2019-02-20 RX ADMIN — OXYCODONE AND ACETAMINOPHEN 1 TABLET(S): 5; 325 TABLET ORAL at 22:25

## 2019-02-20 RX ADMIN — HEPARIN SODIUM 5000 UNIT(S): 5000 INJECTION INTRAVENOUS; SUBCUTANEOUS at 06:09

## 2019-02-20 RX ADMIN — PANTOPRAZOLE SODIUM 40 MILLIGRAM(S): 20 TABLET, DELAYED RELEASE ORAL at 18:32

## 2019-02-20 RX ADMIN — Medication 30 MILLILITER(S): at 23:30

## 2019-02-20 RX ADMIN — HEPARIN SODIUM 5000 UNIT(S): 5000 INJECTION INTRAVENOUS; SUBCUTANEOUS at 22:27

## 2019-02-20 RX ADMIN — OXYCODONE AND ACETAMINOPHEN 1 TABLET(S): 5; 325 TABLET ORAL at 13:45

## 2019-02-20 RX ADMIN — ANASTROZOLE 1 MILLIGRAM(S): 1 TABLET ORAL at 12:42

## 2019-02-20 RX ADMIN — Medication 125 MILLIGRAM(S): at 12:41

## 2019-02-20 RX ADMIN — OXYCODONE AND ACETAMINOPHEN 1 TABLET(S): 5; 325 TABLET ORAL at 08:32

## 2019-02-20 RX ADMIN — Medication 30 MILLILITER(S): at 06:14

## 2019-02-20 RX ADMIN — OXYCODONE AND ACETAMINOPHEN 1 TABLET(S): 5; 325 TABLET ORAL at 09:30

## 2019-02-20 RX ADMIN — PANTOPRAZOLE SODIUM 40 MILLIGRAM(S): 20 TABLET, DELAYED RELEASE ORAL at 06:08

## 2019-02-20 NOTE — PROGRESS NOTE ADULT - SUBJECTIVE AND OBJECTIVE BOX
Patient is a 59y old  Female who presents with a chief complaint of Abdominal pain (19 Feb 2019 16:11)      SUBJECTIVE / OVERNIGHT EVENTS:  gas pain bothering. Maalox helps.  stool a little better.  brown color now. still loose. a few episode.  no cp, no sob. no n/v/d.  eating small meal.       Vital Signs Last 24 Hrs  T(C): 36.8 (19 Feb 2019 22:41), Max: 36.8 (19 Feb 2019 22:41)  T(F): 98.2 (19 Feb 2019 22:41), Max: 98.2 (19 Feb 2019 22:41)  HR: 67 (19 Feb 2019 22:41) (67 - 69)  BP: 158/86 (19 Feb 2019 22:41) (123/76 - 158/86)  BP(mean): --  RR: 18 (19 Feb 2019 22:41) (18 - 18)  SpO2: 97% (19 Feb 2019 22:41) (97% - 98%)  I&O's Summary    19 Feb 2019 07:01  -  20 Feb 2019 07:00  --------------------------------------------------------  IN: 1310 mL / OUT: 0 mL / NET: 1310 mL      PHYSICAL EXAM:  GENERAL: NAD, Comfortable  HEAD:  Atraumatic, Normocephalic  EYES: EOMI, PERRLA, conjunctiva and sclera clear  NECK: Supple, No JVD  CHEST/LUNG: Clear to auscultation bilaterally; No wheeze  HEART: Regular rate and rhythm; No murmurs, rubs, or gallops  ABDOMEN: Soft, +tenderness diffuse, improved, Nondistended; Bowel sounds present  Neuro: AAO x 3, no focal deficit, 5/5 b/l extremities  EXTREMITIES:  2+ Peripheral Pulses, No clubbing, cyanosis, or edema  SKIN: No rashes or lesions      LABS:                        11.0   6.21  )-----------( 217      ( 18 Feb 2019 09:50 )             32.4             CAPILLARY BLOOD GLUCOSE                RADIOLOGY & ADDITIONAL TESTS:    Imaging Personally Reviewed:  [x] YES  [ ] NO    Consultant(s) Notes Reviewed:  [x] YES  [ ] NO      MEDICATIONS  (STANDING):  aluminum hydroxide/magnesium hydroxide/simethicone Suspension 30 milliLiter(s) Oral once  anastrozole 1 milliGRAM(s) Oral daily  famotidine    Tablet 20 milliGRAM(s) Oral daily  heparin  Injectable 5000 Unit(s) SubCutaneous every 8 hours  nicotine -  14 mG/24Hr(s) Patch 1 patch Transdermal daily  pantoprazole  Injectable 40 milliGRAM(s) IV Push two times a day  sertraline 200 milliGRAM(s) Oral daily  sodium chloride 0.9%. 1000 milliLiter(s) (75 mL/Hr) IV Continuous <Continuous>  vancomycin    Solution 125 milliGRAM(s) Oral every 6 hours    MEDICATIONS  (PRN):  ALPRAZolam 0.5 milliGRAM(s) Oral two times a day PRN anxiety  aluminum hydroxide/magnesium hydroxide/simethicone Suspension 30 milliLiter(s) Oral every 6 hours PRN Dyspepsia  aluminum hydroxide/magnesium hydroxide/simethicone Suspension 30 milliLiter(s) Oral every 4 hours PRN Dyspepsia  ketorolac   Injectable 15 milliGRAM(s) IV Push every 8 hours PRN Moderate Pain (4 - 6)  morphine  - Injectable 2 milliGRAM(s) IV Push every 4 hours PRN Severe Pain (7 - 10)  morphine  - Injectable 1 milliGRAM(s) IV Push every 2 hours PRN Breakthrough pain  oxyCODONE    5 mG/acetaminophen 325 mG 1 Tablet(s) Oral every 4 hours PRN Moderate Pain (4 - 6)      Care Discussed with Consultants/Other Providers [x] YES  [ ] NO    HEALTH ISSUES - PROBLEM Dx:  GERD (gastroesophageal reflux disease): GERD (gastroesophageal reflux disease)  Tobacco dependence: Tobacco dependence  Anxiety: Anxiety  Sepsis: Sepsis  Pancolitis: Pancolitis

## 2019-02-20 NOTE — PROGRESS NOTE ADULT - SUBJECTIVE AND OBJECTIVE BOX
No events.   No BM overnight.   Loose BM this am with some cramping.   Tolerating diet.        Review of Systems:    General:  No wt loss, fevers, chills, night sweats,fatigue,   CV:  No pain, palpitatioins, hypo/hypertension  Resp:  No dyspnea, cough, tachypnea, wheezing  GI:  No pain, No nausea, No vomiting, No diarrhea, No constipatiion, No weight loss, No fever, No pruritis, No rectal bleeding, No tarry stools, No dysphagia,  :  No pain, bleeding, incontinence, nocturia        Vital Signs Last 24 Hrs  T(C): 36.8 (19 Feb 2019 22:41), Max: 36.8 (19 Feb 2019 22:41)  T(F): 98.2 (19 Feb 2019 22:41), Max: 98.2 (19 Feb 2019 22:41)  HR: 67 (19 Feb 2019 22:41) (67 - 69)  BP: 158/86 (19 Feb 2019 22:41) (123/76 - 158/86)  BP(mean): --  RR: 18 (19 Feb 2019 22:41) (18 - 18)  SpO2: 97% (19 Feb 2019 22:41) (97% - 98%)    PHYSICAL EXAM:    Constitutional: NAD, well-developed  Neck: No LAD, supple  Respiratory: CTA and P  Cardiovascular: S1 and S2, RRR, no M  Gastrointestinal: BS+, soft, NT/ND, neg HSM,    MEDICATIONS  (STANDING):  aluminum hydroxide/magnesium hydroxide/simethicone Suspension 30 milliLiter(s) Oral once  anastrozole 1 milliGRAM(s) Oral daily  famotidine    Tablet 20 milliGRAM(s) Oral daily  heparin  Injectable 5000 Unit(s) SubCutaneous every 8 hours  nicotine -  14 mG/24Hr(s) Patch 1 patch Transdermal daily  pantoprazole  Injectable 40 milliGRAM(s) IV Push two times a day  sertraline 200 milliGRAM(s) Oral daily  sodium chloride 0.9%. 1000 milliLiter(s) (75 mL/Hr) IV Continuous <Continuous>  vancomycin    Solution 125 milliGRAM(s) Oral every 6 hours    MEDICATIONS  (PRN):  ALPRAZolam 0.5 milliGRAM(s) Oral two times a day PRN anxiety  aluminum hydroxide/magnesium hydroxide/simethicone Suspension 30 milliLiter(s) Oral every 6 hours PRN Dyspepsia  aluminum hydroxide/magnesium hydroxide/simethicone Suspension 30 milliLiter(s) Oral every 4 hours PRN Dyspepsia  ketorolac   Injectable 15 milliGRAM(s) IV Push every 8 hours PRN Moderate Pain (4 - 6)  morphine  - Injectable 2 milliGRAM(s) IV Push every 4 hours PRN Severe Pain (7 - 10)  morphine  - Injectable 1 milliGRAM(s) IV Push every 2 hours PRN Breakthrough pain  oxyCODONE    5 mG/acetaminophen 325 mG 1 Tablet(s) Oral every 4 hours PRN Moderate Pain (4 - 6)      Allergies    latex (Unknown)  No Known Drug Allergies    Intolerances          LABS:                        11.0   6.21  )-----------( 217      ( 18 Feb 2019 09:50 )             32.4                     RADIOLOGY & ADDITIONAL TESTS:

## 2019-02-20 NOTE — PROGRESS NOTE ADULT - ASSESSMENT
58 y/o female with h/o history of breast CA with RIGHT MRM presumed to be in remission, breast implants, moderate tobacco use since age 15, depression who presented for diffuse abdominal pain found to have pancolitis on CT imaging. +c.diff.

## 2019-02-20 NOTE — PROGRESS NOTE ADULT - PROBLEM SELECTOR PLAN 1
Pt presented with diffuse abdominal pain a/w multiple episodes of diarrhea.   CT abdomen with pancolitis, worse in the sigmoid colon.   - Given sepsis criteria on admission started on Zosyn IV initially but discontinued after c.diff diagnosis.   - c.diff PCR indeterminate initially, now positive. (last time it was neg).  - started empiric vanco PO with some improved symptoms.   - not ready to go home yet. still having diarrhea.  - c/w gentle IVF hydration. advanced to low fiber diet.   - monitor for clinical improvement  - pain control. add percocet PRN pain, Maalox for gas pain

## 2019-02-21 RX ORDER — VANCOMYCIN HCL 1 G
125 VIAL (EA) INTRAVENOUS
Qty: 0 | Refills: 0 | COMMUNITY

## 2019-02-21 RX ORDER — VANCOMYCIN HCL 1 G
125 VIAL (EA) INTRAVENOUS
Qty: 4500 | Refills: 0
Start: 2019-02-21 | End: 2019-03-01

## 2019-02-21 RX ORDER — VANCOMYCIN HCL 1 G
125 VIAL (EA) INTRAVENOUS
Qty: 4500 | Refills: 0 | OUTPATIENT
Start: 2019-02-21 | End: 2019-03-01

## 2019-02-21 RX ADMIN — HEPARIN SODIUM 5000 UNIT(S): 5000 INJECTION INTRAVENOUS; SUBCUTANEOUS at 06:23

## 2019-02-21 RX ADMIN — Medication 1 PATCH: at 17:49

## 2019-02-21 RX ADMIN — OXYCODONE AND ACETAMINOPHEN 1 TABLET(S): 5; 325 TABLET ORAL at 09:22

## 2019-02-21 RX ADMIN — Medication 0.5 MILLIGRAM(S): at 00:35

## 2019-02-21 RX ADMIN — OXYCODONE AND ACETAMINOPHEN 1 TABLET(S): 5; 325 TABLET ORAL at 01:26

## 2019-02-21 RX ADMIN — OXYCODONE AND ACETAMINOPHEN 1 TABLET(S): 5; 325 TABLET ORAL at 23:36

## 2019-02-21 RX ADMIN — OXYCODONE AND ACETAMINOPHEN 1 TABLET(S): 5; 325 TABLET ORAL at 10:25

## 2019-02-21 RX ADMIN — PANTOPRAZOLE SODIUM 40 MILLIGRAM(S): 20 TABLET, DELAYED RELEASE ORAL at 06:23

## 2019-02-21 RX ADMIN — Medication 1 PATCH: at 18:57

## 2019-02-21 RX ADMIN — OXYCODONE AND ACETAMINOPHEN 1 TABLET(S): 5; 325 TABLET ORAL at 13:50

## 2019-02-21 RX ADMIN — Medication 125 MILLIGRAM(S): at 06:23

## 2019-02-21 RX ADMIN — Medication 125 MILLIGRAM(S): at 12:21

## 2019-02-21 RX ADMIN — HEPARIN SODIUM 5000 UNIT(S): 5000 INJECTION INTRAVENOUS; SUBCUTANEOUS at 22:27

## 2019-02-21 RX ADMIN — OXYCODONE AND ACETAMINOPHEN 1 TABLET(S): 5; 325 TABLET ORAL at 17:47

## 2019-02-21 RX ADMIN — OXYCODONE AND ACETAMINOPHEN 1 TABLET(S): 5; 325 TABLET ORAL at 00:35

## 2019-02-21 RX ADMIN — Medication 125 MILLIGRAM(S): at 00:35

## 2019-02-21 RX ADMIN — Medication 125 MILLIGRAM(S): at 22:28

## 2019-02-21 RX ADMIN — SODIUM CHLORIDE 75 MILLILITER(S): 9 INJECTION INTRAMUSCULAR; INTRAVENOUS; SUBCUTANEOUS at 09:23

## 2019-02-21 RX ADMIN — Medication 0.5 MILLIGRAM(S): at 23:39

## 2019-02-21 RX ADMIN — ANASTROZOLE 1 MILLIGRAM(S): 1 TABLET ORAL at 13:25

## 2019-02-21 RX ADMIN — Medication 125 MILLIGRAM(S): at 17:49

## 2019-02-21 RX ADMIN — OXYCODONE AND ACETAMINOPHEN 1 TABLET(S): 5; 325 TABLET ORAL at 14:22

## 2019-02-21 RX ADMIN — SERTRALINE 200 MILLIGRAM(S): 25 TABLET, FILM COATED ORAL at 12:20

## 2019-02-21 RX ADMIN — FAMOTIDINE 20 MILLIGRAM(S): 10 INJECTION INTRAVENOUS at 12:20

## 2019-02-21 RX ADMIN — PANTOPRAZOLE SODIUM 40 MILLIGRAM(S): 20 TABLET, DELAYED RELEASE ORAL at 17:49

## 2019-02-21 NOTE — PROGRESS NOTE ADULT - ASSESSMENT
Cdiff colitis, slowly resolving  Continue PO vancomycin  Likely D/C by tomorrow if formed stool.  Continue total Vanco course 14 days.    [phone]

## 2019-02-21 NOTE — PROGRESS NOTE ADULT - PROBLEM SELECTOR PLAN 1
Pt presented with diffuse abdominal pain a/w multiple episodes of diarrhea.   CT abdomen with pancolitis, worse in the sigmoid colon.   - Given sepsis criteria on admission started on Zosyn IV initially but discontinued after c.diff diagnosis.   - c.diff PCR positive  - c/w vanco PO, gradually improving.   - Tolerating low fiber diet. d/c IVF.  - monitor for clinical improvement  - pain control. add percocet PRN pain, Maalox for gas pain  - dc planning once diarrhea resolved.

## 2019-02-21 NOTE — PROGRESS NOTE ADULT - PROBLEM SELECTOR PLAN 2
Met sepsis criteria on admission with elevation HR > 100 and WBC with source likely colonic in origin.   - c/w vanco PO as above

## 2019-02-21 NOTE — PROGRESS NOTE ADULT - SUBJECTIVE AND OBJECTIVE BOX
Patient is a 59y old  Female who presents with a chief complaint of Abdominal pain (20 Feb 2019 09:53)      SUBJECTIVE / OVERNIGHT EVENTS:  ate dinner and this am breakfast.  two BM, loose stool mixed with some formed "balls"  abdominal pain resolved.  musculoskeletal back pain stable.  no cp, no sob, no n/v/d. no abdominal pain.  no headache, no dizziness.       Vital Signs Last 24 Hrs  T(C): 36.8 (21 Feb 2019 10:32), Max: 36.9 (20 Feb 2019 17:22)  T(F): 98.2 (21 Feb 2019 10:32), Max: 98.4 (20 Feb 2019 17:22)  HR: 71 (21 Feb 2019 10:32) (63 - 73)  BP: 121/76 (21 Feb 2019 10:32) (121/76 - 144/87)  BP(mean): --  RR: 18 (21 Feb 2019 10:32) (18 - 18)  SpO2: 99% (21 Feb 2019 10:32) (95% - 99%)  I&O's Summary    20 Feb 2019 07:01  -  21 Feb 2019 07:00  --------------------------------------------------------  IN: 1150 mL / OUT: 0 mL / NET: 1150 mL        PHYSICAL EXAM:  GENERAL: NAD, Comfortable  HEAD:  Atraumatic, Normocephalic  EYES: EOMI, PERRLA, conjunctiva and sclera clear  NECK: Supple, No JVD  CHEST/LUNG: Clear to auscultation bilaterally; No wheeze  HEART: Regular rate and rhythm; No murmurs, rubs, or gallops  ABDOMEN: Soft, Nontender, Nondistended; Bowel sounds present  Neuro: AAO x 3, no focal deficit, 5/5 b/l extremities  EXTREMITIES:  2+ Peripheral Pulses, No clubbing, cyanosis, or edema  SKIN: No rashes or lesions    LABS:            CAPILLARY BLOOD GLUCOSE                RADIOLOGY & ADDITIONAL TESTS:    Imaging Personally Reviewed:  [x] YES  [ ] NO    Consultant(s) Notes Reviewed:  [x] YES  [ ] NO      MEDICATIONS  (STANDING):  aluminum hydroxide/magnesium hydroxide/simethicone Suspension 30 milliLiter(s) Oral once  anastrozole 1 milliGRAM(s) Oral daily  famotidine    Tablet 20 milliGRAM(s) Oral daily  heparin  Injectable 5000 Unit(s) SubCutaneous every 8 hours  nicotine -  14 mG/24Hr(s) Patch 1 patch Transdermal daily  pantoprazole  Injectable 40 milliGRAM(s) IV Push two times a day  sertraline 200 milliGRAM(s) Oral daily  vancomycin    Solution 125 milliGRAM(s) Oral every 6 hours    MEDICATIONS  (PRN):  ALPRAZolam 0.5 milliGRAM(s) Oral two times a day PRN anxiety  aluminum hydroxide/magnesium hydroxide/simethicone Suspension 30 milliLiter(s) Oral every 6 hours PRN Dyspepsia  aluminum hydroxide/magnesium hydroxide/simethicone Suspension 30 milliLiter(s) Oral every 4 hours PRN Dyspepsia  ketorolac   Injectable 15 milliGRAM(s) IV Push every 8 hours PRN Moderate Pain (4 - 6)  morphine  - Injectable 2 milliGRAM(s) IV Push every 4 hours PRN Severe Pain (7 - 10)  morphine  - Injectable 1 milliGRAM(s) IV Push every 2 hours PRN Breakthrough pain  oxyCODONE    5 mG/acetaminophen 325 mG 1 Tablet(s) Oral every 4 hours PRN Moderate Pain (4 - 6)      Care Discussed with Consultants/Other Providers [x] YES  [ ] NO    HEALTH ISSUES - PROBLEM Dx:  Infectious colitis: Infectious colitis  GERD (gastroesophageal reflux disease): GERD (gastroesophageal reflux disease)  Tobacco dependence: Tobacco dependence  Anxiety: Anxiety  Sepsis: Sepsis  Pancolitis: Pancolitis

## 2019-02-21 NOTE — PROGRESS NOTE ADULT - SUBJECTIVE AND OBJECTIVE BOX
Bm significantly improved.  Semiformed.  Less frequency.    Tolerating diet.        Review of Systems:    General:  No wt loss, fevers, chills, night sweats,fatigue,   CV:  No pain, palpitatioins, hypo/hypertension  Resp:  No dyspnea, cough, tachypnea, wheezing  GI:  see HPI  :  No pain, bleeding, incontinence, nocturia    Vital Signs Last 24 Hrs  T(C): 36.8 (21 Feb 2019 10:32), Max: 36.9 (20 Feb 2019 17:22)  T(F): 98.2 (21 Feb 2019 10:32), Max: 98.4 (20 Feb 2019 17:22)  HR: 71 (21 Feb 2019 10:32) (71 - 73)  BP: 121/76 (21 Feb 2019 10:32) (121/76 - 144/87)  BP(mean): --  RR: 18 (21 Feb 2019 10:32) (18 - 18)  SpO2: 99% (21 Feb 2019 10:32) (95% - 99%)    LABS:      PHYSICAL EXAM:    Constitutional: NAD, well-developed  Neck: No LAD, supple  Respiratory: CTA and P  Cardiovascular: S1 and S2, RRR, no M  Gastrointestinal: BS+, soft, NT/ND, neg HSM,    MEDICATIONS  (STANDING):  aluminum hydroxide/magnesium hydroxide/simethicone Suspension 30 milliLiter(s) Oral once  anastrozole 1 milliGRAM(s) Oral daily  famotidine    Tablet 20 milliGRAM(s) Oral daily  heparin  Injectable 5000 Unit(s) SubCutaneous every 8 hours  nicotine -  14 mG/24Hr(s) Patch 1 patch Transdermal daily  pantoprazole  Injectable 40 milliGRAM(s) IV Push two times a day  sertraline 200 milliGRAM(s) Oral daily  sodium chloride 0.9%. 1000 milliLiter(s) (75 mL/Hr) IV Continuous <Continuous>  vancomycin    Solution 125 milliGRAM(s) Oral every 6 hours    MEDICATIONS  (PRN):  ALPRAZolam 0.5 milliGRAM(s) Oral two times a day PRN anxiety  aluminum hydroxide/magnesium hydroxide/simethicone Suspension 30 milliLiter(s) Oral every 6 hours PRN Dyspepsia  aluminum hydroxide/magnesium hydroxide/simethicone Suspension 30 milliLiter(s) Oral every 4 hours PRN Dyspepsia  ketorolac   Injectable 15 milliGRAM(s) IV Push every 8 hours PRN Moderate Pain (4 - 6)  morphine  - Injectable 2 milliGRAM(s) IV Push every 4 hours PRN Severe Pain (7 - 10)  morphine  - Injectable 1 milliGRAM(s) IV Push every 2 hours PRN Breakthrough pain  oxyCODONE    5 mG/acetaminophen 325 mG 1 Tablet(s) Oral every 4 hours PRN Moderate Pain (4 - 6)      Allergies    latex (Unknown)  No Known Drug Allergies    Intolerances

## 2019-02-22 VITALS
SYSTOLIC BLOOD PRESSURE: 147 MMHG | TEMPERATURE: 97 F | HEART RATE: 74 BPM | DIASTOLIC BLOOD PRESSURE: 77 MMHG | OXYGEN SATURATION: 97 % | RESPIRATION RATE: 18 BRPM

## 2019-02-22 PROCEDURE — 84295 ASSAY OF SERUM SODIUM: CPT

## 2019-02-22 PROCEDURE — 81001 URINALYSIS AUTO W/SCOPE: CPT

## 2019-02-22 PROCEDURE — 86803 HEPATITIS C AB TEST: CPT

## 2019-02-22 PROCEDURE — 87086 URINE CULTURE/COLONY COUNT: CPT

## 2019-02-22 PROCEDURE — 96361 HYDRATE IV INFUSION ADD-ON: CPT

## 2019-02-22 PROCEDURE — 82435 ASSAY OF BLOOD CHLORIDE: CPT

## 2019-02-22 PROCEDURE — 96376 TX/PRO/DX INJ SAME DRUG ADON: CPT

## 2019-02-22 PROCEDURE — 99285 EMERGENCY DEPT VISIT HI MDM: CPT | Mod: 25

## 2019-02-22 PROCEDURE — 87493 C DIFF AMPLIFIED PROBE: CPT

## 2019-02-22 PROCEDURE — 83735 ASSAY OF MAGNESIUM: CPT

## 2019-02-22 PROCEDURE — 87040 BLOOD CULTURE FOR BACTERIA: CPT

## 2019-02-22 PROCEDURE — 87324 CLOSTRIDIUM AG IA: CPT

## 2019-02-22 PROCEDURE — 87507 IADNA-DNA/RNA PROBE TQ 12-25: CPT

## 2019-02-22 PROCEDURE — 82330 ASSAY OF CALCIUM: CPT

## 2019-02-22 PROCEDURE — 74177 CT ABD & PELVIS W/CONTRAST: CPT

## 2019-02-22 PROCEDURE — 82947 ASSAY GLUCOSE BLOOD QUANT: CPT

## 2019-02-22 PROCEDURE — 96365 THER/PROPH/DIAG IV INF INIT: CPT | Mod: XU

## 2019-02-22 PROCEDURE — 82803 BLOOD GASES ANY COMBINATION: CPT

## 2019-02-22 PROCEDURE — 87449 NOS EACH ORGANISM AG IA: CPT

## 2019-02-22 PROCEDURE — 96375 TX/PRO/DX INJ NEW DRUG ADDON: CPT

## 2019-02-22 PROCEDURE — 83690 ASSAY OF LIPASE: CPT

## 2019-02-22 PROCEDURE — 85027 COMPLETE CBC AUTOMATED: CPT

## 2019-02-22 PROCEDURE — 84132 ASSAY OF SERUM POTASSIUM: CPT

## 2019-02-22 PROCEDURE — 80048 BASIC METABOLIC PNL TOTAL CA: CPT

## 2019-02-22 PROCEDURE — 80053 COMPREHEN METABOLIC PANEL: CPT

## 2019-02-22 PROCEDURE — 83605 ASSAY OF LACTIC ACID: CPT

## 2019-02-22 PROCEDURE — 85014 HEMATOCRIT: CPT

## 2019-02-22 RX ORDER — OMEPRAZOLE 10 MG/1
1 CAPSULE, DELAYED RELEASE ORAL
Qty: 0 | Refills: 0 | COMMUNITY

## 2019-02-22 RX ORDER — LACTOBACILLUS ACIDOPHILUS 100MM CELL
1 CAPSULE ORAL DAILY
Qty: 0 | Refills: 0 | Status: DISCONTINUED | OUTPATIENT
Start: 2019-02-22 | End: 2019-02-22

## 2019-02-22 RX ORDER — L.ACIDOPH/B.ANIMALIS/B.LONGUM 15B CELL
1 CAPSULE ORAL
Qty: 0 | Refills: 0 | COMMUNITY

## 2019-02-22 RX ORDER — L.ACIDOPH/B.ANIMALIS/B.LONGUM 15B CELL
1 CAPSULE ORAL
Qty: 30 | Refills: 0
Start: 2019-02-22 | End: 2019-03-23

## 2019-02-22 RX ADMIN — Medication 1 TABLET(S): at 12:35

## 2019-02-22 RX ADMIN — SERTRALINE 200 MILLIGRAM(S): 25 TABLET, FILM COATED ORAL at 12:31

## 2019-02-22 RX ADMIN — OXYCODONE AND ACETAMINOPHEN 1 TABLET(S): 5; 325 TABLET ORAL at 09:35

## 2019-02-22 RX ADMIN — OXYCODONE AND ACETAMINOPHEN 1 TABLET(S): 5; 325 TABLET ORAL at 13:20

## 2019-02-22 RX ADMIN — Medication 125 MILLIGRAM(S): at 12:31

## 2019-02-22 RX ADMIN — ANASTROZOLE 1 MILLIGRAM(S): 1 TABLET ORAL at 12:31

## 2019-02-22 RX ADMIN — FAMOTIDINE 20 MILLIGRAM(S): 10 INJECTION INTRAVENOUS at 12:31

## 2019-02-22 RX ADMIN — PANTOPRAZOLE SODIUM 40 MILLIGRAM(S): 20 TABLET, DELAYED RELEASE ORAL at 06:40

## 2019-02-22 RX ADMIN — OXYCODONE AND ACETAMINOPHEN 1 TABLET(S): 5; 325 TABLET ORAL at 08:50

## 2019-02-22 RX ADMIN — OXYCODONE AND ACETAMINOPHEN 1 TABLET(S): 5; 325 TABLET ORAL at 12:35

## 2019-02-22 RX ADMIN — Medication 125 MILLIGRAM(S): at 06:40

## 2019-02-22 NOTE — PROGRESS NOTE ADULT - SUBJECTIVE AND OBJECTIVE BOX
Patient is a 59y old  Female who presents with a chief complaint of Abdominal pain (21 Feb 2019 13:03)      SUBJECTIVE / OVERNIGHT EVENTS:  no BM since yesterday.  tolerating diet.  no cp, no sob, no n/v/d. no abdominal pain.  no headache, no dizziness.   feeling better.       Vital Signs Last 24 Hrs  T(C): 36.6 (22 Feb 2019 08:28), Max: 36.8 (21 Feb 2019 10:32)  T(F): 97.8 (22 Feb 2019 08:28), Max: 98.2 (21 Feb 2019 10:32)  HR: 77 (22 Feb 2019 08:28) (70 - 77)  BP: 147/78 (22 Feb 2019 08:28) (121/76 - 147/78)  BP(mean): --  RR: 18 (22 Feb 2019 08:28) (18 - 18)  SpO2: 97% (22 Feb 2019 08:28) (97% - 99%)  I&O's Summary    21 Feb 2019 07:01  -  22 Feb 2019 07:00  --------------------------------------------------------  IN: 420 mL / OUT: 0 mL / NET: 420 mL    22 Feb 2019 07:01  -  22 Feb 2019 10:29  --------------------------------------------------------  IN: 150 mL / OUT: 0 mL / NET: 150 mL        PHYSICAL EXAM:  GENERAL: NAD, Comfortable  HEAD:  Atraumatic, Normocephalic  EYES: EOMI, PERRLA, conjunctiva and sclera clear  NECK: Supple, No JVD  CHEST/LUNG: Clear to auscultation bilaterally; No wheeze  HEART: Regular rate and rhythm; No murmurs, rubs, or gallops  ABDOMEN: Soft, Nontender, Nondistended; Bowel sounds present  Neuro: AAO x 3, no focal deficit, 5/5 b/l extremities  EXTREMITIES:  2+ Peripheral Pulses, No clubbing, cyanosis, or edema  SKIN: No rashes or lesions    LABS:            CAPILLARY BLOOD GLUCOSE                RADIOLOGY & ADDITIONAL TESTS:    Imaging Personally Reviewed:  [x] YES  [ ] NO    Consultant(s) Notes Reviewed:  [x] YES  [ ] NO      MEDICATIONS  (STANDING):  aluminum hydroxide/magnesium hydroxide/simethicone Suspension 30 milliLiter(s) Oral once  anastrozole 1 milliGRAM(s) Oral daily  famotidine    Tablet 20 milliGRAM(s) Oral daily  heparin  Injectable 5000 Unit(s) SubCutaneous every 8 hours  lactobacillus acidophilus 1 Tablet(s) Oral daily  nicotine -  14 mG/24Hr(s) Patch 1 patch Transdermal daily  pantoprazole  Injectable 40 milliGRAM(s) IV Push two times a day  sertraline 200 milliGRAM(s) Oral daily  vancomycin    Solution 125 milliGRAM(s) Oral every 6 hours    MEDICATIONS  (PRN):  ALPRAZolam 0.5 milliGRAM(s) Oral two times a day PRN anxiety  aluminum hydroxide/magnesium hydroxide/simethicone Suspension 30 milliLiter(s) Oral every 6 hours PRN Dyspepsia  aluminum hydroxide/magnesium hydroxide/simethicone Suspension 30 milliLiter(s) Oral every 4 hours PRN Dyspepsia  ketorolac   Injectable 15 milliGRAM(s) IV Push every 8 hours PRN Moderate Pain (4 - 6)  morphine  - Injectable 2 milliGRAM(s) IV Push every 4 hours PRN Severe Pain (7 - 10)  morphine  - Injectable 1 milliGRAM(s) IV Push every 2 hours PRN Breakthrough pain  oxyCODONE    5 mG/acetaminophen 325 mG 1 Tablet(s) Oral every 4 hours PRN Moderate Pain (4 - 6)      Care Discussed with Consultants/Other Providers [x] YES  [ ] NO    HEALTH ISSUES - PROBLEM Dx:  Infectious colitis: Infectious colitis  GERD (gastroesophageal reflux disease): GERD (gastroesophageal reflux disease)  Tobacco dependence: Tobacco dependence  Anxiety: Anxiety  Sepsis: Sepsis  Pancolitis: Pancolitis

## 2019-02-22 NOTE — PROGRESS NOTE ADULT - ASSESSMENT
Cdiff colitis, resolving    D/C on PO Vanco 125mg q6h total course 14 days.  Probiotics    [phone]

## 2019-02-22 NOTE — PROGRESS NOTE ADULT - ATTENDING COMMENTS
d/c c.diff isolation.  d/c planning home with outpt GI follow up.     - Dr. BRIAN Kearns ([facility])  - [phone]

## 2019-02-22 NOTE — PROGRESS NOTE ADULT - ASSESSMENT
60 y/o female with h/o history of breast CA with RIGHT MRM presumed to be in remission, breast implants, moderate tobacco use since age 15, depression who presented for diffuse abdominal pain found to have pancolitis on CT imaging. +c.diff.

## 2019-02-22 NOTE — PROGRESS NOTE ADULT - PROBLEM SELECTOR PLAN 1
Pt presented with diffuse abdominal pain a/w multiple episodes of diarrhea.   CT abdomen with pancolitis, worse in the sigmoid colon.   - Given sepsis criteria on admission started on Zosyn IV initially but discontinued after c.diff diagnosis.   - c.diff PCR positive  - started on vanco PO and much improved. no further diarrhea.  - Tolerating low fiber diet. d/c IVF.  - pain control. percocet PRN pain, Maalox for gas pain  - dc planning

## 2019-02-22 NOTE — PROGRESS NOTE ADULT - SUBJECTIVE AND OBJECTIVE BOX
Bm now formed.  No diarrhea.        Review of Systems:    General:  No wt loss, fevers, chills, night sweats,fatigue,   CV:  No pain, palpitatioins, hypo/hypertension  Resp:  No dyspnea, cough, tachypnea, wheezing  GI:  see HPI  :  No pain, bleeding, incontinence, nocturia    Vital Signs Last 24 Hrs  T(C): 36.6 (22 Feb 2019 08:28), Max: 36.7 (21 Feb 2019 16:07)  T(F): 97.8 (22 Feb 2019 08:28), Max: 98.1 (21 Feb 2019 21:42)  HR: 77 (22 Feb 2019 08:28) (70 - 77)  BP: 147/78 (22 Feb 2019 08:28) (126/76 - 147/78)  BP(mean): --  RR: 18 (22 Feb 2019 08:28) (18 - 18)  SpO2: 97% (22 Feb 2019 08:28) (97% - 98%)    LABS:      PHYSICAL EXAM:    Constitutional: NAD, well-developed  Neck: No LAD, supple  Respiratory: CTA and P  Cardiovascular: S1 and S2, RRR, no M  Gastrointestinal: BS+, soft, NT/ND, neg HSM,    MEDICATIONS  (STANDING):  aluminum hydroxide/magnesium hydroxide/simethicone Suspension 30 milliLiter(s) Oral once  anastrozole 1 milliGRAM(s) Oral daily  famotidine    Tablet 20 milliGRAM(s) Oral daily  heparin  Injectable 5000 Unit(s) SubCutaneous every 8 hours  nicotine -  14 mG/24Hr(s) Patch 1 patch Transdermal daily  pantoprazole  Injectable 40 milliGRAM(s) IV Push two times a day  sertraline 200 milliGRAM(s) Oral daily  sodium chloride 0.9%. 1000 milliLiter(s) (75 mL/Hr) IV Continuous <Continuous>  vancomycin    Solution 125 milliGRAM(s) Oral every 6 hours    MEDICATIONS  (PRN):  ALPRAZolam 0.5 milliGRAM(s) Oral two times a day PRN anxiety  aluminum hydroxide/magnesium hydroxide/simethicone Suspension 30 milliLiter(s) Oral every 6 hours PRN Dyspepsia  aluminum hydroxide/magnesium hydroxide/simethicone Suspension 30 milliLiter(s) Oral every 4 hours PRN Dyspepsia  ketorolac   Injectable 15 milliGRAM(s) IV Push every 8 hours PRN Moderate Pain (4 - 6)  morphine  - Injectable 2 milliGRAM(s) IV Push every 4 hours PRN Severe Pain (7 - 10)  morphine  - Injectable 1 milliGRAM(s) IV Push every 2 hours PRN Breakthrough pain  oxyCODONE    5 mG/acetaminophen 325 mG 1 Tablet(s) Oral every 4 hours PRN Moderate Pain (4 - 6)      Allergies    latex (Unknown)  No Known Drug Allergies    Intolerances        LABS:

## 2019-03-07 PROBLEM — K57.92 DIVERTICULITIS OF INTESTINE, PART UNSPECIFIED, WITHOUT PERFORATION OR ABSCESS WITHOUT BLEEDING: Chronic | Status: ACTIVE | Noted: 2019-02-16

## 2019-03-17 ENCOUNTER — FORM ENCOUNTER (OUTPATIENT)
Age: 59
End: 2019-03-17

## 2019-03-18 ENCOUNTER — APPOINTMENT (OUTPATIENT)
Dept: MRI IMAGING | Facility: IMAGING CENTER | Age: 59
End: 2019-03-18
Payer: COMMERCIAL

## 2019-03-18 ENCOUNTER — OUTPATIENT (OUTPATIENT)
Dept: OUTPATIENT SERVICES | Facility: HOSPITAL | Age: 59
LOS: 1 days | End: 2019-03-18
Payer: COMMERCIAL

## 2019-03-18 DIAGNOSIS — C50.919 MALIGNANT NEOPLASM OF UNSPECIFIED SITE OF UNSPECIFIED FEMALE BREAST: ICD-10-CM

## 2019-03-18 PROCEDURE — 77049 MRI BREAST C-+ W/CAD BI: CPT | Mod: 26

## 2019-03-18 PROCEDURE — C8937: CPT

## 2019-03-18 PROCEDURE — A9585: CPT

## 2019-03-18 PROCEDURE — C8908: CPT

## 2019-03-19 ENCOUNTER — NON-APPOINTMENT (OUTPATIENT)
Age: 59
End: 2019-03-19

## 2019-03-19 ENCOUNTER — LABORATORY RESULT (OUTPATIENT)
Age: 59
End: 2019-03-19

## 2019-03-19 ENCOUNTER — APPOINTMENT (OUTPATIENT)
Dept: CARDIOLOGY | Facility: CLINIC | Age: 59
End: 2019-03-19
Payer: COMMERCIAL

## 2019-03-19 VITALS
BODY MASS INDEX: 19.44 KG/M2 | HEART RATE: 80 BPM | DIASTOLIC BLOOD PRESSURE: 70 MMHG | OXYGEN SATURATION: 99 % | HEIGHT: 62.28 IN | WEIGHT: 107 LBS | SYSTOLIC BLOOD PRESSURE: 100 MMHG | TEMPERATURE: 98 F

## 2019-03-19 DIAGNOSIS — I25.10 ATHEROSCLEROTIC HEART DISEASE OF NATIVE CORONARY ARTERY W/OUT ANGINA PECTORIS: ICD-10-CM

## 2019-03-19 DIAGNOSIS — Z87.891 PERSONAL HISTORY OF NICOTINE DEPENDENCE: ICD-10-CM

## 2019-03-19 PROCEDURE — 93306 TTE W/DOPPLER COMPLETE: CPT

## 2019-03-19 PROCEDURE — 93000 ELECTROCARDIOGRAM COMPLETE: CPT

## 2019-03-19 PROCEDURE — 99386 PREV VISIT NEW AGE 40-64: CPT

## 2019-03-19 RX ORDER — ZOLEDRONIC ACID 5 MG/100ML
5 INJECTION, SOLUTION INTRAVENOUS
Refills: 0 | Status: ACTIVE | COMMUNITY

## 2019-03-19 NOTE — PHYSICAL EXAM

## 2019-03-19 NOTE — HISTORY OF PRESENT ILLNESS
[FreeTextEntry1] : f/u medical issues  [de-identified] : pt presents for f/u pt with hx of remote breast cancer alcoholism  sober x 5.5 years .pt with hx of smoking .pt with hx of c diff with recurrence of diahrrea and rediagnosis of above started on vancomycin 125 qid seeing gi pt denies any chest  pain dizziness ,lightheadedness ,nausea vomiting diaphoresis\par

## 2019-03-20 ENCOUNTER — TRANSCRIPTION ENCOUNTER (OUTPATIENT)
Age: 59
End: 2019-03-20

## 2019-03-23 LAB
25(OH)D3 SERPL-MCNC: 33.4 NG/ML
ALBUMIN SERPL ELPH-MCNC: 5 G/DL
ALP BLD-CCNC: 68 U/L
ALT SERPL-CCNC: 15 U/L
ANION GAP SERPL CALC-SCNC: 13 MMOL/L
APPEARANCE: CLEAR
AST SERPL-CCNC: 25 U/L
BILIRUB SERPL-MCNC: <0.2 MG/DL
BILIRUBIN URINE: NEGATIVE
BLOOD URINE: NEGATIVE
BUN SERPL-MCNC: 11 MG/DL
CALCIUM SERPL-MCNC: 9.9 MG/DL
CHLORIDE SERPL-SCNC: 97 MMOL/L
CHOLEST SERPL-MCNC: 363 MG/DL
CHOLEST/HDLC SERPL: 6.9 RATIO
CO2 SERPL-SCNC: 29 MMOL/L
COLOR: COLORLESS
CREAT SERPL-MCNC: 0.83 MG/DL
FERRITIN SERPL-MCNC: 116 NG/ML
FOLATE SERPL-MCNC: <2 NG/ML
GLUCOSE QUALITATIVE U: NEGATIVE
GLUCOSE SERPL-MCNC: 100 MG/DL
HAPTOGLOB SERPL-MCNC: 231 MG/DL
HBA1C MFR BLD HPLC: 6 %
HDLC SERPL-MCNC: 53 MG/DL
IRON SERPL-MCNC: 75 UG/DL
KETONES URINE: NEGATIVE
LDH SERPL-CCNC: 254 U/L
LDLC SERPL CALC-MCNC: 264 MG/DL
LEUKOCYTE ESTERASE URINE: NEGATIVE
MAGNESIUM SERPL-MCNC: 2.3 MG/DL
NITRITE URINE: NEGATIVE
PH URINE: 7
PHOSPHATE SERPL-MCNC: 4.2 MG/DL
POTASSIUM SERPL-SCNC: 5 MMOL/L
PROT SERPL-MCNC: 7.5 G/DL
PROTEIN URINE: NEGATIVE
SODIUM SERPL-SCNC: 139 MMOL/L
SPECIFIC GRAVITY URINE: 1
T3RU NFR SERPL: 1.2 TBI
T4 FREE SERPL-MCNC: 1 NG/DL
T4 SERPL-MCNC: 6.4 UG/DL
TRANSFERRIN SERPL-MCNC: 265 MG/DL
TRIGL SERPL-MCNC: 231 MG/DL
TSH SERPL-ACNC: 0.77 UIU/ML
URATE SERPL-MCNC: 2.2 MG/DL
UROBILINOGEN URINE: NORMAL
VIT B12 SERPL-MCNC: 794 PG/ML

## 2019-03-25 LAB
BASOPHILS # BLD AUTO: 0.06 K/UL
BASOPHILS NFR BLD AUTO: 0.7 %
EOSINOPHIL # BLD AUTO: 0.11 K/UL
EOSINOPHIL NFR BLD AUTO: 1.3 %
HCT VFR BLD CALC: 40.2 %
HGB BLD-MCNC: 12.6 G/DL
IMM GRANULOCYTES NFR BLD AUTO: 0.1 %
LYMPHOCYTES # BLD AUTO: 3.31 K/UL
LYMPHOCYTES NFR BLD AUTO: 37.9 %
MAN DIFF?: NORMAL
MCHC RBC-ENTMCNC: 30.9 PG
MCHC RBC-ENTMCNC: 31.3 GM/DL
MCV RBC AUTO: 98.5 FL
MONOCYTES # BLD AUTO: 0.75 K/UL
MONOCYTES NFR BLD AUTO: 8.6 %
NEUTROPHILS # BLD AUTO: 4.49 K/UL
NEUTROPHILS NFR BLD AUTO: 51.4 %
PLATELET # BLD AUTO: 271 K/UL
RBC # BLD: 4.08 M/UL
RBC # FLD: 13.2 %
WBC # FLD AUTO: 8.73 K/UL

## 2019-03-26 ENCOUNTER — MEDICATION RENEWAL (OUTPATIENT)
Age: 59
End: 2019-03-26

## 2019-03-29 ENCOUNTER — APPOINTMENT (OUTPATIENT)
Dept: CT IMAGING | Facility: IMAGING CENTER | Age: 59
End: 2019-03-29

## 2019-04-01 ENCOUNTER — FORM ENCOUNTER (OUTPATIENT)
Age: 59
End: 2019-04-01

## 2019-04-02 ENCOUNTER — APPOINTMENT (OUTPATIENT)
Dept: CT IMAGING | Facility: CLINIC | Age: 59
End: 2019-04-02
Payer: COMMERCIAL

## 2019-04-02 ENCOUNTER — OUTPATIENT (OUTPATIENT)
Dept: OUTPATIENT SERVICES | Facility: HOSPITAL | Age: 59
LOS: 1 days | End: 2019-04-02
Payer: COMMERCIAL

## 2019-04-02 ENCOUNTER — OUTPATIENT (OUTPATIENT)
Dept: OUTPATIENT SERVICES | Facility: HOSPITAL | Age: 59
LOS: 1 days | Discharge: HOME | End: 2019-04-02

## 2019-04-02 VITALS — WEIGHT: 105 LBS | BODY MASS INDEX: 20.62 KG/M2 | HEIGHT: 60 IN

## 2019-04-02 DIAGNOSIS — C50.919 MALIGNANT NEOPLASM OF UNSPECIFIED SITE OF UNSPECIFIED FEMALE BREAST: ICD-10-CM

## 2019-04-02 DIAGNOSIS — F17.200 NICOTINE DEPENDENCE, UNSPECIFIED, UNCOMPLICATED: ICD-10-CM

## 2019-04-02 DIAGNOSIS — Z12.2 ENCOUNTER FOR SCREENING FOR MALIGNANT NEOPLASM OF RESPIRATORY ORGANS: ICD-10-CM

## 2019-04-02 PROCEDURE — G0297: CPT | Mod: 26

## 2019-04-02 PROCEDURE — G0297: CPT

## 2019-04-03 ENCOUNTER — APPOINTMENT (OUTPATIENT)
Dept: INFUSION THERAPY | Facility: HOSPITAL | Age: 59
End: 2019-04-03

## 2019-04-04 DIAGNOSIS — M81.0 AGE-RELATED OSTEOPOROSIS WITHOUT CURRENT PATHOLOGICAL FRACTURE: ICD-10-CM

## 2019-04-04 DIAGNOSIS — Z51.11 ENCOUNTER FOR ANTINEOPLASTIC CHEMOTHERAPY: ICD-10-CM

## 2019-04-04 DIAGNOSIS — M81.8 OTHER OSTEOPOROSIS WITHOUT CURRENT PATHOLOGICAL FRACTURE: ICD-10-CM

## 2019-04-08 PROBLEM — I25.10 ATHEROSCLEROTIC HEART DISEASE OF NATIVE CORONARY ARTERY WITHOUT ANGINA PECTORIS: Status: ACTIVE | Noted: 2019-04-08

## 2019-05-15 ENCOUNTER — RX RENEWAL (OUTPATIENT)
Age: 59
End: 2019-05-15

## 2019-05-23 NOTE — ED ADULT TRIAGE NOTE - BP NONINVASIVE SYSTOLIC (MM HG)
Zay Ramos (DPM)  Podiatric Medicine and Surgery  [address] [place] [address]  Phone: [phone]  Fax: [phone]  Follow Up Time:     Aixa Landaverde)  Infectious Disease; Internal Medicine  [address] [place] [address]  Phone: [phone]  Fax: [phone]  Follow Up Time:
124

## 2019-05-30 ENCOUNTER — RESULT REVIEW (OUTPATIENT)
Age: 59
End: 2019-05-30

## 2019-07-12 ENCOUNTER — RX RENEWAL (OUTPATIENT)
Age: 59
End: 2019-07-12

## 2019-07-15 RX ORDER — ALPRAZOLAM 0.5 MG/1
0.5 TABLET ORAL 3 TIMES DAILY
Qty: 90 | Refills: 0 | Status: DISCONTINUED | OUTPATIENT
Start: 2019-07-12 | End: 2019-07-15

## 2019-07-17 ENCOUNTER — MEDICATION RENEWAL (OUTPATIENT)
Age: 59
End: 2019-07-17

## 2019-08-12 ENCOUNTER — RX RENEWAL (OUTPATIENT)
Age: 59
End: 2019-08-12

## 2019-08-13 ENCOUNTER — RX RENEWAL (OUTPATIENT)
Age: 59
End: 2019-08-13

## 2019-08-13 ENCOUNTER — MEDICATION RENEWAL (OUTPATIENT)
Age: 59
End: 2019-08-13

## 2019-08-13 RX ORDER — SERTRALINE HYDROCHLORIDE 100 MG/1
100 TABLET, FILM COATED ORAL DAILY
Qty: 90 | Refills: 1 | Status: DISCONTINUED | COMMUNITY
End: 2019-08-13

## 2019-08-14 ENCOUNTER — APPOINTMENT (OUTPATIENT)
Dept: COLORECTAL SURGERY | Facility: CLINIC | Age: 59
End: 2019-08-14
Payer: COMMERCIAL

## 2019-08-14 VITALS — SYSTOLIC BLOOD PRESSURE: 147 MMHG | DIASTOLIC BLOOD PRESSURE: 83 MMHG | HEART RATE: 72 BPM

## 2019-08-14 DIAGNOSIS — F17.200 NICOTINE DEPENDENCE, UNSPECIFIED, UNCOMPLICATED: ICD-10-CM

## 2019-08-14 DIAGNOSIS — F17.210 NICOTINE DEPENDENCE, CIGARETTES, UNCOMPLICATED: ICD-10-CM

## 2019-08-14 PROCEDURE — 46600 DIAGNOSTIC ANOSCOPY SPX: CPT

## 2019-08-14 PROCEDURE — 99203 OFFICE O/P NEW LOW 30 MIN: CPT | Mod: 25

## 2019-08-14 RX ORDER — VANCOMYCIN HYDROCHLORIDE 125 MG/1
125 CAPSULE ORAL
Refills: 0 | Status: DISCONTINUED | COMMUNITY
Start: 2019-03-19 | End: 2019-08-14

## 2019-08-14 NOTE — ASSESSMENT
[FreeTextEntry1] : Visit on-year-old female with pelvic floor laxity and mildly swollen external hemorrhoids

## 2019-08-14 NOTE — HISTORY OF PRESENT ILLNESS
[FreeTextEntry1] : 59-year-old female with a history of breast cancer and uncomplicated diverticulitis recently treated with IV antibiotics presents complaining of "hemorrhoids".  She states that the symptoms wax and wane and consist mainly of pain without bleeding.

## 2019-08-14 NOTE — PHYSICAL EXAM
[Abdomen Masses] : No abdominal masses [Tender] : nontender [None] : no anal fissures seen [Normal rectal exam] : exam was normal [Multiple Sinus Tracts] : no perianal sinus tracts [Excoriation] : no perianal excoriation [Fistula] : no fistulas [Ulcer ___ cm] : no ulcers [Wart] : no warts [Pilonidal Cyst] : no pilonidal cysts [Pilonidal Sinus] : no pilonidal sinus [Pilonidal Sinus Draining] : no pilonidal sinus drainage [Thrombosed] : that was not thrombosed [Tender, Swollen] : tender, swollen [Skin Tags] : residual hemorrhoidal skin tags were noted [Lax] : was lax [de-identified] : Benign [de-identified] : Mildly swollen external hemorrhoids, noninflamed internal hemorrhoids. Pelvic floor laxity

## 2019-09-03 ENCOUNTER — APPOINTMENT (OUTPATIENT)
Dept: CARDIOLOGY | Facility: CLINIC | Age: 59
End: 2019-09-03
Payer: COMMERCIAL

## 2019-09-03 VITALS
HEART RATE: 88 BPM | HEIGHT: 60 IN | DIASTOLIC BLOOD PRESSURE: 62 MMHG | OXYGEN SATURATION: 95 % | BODY MASS INDEX: 20.62 KG/M2 | SYSTOLIC BLOOD PRESSURE: 110 MMHG | WEIGHT: 105.06 LBS | TEMPERATURE: 98.1 F

## 2019-09-03 DIAGNOSIS — K64.4 RESIDUAL HEMORRHOIDAL SKIN TAGS: ICD-10-CM

## 2019-09-03 DIAGNOSIS — R53.83 OTHER FATIGUE: ICD-10-CM

## 2019-09-03 PROCEDURE — 99213 OFFICE O/P EST LOW 20 MIN: CPT

## 2019-09-03 NOTE — PHYSICAL EXAM
[No Acute Distress] : no acute distress [Well Nourished] : well nourished [Well Developed] : well developed [Well-Appearing] : well-appearing [Normal Sclera/Conjunctiva] : normal sclera/conjunctiva [PERRL] : pupils equal round and reactive to light [EOMI] : extraocular movements intact [Normal Outer Ear/Nose] : the outer ears and nose were normal in appearance [Normal Oropharynx] : the oropharynx was normal [No Lymphadenopathy] : no lymphadenopathy [No JVD] : no jugular venous distention [Thyroid Normal, No Nodules] : the thyroid was normal and there were no nodules present [Supple] : supple [No Accessory Muscle Use] : no accessory muscle use [No Respiratory Distress] : no respiratory distress  [Clear to Auscultation] : lungs were clear to auscultation bilaterally [Regular Rhythm] : with a regular rhythm [Normal Rate] : normal rate  [No Murmur] : no murmur heard [Normal S1, S2] : normal S1 and S2 [No Carotid Bruits] : no carotid bruits [No Abdominal Bruit] : a ~M bruit was not heard ~T in the abdomen [No Varicosities] : no varicosities [Pedal Pulses Present] : the pedal pulses are present [No Edema] : there was no peripheral edema [No Extremity Clubbing/Cyanosis] : no extremity clubbing/cyanosis [No Palpable Aorta] : no palpable aorta [Soft] : abdomen soft [Non Tender] : non-tender [Non-distended] : non-distended [No Masses] : no abdominal mass palpated [No HSM] : no HSM [Normal Bowel Sounds] : normal bowel sounds [Normal Posterior Cervical Nodes] : no posterior cervical lymphadenopathy [No CVA Tenderness] : no CVA  tenderness [Normal Anterior Cervical Nodes] : no anterior cervical lymphadenopathy [No Spinal Tenderness] : no spinal tenderness [No Joint Swelling] : no joint swelling [Grossly Normal Strength/Tone] : grossly normal strength/tone [No Rash] : no rash [Coordination Grossly Intact] : coordination grossly intact [No Focal Deficits] : no focal deficits [Normal Gait] : normal gait [Deep Tendon Reflexes (DTR)] : deep tendon reflexes were 2+ and symmetric [Normal Affect] : the affect was normal [Normal Insight/Judgement] : insight and judgment were intact

## 2019-09-03 NOTE — HISTORY OF PRESENT ILLNESS
[FreeTextEntry1] : F/U medical Issues  [de-identified] : This is a 59 year old lady with a PMH of HLD, Breast CA, Alcoholism (quit 5.5 years ago), and smoking presents today for follow up. Patient states that her CDIFF has resolved, and she recently developed some external hemorrhoids to the anal area. Patient states that she has not experienced any bleeding and has followed up with Dr. Espinal (Colorectal surgeon) who prescribed her a cream that she states is helping. Patient also states that her neck and back pain have been acting up and is starting to bother her again. Patient has been taking Naproxen twice daily to help with the pain, although it provides minimal relief. Patient states that when she goes for massages that does help. Patient has been taking Pravastatin 20 mg every other day as she explains that she has experienced some muscle aches. She is willing to try taking CoQ10 to help with the aching. Patient also explains that she is occasionally taking her Folic Acid. Patient denies dyspnea, palpitations, chest pain, nausea, vomiting, dizziness and lightheadedness.\par

## 2019-09-08 LAB
ALBUMIN SERPL ELPH-MCNC: 5 G/DL
ALP BLD-CCNC: 61 U/L
ALT SERPL-CCNC: 11 U/L
ANION GAP SERPL CALC-SCNC: 16 MMOL/L
AST SERPL-CCNC: 22 U/L
BASOPHILS # BLD AUTO: 0.08 K/UL
BASOPHILS NFR BLD AUTO: 0.9 %
BILIRUB DIRECT SERPL-MCNC: 0.1 MG/DL
BILIRUB INDIRECT SERPL-MCNC: 0.2 MG/DL
BILIRUB SERPL-MCNC: 0.3 MG/DL
BUN SERPL-MCNC: 12 MG/DL
CALCIUM SERPL-MCNC: 10.2 MG/DL
CHLORIDE SERPL-SCNC: 100 MMOL/L
CHOLEST SERPL-MCNC: 289 MG/DL
CHOLEST/HDLC SERPL: 3.6 RATIO
CK SERPL-CCNC: 95 U/L
CO2 SERPL-SCNC: 25 MMOL/L
CREAT SERPL-MCNC: 0.97 MG/DL
EOSINOPHIL # BLD AUTO: 0.08 K/UL
EOSINOPHIL NFR BLD AUTO: 0.9 %
ESTIMATED AVERAGE GLUCOSE: 120 MG/DL
FERRITIN SERPL-MCNC: 102 NG/ML
FOLATE SERPL-MCNC: 5.3 NG/ML
GLUCOSE SERPL-MCNC: 96 MG/DL
HBA1C MFR BLD HPLC: 5.8 %
HCT VFR BLD CALC: 40.6 %
HDLC SERPL-MCNC: 80 MG/DL
HGB BLD-MCNC: 13.5 G/DL
IMM GRANULOCYTES NFR BLD AUTO: 0.2 %
IRON SERPL-MCNC: 108 UG/DL
LDLC SERPL CALC-MCNC: 180 MG/DL
LYMPHOCYTES # BLD AUTO: 3.1 K/UL
LYMPHOCYTES NFR BLD AUTO: 35.6 %
MAN DIFF?: NORMAL
MCHC RBC-ENTMCNC: 31 PG
MCHC RBC-ENTMCNC: 33.3 GM/DL
MCV RBC AUTO: 93.1 FL
MONOCYTES # BLD AUTO: 0.67 K/UL
MONOCYTES NFR BLD AUTO: 7.7 %
NEUTROPHILS # BLD AUTO: 4.75 K/UL
NEUTROPHILS NFR BLD AUTO: 54.7 %
PLATELET # BLD AUTO: 230 K/UL
POTASSIUM SERPL-SCNC: 5.1 MMOL/L
PROT SERPL-MCNC: 7.3 G/DL
RBC # BLD: 4.36 M/UL
RBC # FLD: 13.2 %
SODIUM SERPL-SCNC: 141 MMOL/L
TRANSFERRIN SERPL-MCNC: 295 MG/DL
TRIGL SERPL-MCNC: 147 MG/DL
VIT B12 SERPL-MCNC: 645 PG/ML
WBC # FLD AUTO: 8.7 K/UL

## 2019-10-15 ENCOUNTER — MEDICATION RENEWAL (OUTPATIENT)
Age: 59
End: 2019-10-15

## 2019-12-10 ENCOUNTER — OUTPATIENT (OUTPATIENT)
Dept: OUTPATIENT SERVICES | Facility: HOSPITAL | Age: 59
LOS: 1 days | Discharge: HOME | End: 2019-12-10

## 2019-12-10 DIAGNOSIS — C50.919 MALIGNANT NEOPLASM OF UNSPECIFIED SITE OF UNSPECIFIED FEMALE BREAST: ICD-10-CM

## 2019-12-17 ENCOUNTER — RESULT REVIEW (OUTPATIENT)
Age: 59
End: 2019-12-17

## 2019-12-17 ENCOUNTER — APPOINTMENT (OUTPATIENT)
Dept: HEMATOLOGY ONCOLOGY | Facility: CLINIC | Age: 59
End: 2019-12-17
Payer: COMMERCIAL

## 2019-12-17 VITALS
RESPIRATION RATE: 16 BRPM | BODY MASS INDEX: 20.56 KG/M2 | WEIGHT: 105.25 LBS | HEART RATE: 84 BPM | SYSTOLIC BLOOD PRESSURE: 130 MMHG | DIASTOLIC BLOOD PRESSURE: 80 MMHG | OXYGEN SATURATION: 99 % | TEMPERATURE: 98.5 F

## 2019-12-17 LAB
HCT VFR BLD CALC: 36.4 % — SIGNIFICANT CHANGE UP (ref 34.5–45)
HGB BLD-MCNC: 12.8 G/DL — SIGNIFICANT CHANGE UP (ref 11.5–15.5)
MCHC RBC-ENTMCNC: 34 PG — SIGNIFICANT CHANGE UP (ref 27–34)
MCHC RBC-ENTMCNC: 35.3 G/DL — SIGNIFICANT CHANGE UP (ref 32–36)
MCV RBC AUTO: 96.2 FL — SIGNIFICANT CHANGE UP (ref 80–100)
PLATELET # BLD AUTO: 182 K/UL — SIGNIFICANT CHANGE UP (ref 150–400)
RBC # BLD: 3.78 M/UL — LOW (ref 3.8–5.2)
RBC # FLD: 12 % — SIGNIFICANT CHANGE UP (ref 10.3–14.5)
WBC # BLD: 8.2 K/UL — SIGNIFICANT CHANGE UP (ref 3.8–10.5)
WBC # FLD AUTO: 8.2 K/UL — SIGNIFICANT CHANGE UP (ref 3.8–10.5)

## 2019-12-17 PROCEDURE — 99213 OFFICE O/P EST LOW 20 MIN: CPT

## 2019-12-18 LAB
ALBUMIN SERPL ELPH-MCNC: 4.7 G/DL
ALP BLD-CCNC: 61 U/L
ALT SERPL-CCNC: 10 U/L
ANION GAP SERPL CALC-SCNC: 11 MMOL/L
AST SERPL-CCNC: 24 U/L
BILIRUB SERPL-MCNC: <0.2 MG/DL
BUN SERPL-MCNC: 11 MG/DL
CALCIUM SERPL-MCNC: 9.8 MG/DL
CANCER AG27-29 SERPL-ACNC: 38.3 U/ML
CEA SERPL-MCNC: 6.9 NG/ML
CHLORIDE SERPL-SCNC: 96 MMOL/L
CO2 SERPL-SCNC: 30 MMOL/L
CREAT SERPL-MCNC: 0.95 MG/DL
GLUCOSE SERPL-MCNC: 110 MG/DL
POTASSIUM SERPL-SCNC: 5.1 MMOL/L
PROT SERPL-MCNC: 7 G/DL
SODIUM SERPL-SCNC: 137 MMOL/L

## 2019-12-18 NOTE — HISTORY OF PRESENT ILLNESS
[Disease: _____________________] : Disease: [unfilled] [Cardiovascular] : Cardiovascular [Constitutional] : Constitutional [ENT] : ENT [Dermatologic] : Dermatologic [Endocrine] : Endocrine [Gastrointestinal] : Gastrointestinal [Genitourinary] : Genitourinary [Gynecologic] : Gynecologic [Infectious] : Infectious [Musculoskeletal] : Musculoskeletal [Neurologic] : Neurologic [Pain] : Pain [Pulmonary] : Pulmonary [Hematologic] : Hematologic [de-identified] :  50-year-old female coming in for recommendations and follow-up for her newly diagnosed tubular carcinoma of the right breast.  According to the patient she was diagnosed with this in April 2010.  Prior, patient had gone for a bilateral  mammogram with breast sonogram on March 1, 2010 which showed on the right breast at 7 to 8 o’clock position 6 mm from the nipple there was an irregularly bordered hypoechoic solid appearing  nodule measuring 7 x 4 x 5 mm.  There was also 6 x 4 x 5 mm hypoechoic nodule at 1 cm medially with smooth margins. At the 11 to 12 o’clock positions 5 cm from the nipple, there was a hypoechoic nodule questionably associated with calcification and at 11 o’clock position and 4 cm from the nipple there was a hypoechoic nodule possibly cyst with debris measuring 3 x 2 mm.  Patient has history of bilateral breast implants.  On March 5, 2010 patient had fine needle aspiration which came back as on the right breast at 7 to 8 o’clock position a cellular breast lesion with atypia suspicious for carcinoma, would recommend excision for complete evaluation.  The right breast 11 to 12 o’clock position biopsy was acellular specimen.  Patient on March 23, 2010 underwent bilateral breast MRI’s. At that time the MRI was very difficult because of patient motion. There was a suspicious enhancement of the right lower outer quadrant and right upper inner quadrant, in the left breast there was an enhancing mass in the lower outer quadrant.    Patient underwent in April 2010 partial right mastectomy. The final pathology showed well differentiated tubular carcinoma, florid proliferative fibrocystic change with nodular adenosis. The invasive tumor had and SBR score of 3/9. The tumor had a maximum dimension of 0.8 cm.  The excision biopsy of the right breast at the 11 o’clock position showed mild proliferative fibrocystic changes. Estrogen receptor was positive, progesterone receptor was positive, Ki-67 was positive 10%  and (*) was 1+.  Her right sentinel lymph node excision and right breast excision were negative for any kind of carcinoma.  Since then patient has been on Arimidex and has been tolerating it well. [de-identified] : Patient is here for follow up, feeling well.  She was recently in the hospital for diverticulitis then readmitted for C diff colitis.  She has fully recovered.\par Mammogram is scheduled for January 2020.\par Bone density January 2018.

## 2019-12-26 ENCOUNTER — MEDICATION RENEWAL (OUTPATIENT)
Age: 59
End: 2019-12-26

## 2020-01-07 ENCOUNTER — FORM ENCOUNTER (OUTPATIENT)
Age: 60
End: 2020-01-07

## 2020-01-08 ENCOUNTER — APPOINTMENT (OUTPATIENT)
Dept: MAMMOGRAPHY | Facility: IMAGING CENTER | Age: 60
End: 2020-01-08
Payer: COMMERCIAL

## 2020-01-08 ENCOUNTER — OUTPATIENT (OUTPATIENT)
Dept: OUTPATIENT SERVICES | Facility: HOSPITAL | Age: 60
LOS: 1 days | End: 2020-01-08
Payer: COMMERCIAL

## 2020-01-08 ENCOUNTER — APPOINTMENT (OUTPATIENT)
Dept: ULTRASOUND IMAGING | Facility: IMAGING CENTER | Age: 60
End: 2020-01-08
Payer: COMMERCIAL

## 2020-01-08 DIAGNOSIS — C50.919 MALIGNANT NEOPLASM OF UNSPECIFIED SITE OF UNSPECIFIED FEMALE BREAST: ICD-10-CM

## 2020-01-08 PROCEDURE — 77063 BREAST TOMOSYNTHESIS BI: CPT | Mod: 26

## 2020-01-08 PROCEDURE — 77067 SCR MAMMO BI INCL CAD: CPT | Mod: 26

## 2020-01-08 PROCEDURE — 76641 ULTRASOUND BREAST COMPLETE: CPT | Mod: 26,50

## 2020-01-08 PROCEDURE — 76641 ULTRASOUND BREAST COMPLETE: CPT

## 2020-01-08 PROCEDURE — 77067 SCR MAMMO BI INCL CAD: CPT

## 2020-01-08 PROCEDURE — 77063 BREAST TOMOSYNTHESIS BI: CPT

## 2020-07-11 ENCOUNTER — RX RENEWAL (OUTPATIENT)
Age: 60
End: 2020-07-11

## 2020-09-14 ENCOUNTER — NON-APPOINTMENT (OUTPATIENT)
Age: 60
End: 2020-09-14

## 2020-09-14 ENCOUNTER — APPOINTMENT (OUTPATIENT)
Dept: CARDIOLOGY | Facility: CLINIC | Age: 60
End: 2020-09-14
Payer: COMMERCIAL

## 2020-09-14 VITALS
HEART RATE: 80 BPM | SYSTOLIC BLOOD PRESSURE: 136 MMHG | WEIGHT: 105 LBS | BODY MASS INDEX: 20.62 KG/M2 | HEIGHT: 60 IN | TEMPERATURE: 98.5 F | OXYGEN SATURATION: 96 % | DIASTOLIC BLOOD PRESSURE: 80 MMHG

## 2020-09-14 DIAGNOSIS — Z71.89 OTHER SPECIFIED COUNSELING: ICD-10-CM

## 2020-09-14 DIAGNOSIS — F17.200 NICOTINE DEPENDENCE, UNSPECIFIED, UNCOMPLICATED: ICD-10-CM

## 2020-09-14 PROCEDURE — 99396 PREV VISIT EST AGE 40-64: CPT

## 2020-09-14 PROCEDURE — 93000 ELECTROCARDIOGRAM COMPLETE: CPT

## 2020-09-14 RX ORDER — PRAVASTATIN SODIUM 40 MG/1
40 TABLET ORAL
Qty: 1 | Refills: 1 | Status: DISCONTINUED | COMMUNITY
Start: 2019-09-25 | End: 2020-09-14

## 2020-09-14 NOTE — HISTORY OF PRESENT ILLNESS
[de-identified] : Julieta is a 59yo female with PMH of alcoholism, anxiety, breast cancer, CAD, GERD, HLD, and osteoporosis who presents today for her annual physical exam. Patient reports no issues or concerns for today. Patient denies chest pain, shortness of breath, palpitations, dizziness, vision changes, n/v, abdominal pain, changes in bowel/bladder habits,  or appetite.  pt with persistent anxiety secondary to social issues \par

## 2020-09-14 NOTE — PHYSICAL EXAM
[Well Nourished] : well nourished [Well Developed] : well developed [No Acute Distress] : no acute distress [Normal Sclera/Conjunctiva] : normal sclera/conjunctiva [PERRL] : pupils equal round and reactive to light [Well-Appearing] : well-appearing [EOMI] : extraocular movements intact [Normal Outer Ear/Nose] : the outer ears and nose were normal in appearance [No Lymphadenopathy] : no lymphadenopathy [Normal Oropharynx] : the oropharynx was normal [No JVD] : no jugular venous distention [Thyroid Normal, No Nodules] : the thyroid was normal and there were no nodules present [No Respiratory Distress] : no respiratory distress  [Supple] : supple [No Accessory Muscle Use] : no accessory muscle use [Normal Rate] : normal rate  [Clear to Auscultation] : lungs were clear to auscultation bilaterally [Regular Rhythm] : with a regular rhythm [Normal S1, S2] : normal S1 and S2 [No Varicosities] : no varicosities [No Abdominal Bruit] : a ~M bruit was not heard ~T in the abdomen [No Carotid Bruits] : no carotid bruits [No Edema] : there was no peripheral edema [No Palpable Aorta] : no palpable aorta [Pedal Pulses Present] : the pedal pulses are present [No Extremity Clubbing/Cyanosis] : no extremity clubbing/cyanosis [Soft] : abdomen soft [Non-distended] : non-distended [Non Tender] : non-tender [No Masses] : no abdominal mass palpated [No HSM] : no HSM [Normal Bowel Sounds] : normal bowel sounds [Normal Posterior Cervical Nodes] : no posterior cervical lymphadenopathy [Normal Anterior Cervical Nodes] : no anterior cervical lymphadenopathy [No CVA Tenderness] : no CVA  tenderness [No Joint Swelling] : no joint swelling [Grossly Normal Strength/Tone] : grossly normal strength/tone [No Spinal Tenderness] : no spinal tenderness [No Focal Deficits] : no focal deficits [Coordination Grossly Intact] : coordination grossly intact [No Rash] : no rash [Normal Insight/Judgement] : insight and judgment were intact [Normal Gait] : normal gait [Normal Affect] : the affect was normal [de-identified] : 1/6 systolic murmur llsb

## 2020-09-21 ENCOUNTER — APPOINTMENT (OUTPATIENT)
Dept: CARDIOLOGY | Facility: CLINIC | Age: 60
End: 2020-09-21
Payer: COMMERCIAL

## 2020-09-21 DIAGNOSIS — Z80.3 FAMILY HISTORY OF MALIGNANT NEOPLASM OF BREAST: ICD-10-CM

## 2020-09-21 PROCEDURE — XXXXX: CPT

## 2020-09-21 PROCEDURE — 93306 TTE W/DOPPLER COMPLETE: CPT

## 2020-09-29 ENCOUNTER — OUTPATIENT (OUTPATIENT)
Dept: OUTPATIENT SERVICES | Facility: HOSPITAL | Age: 60
LOS: 1 days | End: 2020-09-29
Payer: COMMERCIAL

## 2020-09-29 ENCOUNTER — APPOINTMENT (OUTPATIENT)
Dept: MRI IMAGING | Facility: IMAGING CENTER | Age: 60
End: 2020-09-29
Payer: COMMERCIAL

## 2020-09-29 DIAGNOSIS — Z00.8 ENCOUNTER FOR OTHER GENERAL EXAMINATION: ICD-10-CM

## 2020-09-29 DIAGNOSIS — C50.919 MALIGNANT NEOPLASM OF UNSPECIFIED SITE OF UNSPECIFIED FEMALE BREAST: ICD-10-CM

## 2020-09-30 ENCOUNTER — RESULT REVIEW (OUTPATIENT)
Age: 60
End: 2020-09-30

## 2020-09-30 PROCEDURE — C8908: CPT

## 2020-09-30 PROCEDURE — C8937: CPT

## 2020-09-30 PROCEDURE — 77049 MRI BREAST C-+ W/CAD BI: CPT | Mod: 26

## 2020-09-30 PROCEDURE — A9585: CPT

## 2020-10-06 NOTE — PATIENT PROFILE ADULT - DO YOU FEEL UNSAFE AT HOME, WORK, OR SCHOOL?
PROCEDURE NOTE: Eylea Prefilled Syringe 2mg OS. Diagnosis: Diabetic Macular Edema. Prior to injection, risks/benefits/alternatives discussed including corneal abrasion, infection, loss of vision, hemorrhage, cataract, glaucoma, retinal tears or detachment. A written consent is on file, and the need for today's injection was discussed and the patient is understanding and wishes to proceed. A 30G needle was placed on an Eylea 2mg/0.05ml Pre-filled Syringe. Betadine prep was performed. Topical anesthesia was induced with Alcaine. 4% lidocaine pledge. A lid speculum was used. An intravitreal injection of Eylea was given. Injection site: 3-4 mm from the limbus. The used syringe/needle was transferred to a biohazard container. Lid speculum removed. Mask worn during procedure. Patient tolerated procedure well. Count fingers vision was verified. There were no complications. Patient was given the standard instruction sheet. Mali Serrano no

## 2020-10-21 DIAGNOSIS — R74.8 ABNORMAL LEVELS OF OTHER SERUM ENZYMES: ICD-10-CM

## 2020-10-23 ENCOUNTER — NON-APPOINTMENT (OUTPATIENT)
Age: 60
End: 2020-10-23

## 2020-10-27 ENCOUNTER — NON-APPOINTMENT (OUTPATIENT)
Age: 60
End: 2020-10-27

## 2020-11-13 LAB — SARS-COV-2 N GENE NPH QL NAA+PROBE: NOT DETECTED

## 2020-11-17 ENCOUNTER — APPOINTMENT (OUTPATIENT)
Dept: PULMONOLOGY | Facility: CLINIC | Age: 60
End: 2020-11-17
Payer: COMMERCIAL

## 2020-11-17 VITALS
RESPIRATION RATE: 16 BRPM | BODY MASS INDEX: 19.32 KG/M2 | TEMPERATURE: 98.1 F | HEART RATE: 95 BPM | WEIGHT: 105 LBS | SYSTOLIC BLOOD PRESSURE: 126 MMHG | HEIGHT: 62 IN | OXYGEN SATURATION: 100 % | DIASTOLIC BLOOD PRESSURE: 80 MMHG

## 2020-11-17 DIAGNOSIS — Z20.828 CONTACT WITH AND (SUSPECTED) EXPOSURE TO OTHER VIRAL COMMUNICABLE DISEASES: ICD-10-CM

## 2020-11-17 PROCEDURE — 94729 DIFFUSING CAPACITY: CPT

## 2020-11-17 PROCEDURE — ZZZZZ: CPT

## 2020-11-17 PROCEDURE — 94010 BREATHING CAPACITY TEST: CPT

## 2020-11-17 PROCEDURE — 99204 OFFICE O/P NEW MOD 45 MIN: CPT | Mod: 25

## 2020-11-17 PROCEDURE — 94727 GAS DIL/WSHOT DETER LNG VOL: CPT

## 2020-11-17 RX ORDER — TIOTROPIUM BROMIDE INHALATION SPRAY 3.12 UG/1
2.5 SPRAY, METERED RESPIRATORY (INHALATION) DAILY
Qty: 1 | Refills: 5 | Status: ACTIVE | COMMUNITY
Start: 2020-11-17 | End: 1900-01-01

## 2020-11-17 NOTE — PROCEDURE
[FreeTextEntry1] : 11/17/2020\par Pulmonary function testing\par There is a mild ventilatory impairment in a restrictive pattern. There is elevation in the RV/TLC ratio indicative of possible air trapping. There is a moderate diffusion impairment. \par \par CT reviewed.

## 2020-11-17 NOTE — ASSESSMENT
[FreeTextEntry1] : Spiriva Respimat 2 puffs/day.  Instructed in proper use.\par PRN Beta Agonist.\par We will compare CAT scan to prior CAT scan and make further recommendations.  Likely repeat in 1 year.\par Smoking cessation was urged and discussed.\par Follow-up in 6 to 12 months or sooner on a as needed basis.\par

## 2020-11-17 NOTE — HISTORY OF PRESENT ILLNESS
[Current] : current [TextBox_4] : MELY HENLEY is a 60 year old  F referred for pulmonary evaluation for abnormal CAT.\par \par Had lung cancer screening at [facility]. Prior at [facility]. \par \par Gets annual bronchitis.\par Presently some cough. Has been improving. Some SOB. Predominantly exertional. No definitive wheezing. No CP.\par Usually sees ENT and treated with steroids abx and at times inhaler. Presently not on inhaler. \par On and off pressure in left ear.\par \par Past pulmonary history. COPD\par Occupational Exposure. N\par Family history of pulmonary disease. N\par Recent travel  N\par Pets N\par  [TextBox_11] : 1/2-1 [TextBox_13] : 45

## 2020-11-17 NOTE — DISCUSSION/SUMMARY
[FreeTextEntry1] : Abnormal CAT scan.  Highly likely infectious inflammatory in origin.  Likely related to prior injury.\par Chronic obstructive pulmonary disease with decline in lung function compared to 2012.\par Current every day smoker.

## 2020-11-17 NOTE — PHYSICAL EXAM
[No Acute Distress] : no acute distress [Supple] : supple [No JVD] : no jvd [Normal S1, S2] : normal s1, s2 [No Murmurs] : no murmurs [Normal to Percussion] : normal to percussion [Thyroid Not Enlarged] : thyroid not enlarged [Clear to Auscultation Bilaterally] : clear to auscultation bilaterally [Benign] : benign [No HSM] : no hsm [No Clubbing] : no clubbing [No Cyanosis] : no cyanosis [No Edema] : no edema

## 2020-11-19 LAB — SARS-COV-2 N GENE NPH QL NAA+PROBE: NOT DETECTED

## 2021-03-14 NOTE — PATIENT PROFILE ADULT - NSPROPASSIVESMOKEEXPOSURE_GEN_A_NUR
[Dear  ___] : Dear  [unfilled], [Consult Letter:] : I had the pleasure of evaluating your patient, [unfilled]. [Please see my note below.] : Please see my note below. [Consult Closing:] : Thank you very much for allowing me to participate in the care of this patient.  If you have any questions, please do not hesitate to contact me. [Sincerely,] : Sincerely, [FreeTextEntry2] : Dr. Hamilton Joyce, Dr. Camila Alegria [FreeTextEntry3] : Stepan Resendiz MD, FACS\par System Director, Endocrine Surgery\par [facility]\par Assistant Clinical Professor of Surgery\par [employer] [employer] [facility]  [DrSallie  ___] : Dr. DIXON No

## 2021-07-11 LAB
25(OH)D3 SERPL-MCNC: 53.8 NG/ML
ALBUMIN SERPL ELPH-MCNC: 4.7 G/DL
ALP BLD-CCNC: 62 U/L
ALT SERPL-CCNC: 36 U/L
ANION GAP SERPL CALC-SCNC: 12 MMOL/L
APPEARANCE: CLEAR
AST SERPL-CCNC: 102 U/L
BACTERIA: NEGATIVE
BASOPHILS # BLD AUTO: 0.06 K/UL
BASOPHILS NFR BLD AUTO: 0.6 %
BILIRUB SERPL-MCNC: 0.2 MG/DL
BILIRUBIN URINE: NEGATIVE
BLOOD URINE: NEGATIVE
BUN SERPL-MCNC: 16 MG/DL
CALCIUM SERPL-MCNC: 9.6 MG/DL
CHLORIDE SERPL-SCNC: 95 MMOL/L
CHOLEST SERPL-MCNC: 292 MG/DL
CHOLEST/HDLC SERPL: 3.6 RATIO
CO2 SERPL-SCNC: 29 MMOL/L
COLOR: NORMAL
CREAT SERPL-MCNC: 0.89 MG/DL
EOSINOPHIL # BLD AUTO: 0.09 K/UL
EOSINOPHIL NFR BLD AUTO: 0.9 %
ESTIMATED AVERAGE GLUCOSE: 120 MG/DL
FERRITIN SERPL-MCNC: 146 NG/ML
FOLATE SERPL-MCNC: 3.8 NG/ML
GLUCOSE QUALITATIVE U: NEGATIVE
GLUCOSE SERPL-MCNC: 79 MG/DL
HBA1C MFR BLD HPLC: 5.8 %
HCT VFR BLD CALC: 40.2 %
HDLC SERPL-MCNC: 81 MG/DL
HGB BLD-MCNC: 12.9 G/DL
HYALINE CASTS: 1 /LPF
IMM GRANULOCYTES NFR BLD AUTO: 0.2 %
IRON SATN MFR SERPL: 21 %
IRON SERPL-MCNC: 74 UG/DL
KETONES URINE: NEGATIVE
LDLC SERPL CALC-MCNC: 186 MG/DL
LEUKOCYTE ESTERASE URINE: NEGATIVE
LYMPHOCYTES # BLD AUTO: 3.82 K/UL
LYMPHOCYTES NFR BLD AUTO: 38.4 %
MAN DIFF?: NORMAL
MCHC RBC-ENTMCNC: 31.5 PG
MCHC RBC-ENTMCNC: 32.1 GM/DL
MCV RBC AUTO: 98 FL
MICROSCOPIC-UA: NORMAL
MONOCYTES # BLD AUTO: 0.8 K/UL
MONOCYTES NFR BLD AUTO: 8 %
NEUTROPHILS # BLD AUTO: 5.16 K/UL
NEUTROPHILS NFR BLD AUTO: 51.9 %
NITRITE URINE: NEGATIVE
PH URINE: 7
PLATELET # BLD AUTO: 246 K/UL
POTASSIUM SERPL-SCNC: 4.5 MMOL/L
PROT SERPL-MCNC: 7.1 G/DL
PROTEIN URINE: NEGATIVE
RBC # BLD: 4.1 M/UL
RBC # FLD: 13.5 %
RED BLOOD CELLS URINE: 1 /HPF
SARS-COV-2 IGG SERPL IA-ACNC: <0.1 INDEX
SARS-COV-2 IGG SERPL QL IA: NEGATIVE
SODIUM SERPL-SCNC: 137 MMOL/L
SPECIFIC GRAVITY URINE: 1.01
SQUAMOUS EPITHELIAL CELLS: 0 /HPF
T4 FREE SERPL-MCNC: 1 NG/DL
TIBC SERPL-MCNC: 355 UG/DL
TRIGL SERPL-MCNC: 127 MG/DL
TSH SERPL-ACNC: 0.75 UIU/ML
UIBC SERPL-MCNC: 281 UG/DL
UROBILINOGEN URINE: NORMAL
VIT B12 SERPL-MCNC: 719 PG/ML
WBC # FLD AUTO: 9.95 K/UL
WHITE BLOOD CELLS URINE: 0 /HPF

## 2021-09-02 ENCOUNTER — NON-APPOINTMENT (OUTPATIENT)
Age: 61
End: 2021-09-02

## 2021-09-13 ENCOUNTER — APPOINTMENT (OUTPATIENT)
Dept: CARDIOLOGY | Facility: CLINIC | Age: 61
End: 2021-09-13
Payer: COMMERCIAL

## 2021-09-13 ENCOUNTER — NON-APPOINTMENT (OUTPATIENT)
Age: 61
End: 2021-09-13

## 2021-09-13 ENCOUNTER — LABORATORY RESULT (OUTPATIENT)
Age: 61
End: 2021-09-13

## 2021-09-13 VITALS
HEART RATE: 82 BPM | BODY MASS INDEX: 21.16 KG/M2 | OXYGEN SATURATION: 97 % | HEIGHT: 62 IN | DIASTOLIC BLOOD PRESSURE: 60 MMHG | WEIGHT: 115 LBS | SYSTOLIC BLOOD PRESSURE: 120 MMHG | TEMPERATURE: 98 F

## 2021-09-13 DIAGNOSIS — F10.20 ALCOHOL DEPENDENCE, UNCOMPLICATED: ICD-10-CM

## 2021-09-13 DIAGNOSIS — M85.80 OTHER SPECIFIED DISORDERS OF BONE DENSITY AND STRUCTURE, UNSPECIFIED SITE: ICD-10-CM

## 2021-09-13 DIAGNOSIS — Z71.6 TOBACCO ABUSE COUNSELING: ICD-10-CM

## 2021-09-13 DIAGNOSIS — Z23 ENCOUNTER FOR IMMUNIZATION: ICD-10-CM

## 2021-09-13 DIAGNOSIS — M25.551 PAIN IN RIGHT HIP: ICD-10-CM

## 2021-09-13 DIAGNOSIS — M54.2 CERVICALGIA: ICD-10-CM

## 2021-09-13 DIAGNOSIS — R06.02 SHORTNESS OF BREATH: ICD-10-CM

## 2021-09-13 DIAGNOSIS — F41.9 ANXIETY DISORDER, UNSPECIFIED: ICD-10-CM

## 2021-09-13 PROCEDURE — 93000 ELECTROCARDIOGRAM COMPLETE: CPT

## 2021-09-13 PROCEDURE — 99396 PREV VISIT EST AGE 40-64: CPT

## 2021-09-13 NOTE — REVIEW OF SYSTEMS
[Negative] : Heme/Lymph [Shortness Of Breath] : shortness of breath [FreeTextEntry9] : Back, Neck AND Right Hip pain

## 2021-09-23 ENCOUNTER — APPOINTMENT (OUTPATIENT)
Dept: CARDIOLOGY | Facility: CLINIC | Age: 61
End: 2021-09-23
Payer: COMMERCIAL

## 2021-09-23 PROCEDURE — A9500: CPT

## 2021-09-23 PROCEDURE — 93306 TTE W/DOPPLER COMPLETE: CPT

## 2021-09-23 PROCEDURE — 78452 HT MUSCLE IMAGE SPECT MULT: CPT

## 2021-09-23 PROCEDURE — 93015 CV STRESS TEST SUPVJ I&R: CPT

## 2021-09-23 RX ORDER — REGADENOSON 0.08 MG/ML
0.4 INJECTION, SOLUTION INTRAVENOUS
Qty: 1 | Refills: 0 | Status: COMPLETED | OUTPATIENT
Start: 2021-09-23

## 2021-09-23 RX ADMIN — REGADENOSON 5 MG/5ML: 0.08 INJECTION, SOLUTION INTRAVENOUS at 00:00

## 2021-09-28 ENCOUNTER — NON-APPOINTMENT (OUTPATIENT)
Age: 61
End: 2021-09-28

## 2021-11-14 ENCOUNTER — TRANSCRIPTION ENCOUNTER (OUTPATIENT)
Age: 61
End: 2021-11-14

## 2021-11-16 ENCOUNTER — NON-APPOINTMENT (OUTPATIENT)
Age: 61
End: 2021-11-16

## 2021-11-16 LAB
25(OH)D3 SERPL-MCNC: 64.7 NG/ML
ALBUMIN SERPL ELPH-MCNC: 4.6 G/DL
ALP BLD-CCNC: 66 U/L
ALT SERPL-CCNC: 12 U/L
ANION GAP SERPL CALC-SCNC: 10 MMOL/L
APPEARANCE: CLEAR
AST SERPL-CCNC: 25 U/L
BACTERIA: NEGATIVE
BASOPHILS # BLD AUTO: 0.07 K/UL
BASOPHILS NFR BLD AUTO: 0.6 %
BILIRUB DIRECT SERPL-MCNC: 0.1 MG/DL
BILIRUB INDIRECT SERPL-MCNC: 0.1 MG/DL
BILIRUB SERPL-MCNC: 0.2 MG/DL
BILIRUBIN URINE: NEGATIVE
BLOOD URINE: NEGATIVE
BUN SERPL-MCNC: 19 MG/DL
CALCIUM SERPL-MCNC: 10.1 MG/DL
CHLORIDE SERPL-SCNC: 102 MMOL/L
CHOLEST SERPL-MCNC: 300 MG/DL
CK SERPL-CCNC: 129 U/L
CO2 SERPL-SCNC: 28 MMOL/L
COLOR: YELLOW
COVID-19 NUCLEOCAPSID  GAM ANTIBODY INTERPRETATION: NEGATIVE
COVID-19 SPIKE DOMAIN ANTIBODY INTERPRETATION: POSITIVE
CREAT SERPL-MCNC: 0.97 MG/DL
EOSINOPHIL # BLD AUTO: 0.12 K/UL
EOSINOPHIL NFR BLD AUTO: 1.1 %
ESTIMATED AVERAGE GLUCOSE: 123 MG/DL
FERRITIN SERPL-MCNC: 120 NG/ML
FOLATE SERPL-MCNC: >20 NG/ML
GLUCOSE QUALITATIVE U: NEGATIVE
GLUCOSE SERPL-MCNC: 112 MG/DL
HBA1C MFR BLD HPLC: 5.9 %
HCT VFR BLD CALC: 38.5 %
HDLC SERPL-MCNC: 72 MG/DL
HGB BLD-MCNC: 12.5 G/DL
HYALINE CASTS: 0 /LPF
IMM GRANULOCYTES NFR BLD AUTO: 0.2 %
IRON SATN MFR SERPL: 27 %
IRON SERPL-MCNC: 93 UG/DL
KETONES URINE: NEGATIVE
LDLC SERPL CALC-MCNC: 210 MG/DL
LEUKOCYTE ESTERASE URINE: NEGATIVE
LYMPHOCYTES # BLD AUTO: 3.66 K/UL
LYMPHOCYTES NFR BLD AUTO: 33.3 %
MAGNESIUM SERPL-MCNC: 2.3 MG/DL
MAN DIFF?: NORMAL
MCHC RBC-ENTMCNC: 31.1 PG
MCHC RBC-ENTMCNC: 32.5 GM/DL
MCV RBC AUTO: 95.8 FL
MICROSCOPIC-UA: NORMAL
MONOCYTES # BLD AUTO: 0.8 K/UL
MONOCYTES NFR BLD AUTO: 7.3 %
NEUTROPHILS # BLD AUTO: 6.32 K/UL
NEUTROPHILS NFR BLD AUTO: 57.5 %
NITRITE URINE: NEGATIVE
NONHDLC SERPL-MCNC: 228 MG/DL
PH URINE: 6.5
PHOSPHATE SERPL-MCNC: 3.4 MG/DL
PLATELET # BLD AUTO: 237 K/UL
POTASSIUM SERPL-SCNC: 5.2 MMOL/L
PROT SERPL-MCNC: 7.5 G/DL
PROTEIN URINE: NEGATIVE
RBC # BLD: 4.02 M/UL
RBC # FLD: 12.6 %
RED BLOOD CELLS URINE: 2 /HPF
SARS-COV-2 AB SERPL IA-ACNC: 177 U/ML
SARS-COV-2 AB SERPL QL IA: 0.07 INDEX
SODIUM SERPL-SCNC: 140 MMOL/L
SPECIFIC GRAVITY URINE: 1.01
SQUAMOUS EPITHELIAL CELLS: 0 /HPF
T3RU NFR SERPL: 1.1 TBI
T4 FREE SERPL-MCNC: 1.1 NG/DL
T4 SERPL-MCNC: 5.3 UG/DL
TIBC SERPL-MCNC: 350 UG/DL
TRIGL SERPL-MCNC: 94 MG/DL
TSH SERPL-ACNC: 0.66 UIU/ML
UIBC SERPL-MCNC: 257 UG/DL
URATE SERPL-MCNC: 2.3 MG/DL
UROBILINOGEN URINE: NORMAL
VIT B12 SERPL-MCNC: 665 PG/ML
WBC # FLD AUTO: 10.99 K/UL
WHITE BLOOD CELLS URINE: 1 /HPF

## 2021-11-18 ENCOUNTER — NON-APPOINTMENT (OUTPATIENT)
Age: 61
End: 2021-11-18

## 2021-12-08 ENCOUNTER — NON-APPOINTMENT (OUTPATIENT)
Age: 61
End: 2021-12-08

## 2022-02-07 ENCOUNTER — TRANSCRIPTION ENCOUNTER (OUTPATIENT)
Age: 62
End: 2022-02-07

## 2022-03-06 ENCOUNTER — TRANSCRIPTION ENCOUNTER (OUTPATIENT)
Age: 62
End: 2022-03-06

## 2022-03-07 ENCOUNTER — EMERGENCY (EMERGENCY)
Facility: HOSPITAL | Age: 62
LOS: 1 days | Discharge: HOME | End: 2022-03-07
Attending: EMERGENCY MEDICINE
Payer: COMMERCIAL

## 2022-03-07 ENCOUNTER — NON-APPOINTMENT (OUTPATIENT)
Age: 62
End: 2022-03-07

## 2022-03-07 VITALS
DIASTOLIC BLOOD PRESSURE: 81 MMHG | RESPIRATION RATE: 18 BRPM | TEMPERATURE: 98 F | HEART RATE: 86 BPM | OXYGEN SATURATION: 98 % | SYSTOLIC BLOOD PRESSURE: 147 MMHG

## 2022-03-07 VITALS
RESPIRATION RATE: 18 BRPM | HEIGHT: 62 IN | TEMPERATURE: 99 F | WEIGHT: 104.94 LBS | OXYGEN SATURATION: 97 % | DIASTOLIC BLOOD PRESSURE: 93 MMHG | HEART RATE: 97 BPM | SYSTOLIC BLOOD PRESSURE: 160 MMHG

## 2022-03-07 LAB
ALBUMIN SERPL ELPH-MCNC: 4.7 G/DL — SIGNIFICANT CHANGE UP (ref 3.3–5)
ALP SERPL-CCNC: 60 U/L — SIGNIFICANT CHANGE UP (ref 40–120)
ALT FLD-CCNC: 21 U/L — SIGNIFICANT CHANGE UP (ref 10–45)
ANION GAP SERPL CALC-SCNC: 13 MMOL/L — SIGNIFICANT CHANGE UP (ref 5–17)
APPEARANCE UR: CLEAR — SIGNIFICANT CHANGE UP
AST SERPL-CCNC: 28 U/L — SIGNIFICANT CHANGE UP (ref 10–40)
BASOPHILS # BLD AUTO: 0.05 K/UL — SIGNIFICANT CHANGE UP (ref 0–0.2)
BASOPHILS NFR BLD AUTO: 0.5 % — SIGNIFICANT CHANGE UP (ref 0–2)
BILIRUB SERPL-MCNC: 0.4 MG/DL — SIGNIFICANT CHANGE UP (ref 0.2–1.2)
BILIRUB UR-MCNC: NEGATIVE — SIGNIFICANT CHANGE UP
BUN SERPL-MCNC: 11 MG/DL — SIGNIFICANT CHANGE UP (ref 7–23)
CALCIUM SERPL-MCNC: 9.1 MG/DL — SIGNIFICANT CHANGE UP (ref 8.4–10.5)
CHLORIDE SERPL-SCNC: 97 MMOL/L — SIGNIFICANT CHANGE UP (ref 96–108)
CO2 SERPL-SCNC: 24 MMOL/L — SIGNIFICANT CHANGE UP (ref 22–31)
COLOR SPEC: COLORLESS — SIGNIFICANT CHANGE UP
CREAT SERPL-MCNC: 0.85 MG/DL — SIGNIFICANT CHANGE UP (ref 0.5–1.3)
DIFF PNL FLD: NEGATIVE — SIGNIFICANT CHANGE UP
EGFR: 77 ML/MIN/1.73M2 — SIGNIFICANT CHANGE UP
EOSINOPHIL # BLD AUTO: 0.05 K/UL — SIGNIFICANT CHANGE UP (ref 0–0.5)
EOSINOPHIL NFR BLD AUTO: 0.5 % — SIGNIFICANT CHANGE UP (ref 0–6)
GLUCOSE SERPL-MCNC: 98 MG/DL — SIGNIFICANT CHANGE UP (ref 70–99)
GLUCOSE UR QL: NEGATIVE — SIGNIFICANT CHANGE UP
HCT VFR BLD CALC: 39.3 % — SIGNIFICANT CHANGE UP (ref 34.5–45)
HGB BLD-MCNC: 13.6 G/DL — SIGNIFICANT CHANGE UP (ref 11.5–15.5)
IMM GRANULOCYTES NFR BLD AUTO: 0.3 % — SIGNIFICANT CHANGE UP (ref 0–1.5)
KETONES UR-MCNC: ABNORMAL
LACTATE BLDV-MCNC: 1.2 MMOL/L — SIGNIFICANT CHANGE UP (ref 0.7–2)
LEUKOCYTE ESTERASE UR-ACNC: NEGATIVE — SIGNIFICANT CHANGE UP
LIDOCAIN IGE QN: 40 U/L — SIGNIFICANT CHANGE UP (ref 7–60)
LYMPHOCYTES # BLD AUTO: 2.5 K/UL — SIGNIFICANT CHANGE UP (ref 1–3.3)
LYMPHOCYTES # BLD AUTO: 26.9 % — SIGNIFICANT CHANGE UP (ref 13–44)
MCHC RBC-ENTMCNC: 31.6 PG — SIGNIFICANT CHANGE UP (ref 27–34)
MCHC RBC-ENTMCNC: 34.6 GM/DL — SIGNIFICANT CHANGE UP (ref 32–36)
MCV RBC AUTO: 91.4 FL — SIGNIFICANT CHANGE UP (ref 80–100)
MONOCYTES # BLD AUTO: 0.71 K/UL — SIGNIFICANT CHANGE UP (ref 0–0.9)
MONOCYTES NFR BLD AUTO: 7.6 % — SIGNIFICANT CHANGE UP (ref 2–14)
NEUTROPHILS # BLD AUTO: 5.96 K/UL — SIGNIFICANT CHANGE UP (ref 1.8–7.4)
NEUTROPHILS NFR BLD AUTO: 64.2 % — SIGNIFICANT CHANGE UP (ref 43–77)
NITRITE UR-MCNC: NEGATIVE — SIGNIFICANT CHANGE UP
NRBC # BLD: 0 /100 WBCS — SIGNIFICANT CHANGE UP (ref 0–0)
PH UR: 6.5 — SIGNIFICANT CHANGE UP (ref 5–8)
PLATELET # BLD AUTO: 209 K/UL — SIGNIFICANT CHANGE UP (ref 150–400)
POTASSIUM SERPL-MCNC: 3.9 MMOL/L — SIGNIFICANT CHANGE UP (ref 3.5–5.3)
POTASSIUM SERPL-SCNC: 3.9 MMOL/L — SIGNIFICANT CHANGE UP (ref 3.5–5.3)
PROT SERPL-MCNC: 7.4 G/DL — SIGNIFICANT CHANGE UP (ref 6–8.3)
PROT UR-MCNC: NEGATIVE — SIGNIFICANT CHANGE UP
RBC # BLD: 4.3 M/UL — SIGNIFICANT CHANGE UP (ref 3.8–5.2)
RBC # FLD: 12.1 % — SIGNIFICANT CHANGE UP (ref 10.3–14.5)
SARS-COV-2 RNA SPEC QL NAA+PROBE: SIGNIFICANT CHANGE UP
SODIUM SERPL-SCNC: 134 MMOL/L — LOW (ref 135–145)
SP GR SPEC: 1.02 — SIGNIFICANT CHANGE UP (ref 1.01–1.02)
UROBILINOGEN FLD QL: NEGATIVE — SIGNIFICANT CHANGE UP
WBC # BLD: 9.3 K/UL — SIGNIFICANT CHANGE UP (ref 3.8–10.5)
WBC # FLD AUTO: 9.3 K/UL — SIGNIFICANT CHANGE UP (ref 3.8–10.5)

## 2022-03-07 PROCEDURE — 99285 EMERGENCY DEPT VISIT HI MDM: CPT

## 2022-03-07 PROCEDURE — 80053 COMPREHEN METABOLIC PANEL: CPT

## 2022-03-07 PROCEDURE — 81003 URINALYSIS AUTO W/O SCOPE: CPT

## 2022-03-07 PROCEDURE — 83605 ASSAY OF LACTIC ACID: CPT

## 2022-03-07 PROCEDURE — G1004: CPT

## 2022-03-07 PROCEDURE — U0005: CPT

## 2022-03-07 PROCEDURE — 74177 CT ABD & PELVIS W/CONTRAST: CPT | Mod: 26,ME

## 2022-03-07 PROCEDURE — U0003: CPT

## 2022-03-07 PROCEDURE — 74177 CT ABD & PELVIS W/CONTRAST: CPT | Mod: ME

## 2022-03-07 PROCEDURE — 83690 ASSAY OF LIPASE: CPT

## 2022-03-07 PROCEDURE — 85025 COMPLETE CBC W/AUTO DIFF WBC: CPT

## 2022-03-07 PROCEDURE — 99284 EMERGENCY DEPT VISIT MOD MDM: CPT | Mod: 25

## 2022-03-07 RX ORDER — SODIUM CHLORIDE 9 MG/ML
1000 INJECTION INTRAMUSCULAR; INTRAVENOUS; SUBCUTANEOUS ONCE
Refills: 0 | Status: COMPLETED | OUTPATIENT
Start: 2022-03-07 | End: 2022-03-07

## 2022-03-07 RX ADMIN — SODIUM CHLORIDE 1000 MILLILITER(S): 9 INJECTION INTRAMUSCULAR; INTRAVENOUS; SUBCUTANEOUS at 14:19

## 2022-03-07 NOTE — ED PROVIDER NOTE - PATIENT PORTAL LINK FT
You can access the FollowMyHealth Patient Portal offered by [facility] by registering at the following website: [email or web address]. By joining [facility]’s FollowMyHealth portal, you will also be able to view your health information using other applications (apps) compatible with our system.

## 2022-03-07 NOTE — ED PROVIDER NOTE - PROGRESS NOTE DETAILS
Jay Bess, PGY-2- Patient PO challenged. Tolerating PO. Not in severe pain. PCP follow up advised. Discussed results of work up with patient. Patient agrees with plan to discharge home. All questions answered regarding plan. Strict return precautions given. BILL Vega, Attending: signed out from [facility]. CT without marked disease, possibly mild colitis vs distension. UA, vitals, and other blood work unremarkable. Tolerating PO. Asking for d/c which is safe and reasonable.

## 2022-03-07 NOTE — ED PROVIDER NOTE - CLINICAL SUMMARY MEDICAL DECISION MAKING FREE TEXT BOX
ATTG: : abd pain with nausea and vomiting concern for inflammation (less likely infection) check labs, check ct a.p, ivf, antiemetic, pain medication and re eval for dispo.

## 2022-03-07 NOTE — ED PROVIDER NOTE - NSICDXPASTSURGICALHX_GEN_ALL_CORE_FT
PAST SURGICAL HISTORY:  C Section 88,91,96    H/O partial mastectomy Right     S/P Breast Augmentation 1999

## 2022-03-07 NOTE — ED ADULT NURSE NOTE - OBJECTIVE STATEMENT
PT is an ambulatory 62 year old female A/O x4 and c/o N/V and shakiness for 1 week. PT denies SOB, chest pain, and/or stomach pain.  Lungs CTA PT denies any abdominal surgery. PT last bowel movement was this morning prior to ED visit.  Describes diarrhea as watery and greenish black with no blood.  Pt has PMH of C.diff, diverticulitis and colitis.  Hyperactive bowel sounds. Peripheral pulses 2+

## 2022-03-07 NOTE — ED PROVIDER NOTE - ATTENDING CONTRIBUTION TO CARE
61 y/o f with pmhx diveriticulitis / colitis, breast cancer, current smoker, presents for abd pain associated with vomiting and diarrhea since Friday. states "I havent vomiting in 30 yrs" but now has been vomiting, last yesterday. dry heaves today. Came with her daughter at the bedside. no fever. multiple episodes of vomiting and diarrhea. feels like abd discomfort is same as prior episode of colitis. she went to urgent care yesterday and was started on azithromycin for infection and zofran without improvement. no  urinary complaints. no back pain. no headache.   Gen.  no acute distress  HEENT:  perrl eomi  Lungs:  b/l bs  CVS: S1S2   Abd;  soft no distention no guarding. diffusely tender. no referred tend. no celia tend.   Ext: no pitting edema no erythema  Neuro: aaox3 no focal deficits  MSK: strength 5/5 b/l upper and lower ext Ascan 7.22.21 @ 9:45am

## 2022-03-07 NOTE — ED PROVIDER NOTE - NSFOLLOWUPINSTRUCTIONS_ED_ALL_ED_FT
1) Follow up with your PCP within 1-3 days of being seen in the ED  2) Return to the ED immediately for new or worsening symptoms severe pain, unable to eat/drink, persistent vomiting, blood in stool, not able to stay hydrated   3) Please continue to take any home medications as prescribed  4) Your test results from your ED visit were discussed with you prior to discharge  5) You were provided with a copy of your test results  6) Please discuss with your PCP if you need to continue the antibiotics.

## 2022-03-07 NOTE — ED PROVIDER NOTE - PHYSICAL EXAMINATION
General: well appearing, no acute distress, AOx3  Skin: no rash, no pallor  Head: normocephalic, atraumatic  Eyes: clear conjunctiva, EOMI  ENMT: airway patent, no nasal discharge  Cardiovascular: normal rate, normal rhythm, S1/S2  Pulmonary: clear to auscultation bilaterally, no rales, rhonchi, or wheeze  Abdomen: soft, mild diffuse tenderness, no guarding   Musculoskeletal: moving extremities well, no deformity  Psych: normal mood, normal affect

## 2022-03-07 NOTE — ED PROVIDER NOTE - OBJECTIVE STATEMENT
Jay Bess, PGY-2- 62 year old female with a pmhx of colitis, breast CA s/p partial mastectomy and radiation Jayshamir Bess, PGY-2- 62 year old female with a pmhx of colitis, breast CA s/p partial mastectomy and radiation, presents to ED for evaluation of n/v/diarrhea and generalized abd pain. Pt reports sxs x3 days. Notes 2-3 episodes of vomiting/diarrhea per day. Went to UC yesterday and was prescribed Zofran and Azithromycin. Pt took 2 days of Azithromycin. Pt without fever, chills, cp, sob, bloody stool, dysuria. Hx of C diff. No recent abx prior to 2 days ago. Smokes tobacco. Has had endoscopy/colonoscopy in the past.

## 2022-03-07 NOTE — ED CLERICAL - NS ED CLERK NOTE PRE-ARRIVAL INFORMATION; ADDITIONAL PRE-ARRIVAL INFORMATION
CC/Reason For referral: hx of colitis, abd discomfort., cannot tolerate foods.  Preferred Consultant(if applicable): hospitalist.  Who admits for you (if needed):  Do you have documents you would like to fax over?  Would you still like to speak to an ED attending? yes

## 2022-03-08 ENCOUNTER — NON-APPOINTMENT (OUTPATIENT)
Age: 62
End: 2022-03-08

## 2022-03-09 ENCOUNTER — NON-APPOINTMENT (OUTPATIENT)
Age: 62
End: 2022-03-09

## 2022-03-09 ENCOUNTER — APPOINTMENT (OUTPATIENT)
Dept: CARDIOLOGY | Facility: CLINIC | Age: 62
End: 2022-03-09
Payer: COMMERCIAL

## 2022-03-09 ENCOUNTER — APPOINTMENT (OUTPATIENT)
Dept: GASTROENTEROLOGY | Facility: CLINIC | Age: 62
End: 2022-03-09
Payer: COMMERCIAL

## 2022-03-09 ENCOUNTER — TRANSCRIPTION ENCOUNTER (OUTPATIENT)
Age: 62
End: 2022-03-09

## 2022-03-09 VITALS
WEIGHT: 105 LBS | DIASTOLIC BLOOD PRESSURE: 75 MMHG | SYSTOLIC BLOOD PRESSURE: 120 MMHG | HEIGHT: 62 IN | BODY MASS INDEX: 19.32 KG/M2

## 2022-03-09 VITALS
HEIGHT: 62 IN | TEMPERATURE: 98.8 F | SYSTOLIC BLOOD PRESSURE: 144 MMHG | DIASTOLIC BLOOD PRESSURE: 80 MMHG | WEIGHT: 105 LBS | OXYGEN SATURATION: 95 % | HEART RATE: 92 BPM | BODY MASS INDEX: 19.32 KG/M2

## 2022-03-09 VITALS — HEART RATE: 92 BPM | RESPIRATION RATE: 12 BRPM

## 2022-03-09 DIAGNOSIS — K57.30 DIVERTICULOSIS OF LARGE INTESTINE W/OUT PERFORATION OR ABSCESS W/OUT BLEEDING: ICD-10-CM

## 2022-03-09 DIAGNOSIS — K52.9 NONINFECTIVE GASTROENTERITIS AND COLITIS, UNSPECIFIED: ICD-10-CM

## 2022-03-09 DIAGNOSIS — A49.8 OTHER BACTERIAL INFECTIONS OF UNSPECIFIED SITE: ICD-10-CM

## 2022-03-09 PROCEDURE — 99214 OFFICE O/P EST MOD 30 MIN: CPT

## 2022-03-09 PROCEDURE — 99204 OFFICE O/P NEW MOD 45 MIN: CPT

## 2022-03-09 NOTE — REVIEW OF SYSTEMS
[Nausea] : nausea [Vomiting] : vomiting [Change in Appetite] : change in appetite [Diarrhea] : diarrhea [Negative] : Heme/Lymph [Abdominal Pain] : no abdominal pain [Constipation] : no constipation [Blood in Stool] : no blood in stool

## 2022-03-09 NOTE — HISTORY OF PRESENT ILLNESS
[Heartburn] : denies heartburn [Nausea] : improved nausea [Vomiting] : improved vomiting [Constipation] : denies constipation [Diarrhea] : improved diarrhea [Yellow Skin Or Eyes (Jaundice)] : denies jaundice [Abdominal Pain] : denies abdominal pain [Abdominal Swelling] : denies abdominal swelling [Rectal Pain] : denies rectal pain [Diverticulitis] : diverticulitis [_________] : Performed [unfilled] [Wt Gain ___ Lbs] : no recent weight gain [GERD] : no gastroesophageal reflux disease [Hiatus Hernia] : no hiatus hernia [Peptic Ulcer Disease] : no peptic ulcer disease [Pancreatitis] : no pancreatitis [Cholelithiasis] : no cholelithiasis [Kidney Stone] : no kidney stone [Inflammatory Bowel Disease] : no inflammatory bowel disease [Irritable Bowel Syndrome] : no irritable bowel syndrome [Alcohol Abuse] : no alcohol abuse [Malignancy] : no malignancy [Abdominal Surgery] : no abdominal surgery [Appendectomy] : no appendectomy [Cholecystectomy] : no cholecystectomy [de-identified] : 63 yo female, N/V last week, followed by diarrhea. She proceeded to [facility] ER for evaluation. Her last colonoscopy and endoscopy were with Dr. Marsh in 2020. Her colonoscopy demonstrated only diverticulosis. Her Endoscopy 3 years ago was wnl. She also states that 3 yrs ago , she had "diverticulitis, c.diff, colitis."\par \par  While in ER last week , cbc, cmp all normal; Cat scan abdomen/pelvis with possible sigmoid thickening vs underdistenstion. Last colonoscopy with Dr. Marsh 2020 demonstrated diverticulosis, no polyps.\par \par She is feeling better at present, without Nausea or vomiting or diarrhea, but has mild abdominal discomfort. [de-identified] : gastritis [de-identified] : diverticulosis

## 2022-03-09 NOTE — PHYSICAL EXAM
[Well Developed] : well developed [Well Nourished] : well nourished [No Acute Distress] : no acute distress [Normal Conjunctiva] : normal conjunctiva [Normal Venous Pressure] : normal venous pressure [Normal S1, S2] : normal S1, S2 [No Carotid Bruit] : no carotid bruit [No Murmur] : no murmur [No Rub] : no rub [No Gallop] : no gallop [Clear Lung Fields] : clear lung fields [Good Air Entry] : good air entry [No Respiratory Distress] : no respiratory distress  [Soft] : abdomen soft [No Masses/organomegaly] : no masses/organomegaly [Non Tender] : non-tender [Normal Bowel Sounds] : normal bowel sounds [Normal Gait] : normal gait [No Edema] : no edema [No Cyanosis] : no cyanosis [No Clubbing] : no clubbing [No Varicosities] : no varicosities [No Rash] : no rash [No Skin Lesions] : no skin lesions [Moves all extremities] : moves all extremities [No Focal Deficits] : no focal deficits [Normal Speech] : normal speech [Alert and Oriented] : alert and oriented [Normal memory] : normal memory

## 2022-03-09 NOTE — ASSESSMENT
[FreeTextEntry1] : 61 yo female , history of diverticulosis,gerd with recent episode of N/v/diarrhea, since improved, with normal cbc, cmp. Cat scan abdomen and pelvis unrevealing, except for sigmoid thickening vs underdistension. Last colonoscopy with Augusto Marsh MD demosntrated only diverticulosis in 2020.\par \par Plan:\par \par 1. Supportive care, BRAT diet and advance\par 2. No indication for antibiotics\par 3. Next colonoscopy in 3 years due to maternal history of colon cancer.\par 4. 4 week followup or prn as d/w pt.

## 2022-03-09 NOTE — HISTORY OF PRESENT ILLNESS
[FreeTextEntry1] : 62 year old female presents to the office with the complaint of diarrhea x 5 days. pt states she was in her USOH until 5 days prior to presentation when she began having diarrhea with associated , nausea  drying heaves and jitteriness. pt went to urgent care and then [facility] ED. Ct scan showing possible mild colitis blood work with mild hyponatremia 134 no white cont. as per pt they did not test her stools \par \par pt reports poor appetite. \par Denies any abdominal pain any fevers Denies any blood is stool

## 2022-03-14 ENCOUNTER — TRANSCRIPTION ENCOUNTER (OUTPATIENT)
Age: 62
End: 2022-03-14

## 2022-03-14 ENCOUNTER — APPOINTMENT (OUTPATIENT)
Dept: CARDIOLOGY | Facility: CLINIC | Age: 62
End: 2022-03-14

## 2022-03-28 ENCOUNTER — NON-APPOINTMENT (OUTPATIENT)
Age: 62
End: 2022-03-28

## 2022-03-30 ENCOUNTER — APPOINTMENT (OUTPATIENT)
Dept: CARDIOLOGY | Facility: CLINIC | Age: 62
End: 2022-03-30
Payer: COMMERCIAL

## 2022-03-30 ENCOUNTER — NON-APPOINTMENT (OUTPATIENT)
Age: 62
End: 2022-03-30

## 2022-03-30 VITALS
DIASTOLIC BLOOD PRESSURE: 80 MMHG | HEART RATE: 95 BPM | HEIGHT: 62 IN | OXYGEN SATURATION: 95 % | SYSTOLIC BLOOD PRESSURE: 140 MMHG | TEMPERATURE: 98.5 F | BODY MASS INDEX: 19.88 KG/M2 | WEIGHT: 108 LBS

## 2022-03-30 DIAGNOSIS — R19.7 DIARRHEA, UNSPECIFIED: ICD-10-CM

## 2022-03-30 DIAGNOSIS — R79.89 OTHER SPECIFIED ABNORMAL FINDINGS OF BLOOD CHEMISTRY: ICD-10-CM

## 2022-03-30 PROCEDURE — 93000 ELECTROCARDIOGRAM COMPLETE: CPT

## 2022-03-30 PROCEDURE — 99214 OFFICE O/P EST MOD 30 MIN: CPT

## 2022-03-30 NOTE — HISTORY OF PRESENT ILLNESS
[FreeTextEntry1] : This is a 62 year female with a Pmhx of  herniated disc, back pain who presents to the office for worsening back pain radiating down left leg \par \par pt state she had an epidural injection on Sunday with her pain management MD however reports worsening back pain. pt states shes had injections the past with good reliefs. \par pt also reports poor po intake with dry heaving\par Denies any fever at home. \par Denies any N/V/D \par \par Pt denies any CP, SOB, heart palpitations, dizziness, abdominal pain N/V/D fever or chills\par

## 2022-03-31 ENCOUNTER — APPOINTMENT (OUTPATIENT)
Dept: ORTHOPEDIC SURGERY | Facility: CLINIC | Age: 62
End: 2022-03-31
Payer: COMMERCIAL

## 2022-03-31 VITALS — HEIGHT: 62 IN | BODY MASS INDEX: 19.88 KG/M2 | WEIGHT: 108 LBS

## 2022-03-31 DIAGNOSIS — M48.07 SPINAL STENOSIS, LUMBOSACRAL REGION: ICD-10-CM

## 2022-03-31 PROCEDURE — 99205 OFFICE O/P NEW HI 60 MIN: CPT

## 2022-03-31 PROCEDURE — 72100 X-RAY EXAM L-S SPINE 2/3 VWS: CPT

## 2022-04-08 ENCOUNTER — APPOINTMENT (OUTPATIENT)
Dept: PHYSICAL MEDICINE AND REHAB | Facility: CLINIC | Age: 62
End: 2022-04-08
Payer: COMMERCIAL

## 2022-04-08 ENCOUNTER — NON-APPOINTMENT (OUTPATIENT)
Age: 62
End: 2022-04-08

## 2022-04-08 DIAGNOSIS — M79.606 PAIN IN LEG, UNSPECIFIED: ICD-10-CM

## 2022-04-08 DIAGNOSIS — M54.9 DORSALGIA, UNSPECIFIED: ICD-10-CM

## 2022-04-08 PROCEDURE — 99204 OFFICE O/P NEW MOD 45 MIN: CPT

## 2022-04-08 NOTE — ASSESSMENT
[FreeTextEntry1] : The tenderness of painful areas suggests a myofascial component to her pain syndrome.  The pain in her left buttock/thigh and leg is not explained by her recent MRI lumbar spine.\par \par Discussed nonpharmacologic alternative  to Percocet in particular engaging in a twice daily stretching program for her lower limbs and hips/pelvis as well as  periodic massage, weekly to start with and decreased frequency over time.. Long term would like to see her engage in a regular exercise program.\par \par She has tried medications as alternatives to Percocet and I don’t have any alternative medications recommendations for her.\par

## 2022-04-08 NOTE — PHYSICAL EXAM
[FreeTextEntry1] : Reflexes +2 biceps and triceps, +2-3 right knee, +2 eft knee, +2 at the ankles. Babinski is downgoing bilaterally.\par \par Flexion of lumbar spine is full, extension is mildly decreased.\par \par She is tender to palpation of the soft tissues of the posterior thigh and anterior thigh and left buttock with reproduction of her pain in these areas. [5] : C5 elbow flexion 5/5 [Normal] : normal [Able to toe walk] : the patient was able to toe walk [Able to heel walk] : the patient was able to heel walk [Intact] : all motor groups within normal limits of strength and tone bilaterally

## 2022-04-08 NOTE — HISTORY OF PRESENT ILLNESS
[FreeTextEntry1] : The patient is a 62 year old female with chronic neck and low back and lower limb pain who presents to discuss alternatives to Percocet.  She is taking the Percocet for chronic neck and low back and left lower limb pain.  She says she takes about 20 tabs a month.  She also tales gabapentin 300 mg/100 mg/200 mg for  total dose of 600 mg a day. Also takes Mobic as needed and takes "edibles" a night which help decrease her pain. She also recentlyly took a course of steroids with no impact on her pain.  She has been going to the pain clinic for epidural steroid injections and these mostly seemed to help except for the last one. She recently saw spine surgery and plans to proceed with a lumbar laminectomy. An MRI lumbar spine in December 2021 showed a synovial cyst pressing on the right L5 nerve root. MRI cervical spine from 11/2021 showed degenerative change with foraminal stenosis\par \par She does not like taking Percocet and would like to discuss alternative treatment options to this. She has a history of alcohol dependancy.\par \par She likes to golf but has been unable to recently due to the pain.  She does no exercise otherwise.

## 2022-04-21 ENCOUNTER — TRANSCRIPTION ENCOUNTER (OUTPATIENT)
Age: 62
End: 2022-04-21

## 2022-04-21 ENCOUNTER — OUTPATIENT (OUTPATIENT)
Dept: OUTPATIENT SERVICES | Facility: HOSPITAL | Age: 62
LOS: 1 days | End: 2022-04-21
Payer: COMMERCIAL

## 2022-04-21 VITALS
WEIGHT: 100.97 LBS | HEART RATE: 76 BPM | HEIGHT: 62 IN | TEMPERATURE: 99 F | SYSTOLIC BLOOD PRESSURE: 132 MMHG | RESPIRATION RATE: 16 BRPM | DIASTOLIC BLOOD PRESSURE: 85 MMHG | OXYGEN SATURATION: 100 %

## 2022-04-21 DIAGNOSIS — Z29.9 ENCOUNTER FOR PROPHYLACTIC MEASURES, UNSPECIFIED: ICD-10-CM

## 2022-04-21 DIAGNOSIS — M51.36 OTHER INTERVERTEBRAL DISC DEGENERATION, LUMBAR REGION: ICD-10-CM

## 2022-04-21 DIAGNOSIS — M48.07 SPINAL STENOSIS, LUMBOSACRAL REGION: ICD-10-CM

## 2022-04-21 DIAGNOSIS — Z91.040 LATEX ALLERGY STATUS: ICD-10-CM

## 2022-04-21 DIAGNOSIS — Z01.818 ENCOUNTER FOR OTHER PREPROCEDURAL EXAMINATION: ICD-10-CM

## 2022-04-21 DIAGNOSIS — M48.061 SPINAL STENOSIS, LUMBAR REGION WITHOUT NEUROGENIC CLAUDICATION: ICD-10-CM

## 2022-04-21 LAB
ANION GAP SERPL CALC-SCNC: 15 MMOL/L — SIGNIFICANT CHANGE UP (ref 5–17)
BLD GP AB SCN SERPL QL: NEGATIVE — SIGNIFICANT CHANGE UP
BUN SERPL-MCNC: 15 MG/DL — SIGNIFICANT CHANGE UP (ref 7–23)
CALCIUM SERPL-MCNC: 9.5 MG/DL — SIGNIFICANT CHANGE UP (ref 8.4–10.5)
CHLORIDE SERPL-SCNC: 101 MMOL/L — SIGNIFICANT CHANGE UP (ref 96–108)
CO2 SERPL-SCNC: 23 MMOL/L — SIGNIFICANT CHANGE UP (ref 22–31)
CREAT SERPL-MCNC: 0.75 MG/DL — SIGNIFICANT CHANGE UP (ref 0.5–1.3)
EGFR: 90 ML/MIN/1.73M2 — SIGNIFICANT CHANGE UP
GLUCOSE SERPL-MCNC: 90 MG/DL — SIGNIFICANT CHANGE UP (ref 70–99)
HCT VFR BLD CALC: 37.1 % — SIGNIFICANT CHANGE UP (ref 34.5–45)
HGB BLD-MCNC: 12.2 G/DL — SIGNIFICANT CHANGE UP (ref 11.5–15.5)
MCHC RBC-ENTMCNC: 31.7 PG — SIGNIFICANT CHANGE UP (ref 27–34)
MCHC RBC-ENTMCNC: 32.9 GM/DL — SIGNIFICANT CHANGE UP (ref 32–36)
MCV RBC AUTO: 96.4 FL — SIGNIFICANT CHANGE UP (ref 80–100)
MRSA PCR RESULT.: SIGNIFICANT CHANGE UP
NRBC # BLD: 0 /100 WBCS — SIGNIFICANT CHANGE UP (ref 0–0)
PLATELET # BLD AUTO: 221 K/UL — SIGNIFICANT CHANGE UP (ref 150–400)
POTASSIUM SERPL-MCNC: 4.4 MMOL/L — SIGNIFICANT CHANGE UP (ref 3.5–5.3)
POTASSIUM SERPL-SCNC: 4.4 MMOL/L — SIGNIFICANT CHANGE UP (ref 3.5–5.3)
RBC # BLD: 3.85 M/UL — SIGNIFICANT CHANGE UP (ref 3.8–5.2)
RBC # FLD: 12.7 % — SIGNIFICANT CHANGE UP (ref 10.3–14.5)
RH IG SCN BLD-IMP: POSITIVE — SIGNIFICANT CHANGE UP
S AUREUS DNA NOSE QL NAA+PROBE: SIGNIFICANT CHANGE UP
SODIUM SERPL-SCNC: 139 MMOL/L — SIGNIFICANT CHANGE UP (ref 135–145)
WBC # BLD: 8.2 K/UL — SIGNIFICANT CHANGE UP (ref 3.8–10.5)
WBC # FLD AUTO: 8.2 K/UL — SIGNIFICANT CHANGE UP (ref 3.8–10.5)

## 2022-04-21 PROCEDURE — 87641 MR-STAPH DNA AMP PROBE: CPT

## 2022-04-21 PROCEDURE — 80048 BASIC METABOLIC PNL TOTAL CA: CPT

## 2022-04-21 PROCEDURE — G0463: CPT

## 2022-04-21 PROCEDURE — 85027 COMPLETE CBC AUTOMATED: CPT

## 2022-04-21 PROCEDURE — 86850 RBC ANTIBODY SCREEN: CPT

## 2022-04-21 PROCEDURE — 86901 BLOOD TYPING SEROLOGIC RH(D): CPT

## 2022-04-21 PROCEDURE — 86900 BLOOD TYPING SEROLOGIC ABO: CPT

## 2022-04-21 PROCEDURE — 87640 STAPH A DNA AMP PROBE: CPT

## 2022-04-21 RX ORDER — CHLORHEXIDINE GLUCONATE 213 G/1000ML
1 SOLUTION TOPICAL ONCE
Refills: 0 | Status: DISCONTINUED | OUTPATIENT
Start: 2022-04-28 | End: 2022-04-28

## 2022-04-21 RX ORDER — OMEPRAZOLE 10 MG/1
1 CAPSULE, DELAYED RELEASE ORAL
Qty: 0 | Refills: 0 | DISCHARGE

## 2022-04-21 RX ORDER — ALPRAZOLAM 0.25 MG
1 TABLET ORAL
Qty: 0 | Refills: 0 | DISCHARGE

## 2022-04-21 RX ORDER — CEFAZOLIN SODIUM 1 G
2000 VIAL (EA) INJECTION ONCE
Refills: 0 | Status: DISCONTINUED | OUTPATIENT
Start: 2022-04-28 | End: 2022-04-28

## 2022-04-21 RX ORDER — SERTRALINE 25 MG/1
2 TABLET, FILM COATED ORAL
Qty: 0 | Refills: 0 | DISCHARGE

## 2022-04-21 RX ORDER — GABAPENTIN 400 MG/1
2 CAPSULE ORAL
Qty: 0 | Refills: 0 | DISCHARGE

## 2022-04-21 RX ORDER — SODIUM CHLORIDE 9 MG/ML
3 INJECTION INTRAMUSCULAR; INTRAVENOUS; SUBCUTANEOUS EVERY 8 HOURS
Refills: 0 | Status: DISCONTINUED | OUTPATIENT
Start: 2022-04-28 | End: 2022-04-28

## 2022-04-21 RX ORDER — EZETIMIBE 10 MG/1
1 TABLET ORAL
Qty: 0 | Refills: 0 | DISCHARGE

## 2022-04-21 RX ORDER — LIDOCAINE HCL 20 MG/ML
0.2 VIAL (ML) INJECTION ONCE
Refills: 0 | Status: DISCONTINUED | OUTPATIENT
Start: 2022-04-28 | End: 2022-04-28

## 2022-04-21 NOTE — H&P PST ADULT - ASSESSMENT
CAPRINI SCORE [CLOT updated 18]    AGE RELATED RISK FACTORS                                                       MOBILITY RELATED FACTORS  [ ] Age 41-60 years                                            (1 Point)                    [ ] Bed rest                                                        (1 Point)  [x ] Age: 61-74 years                                           (2 Points)                  [ ] Plaster cast                                                   (2 Points)  [ ] Age= 75 years                                              (3 Points)                    [ ] Bed bound for more than 72 hours                 (2 Points)    DISEASE RELATED RISK FACTORS                                               GENDER SPECIFIC FACTORS  [ ] Edema in the lower extremities                       (1 Point)              [ ] Pregnancy                                                     (1 Point)  [ ] Varicose veins                                               (1 Point)                     [ ] Post-partum < 6 weeks                                   (1 Point)             [ ] BMI > 25 Kg/m2                                            (1 Point)                     [ ] Hormonal therapy  or oral contraception          (1 Point)                 [ ] Sepsis (in the previous month)                        (1 Point)               [ ] History of pregnancy complications                 (1 point)  [ ] Pneumonia or serious lung disease                                               [ ] Unexplained or recurrent                     (1 Point)           (in the previous month)                               (1 Point)  [ ] Abnormal pulmonary function test                     (1 Point)                 SURGERY RELATED RISK FACTORS  [ ] Acute myocardial infarction                              (1 Point)               [ ]  Section                                             (1 Point)  [ ] Congestive heart failure (in the previous month)  (1 Point)      [ ] Minor surgery                                                  (1 Point)   [ ] Inflammatory bowel disease                             (1 Point)               [ ] Arthroscopic surgery                                        (2 Points)  [ ] Central venous access                                      (2 Points)                [x ] General surgery lasting more than 45 minutes (2 points)  [x ] Present or previous malignancy                     (2 Points)                [ ] Elective arthroplasty                                         (5 points)    [ ] Stroke (in the previous month)                          (5 Points)                                                                                                                                                           HEMATOLOGY RELATED FACTORS                                                 TRAUMA RELATED RISK FACTORS  [ ] Prior episodes of VTE                                     (3 Points)                [ ] Fracture of the hip, pelvis, or leg                       (5 Points)  [ ] Positive family history for VTE                         (3 Points)             [ ] Acute spinal cord injury (in the previous month)  (5 Points)  [ ] Prothrombin 94240 A                                     (3 Points)               [ ] Paralysis  (less than 1 month)                             (5 Points)  [ ] Factor V Leiden                                             (3 Points)                  [ ] Multiple Trauma within 1 month                        (5 Points)  [ ] Lupus anticoagulants                                     (3 Points)                                                           [ ] Anticardiolipin antibodies                               (3 Points)                                                       [ ] High homocysteine in the blood                      (3 Points)                                             [ ] Other congenital or acquired thrombophilia      (3 Points)                                                [ ] Heparin induced thrombocytopenia                  (3 Points)                                     Total Score [  5        ]

## 2022-04-21 NOTE — H&P PST ADULT - NSICDXPASTMEDICALHX_GEN_ALL_CORE_FT
PAST MEDICAL HISTORY:  Anxiety depression    Breast cancer, female Right tretaed with partial mastectomy and radition 2010    CAD (coronary atherosclerotic disease)     Diverticulitis     Hypercholesterolemia

## 2022-04-21 NOTE — H&P PST ADULT - IS PATIENT PREGNANT?
no

## 2022-04-21 NOTE — H&P PST ADULT - NSANTHOSAYNRD_GEN_A_CORE
No. ELSA screening performed.  STOP BANG Legend: 0-2 = LOW Risk; 3-4 = INTERMEDIATE Risk; 5-8 = HIGH Risk

## 2022-04-21 NOTE — H&P PST ADULT - NSICDXPASTSURGICALHX_GEN_ALL_CORE_FT
PAST SURGICAL HISTORY:  C Section 88,91,96    H/O partial mastectomy Right 2010    S/P Breast Augmentation 2000

## 2022-04-21 NOTE — H&P PST ADULT - REASON FOR ADMISSION
"I am having a posterior laminectomy "    Goal  ' I will  able to do daily activities  and exercise without any pain "    GOAL

## 2022-04-21 NOTE — H&P PST ADULT - ATTENDING COMMENTS
61 yo female with lumbar stenosis with facet cyst with left sided symptoms, temporarily improved with ANNMARIE, failed PT, NSAIDS, recommend posterior lumbar laminectomy.     I reviewed all major risks, benefits, and complications associated with lumbar laminectomy and/or microdiscectomy including but not limited to bleeding, infection, CSF leak, neurological injury, chronic pain, reherniation, hematoma worsening of pain, anesthetic risk, chronic pain and disability, need for further surgical intervention, medical complications (GI, , DVT, PE, cardiac, pulmonary, etc) and risk of mortality.

## 2022-04-21 NOTE — H&P PST ADULT - PROBLEM SELECTOR PLAN 2
L3-L5 Posterior Lumbar Laminectomy on 4/28/22.  Pre- Op Instructions discussed   labs sent  Covid test 4/25/22  pt will continue anxiety and pain meds   medical eval

## 2022-04-21 NOTE — H&P PST ADULT - HISTORY OF PRESENT ILLNESS
61 yo female PMH of Anxiety, depression HLD, GERD ,CAD. Pt  with c/o neck pain for 3 years and low back pain for the past 6 months progressively worsening over time after playing golf. Also c/o tremors on B?l UE and LE for the past  week. Neck pain radiates to RUE to elbow.  Pain radiates to posterior aspect of LLE to able. Pt denies numbness, tingling, or weakness on B/L UE or LE. Pt had several JOHNNY last injection on 07/2021,  LESI X6, last injection on 03/27/22,  with Dr Gautam (pain mgt). Presents to PST for scheduled L3-L5 Posterior Lumbar Laminectomy on 4/28/22.    Covid test 4/25/22

## 2022-04-25 ENCOUNTER — APPOINTMENT (OUTPATIENT)
Dept: GASTROENTEROLOGY | Facility: CLINIC | Age: 62
End: 2022-04-25

## 2022-04-25 ENCOUNTER — OUTPATIENT (OUTPATIENT)
Dept: OUTPATIENT SERVICES | Facility: HOSPITAL | Age: 62
LOS: 1 days | End: 2022-04-25
Payer: COMMERCIAL

## 2022-04-25 DIAGNOSIS — Z11.52 ENCOUNTER FOR SCREENING FOR COVID-19: ICD-10-CM

## 2022-04-25 PROBLEM — C50.919 MALIGNANT NEOPLASM OF UNSPECIFIED SITE OF UNSPECIFIED FEMALE BREAST: Chronic | Status: ACTIVE | Noted: 2022-04-21

## 2022-04-25 PROBLEM — I25.10 ATHEROSCLEROTIC HEART DISEASE OF NATIVE CORONARY ARTERY WITHOUT ANGINA PECTORIS: Chronic | Status: ACTIVE | Noted: 2022-04-21

## 2022-04-25 LAB — SARS-COV-2 RNA SPEC QL NAA+PROBE: SIGNIFICANT CHANGE UP

## 2022-04-25 PROCEDURE — U0003: CPT

## 2022-04-25 PROCEDURE — C9803: CPT

## 2022-04-25 PROCEDURE — U0005: CPT

## 2022-04-26 ENCOUNTER — APPOINTMENT (OUTPATIENT)
Dept: CARDIOLOGY | Facility: CLINIC | Age: 62
End: 2022-04-26
Payer: COMMERCIAL

## 2022-04-26 VITALS
HEART RATE: 81 BPM | BODY MASS INDEX: 18.58 KG/M2 | DIASTOLIC BLOOD PRESSURE: 68 MMHG | SYSTOLIC BLOOD PRESSURE: 130 MMHG | TEMPERATURE: 98.1 F | WEIGHT: 101 LBS | HEIGHT: 62 IN | OXYGEN SATURATION: 97 %

## 2022-04-26 DIAGNOSIS — Z01.818 ENCOUNTER FOR OTHER PREPROCEDURAL EXAMINATION: ICD-10-CM

## 2022-04-26 PROCEDURE — 99214 OFFICE O/P EST MOD 30 MIN: CPT

## 2022-04-26 NOTE — PHYSICAL EXAM
[General Appearance - Well Developed] : well developed [Normal Appearance] : normal appearance [Well Groomed] : well groomed [General Appearance - Well Nourished] : well nourished [No Deformities] : no deformities [General Appearance - In No Acute Distress] : no acute distress [Normal Conjunctiva] : the conjunctiva exhibited no abnormalities [Eyelids - No Xanthelasma] : the eyelids demonstrated no xanthelasmas [Normal Oral Mucosa] : normal oral mucosa [No Oral Pallor] : no oral pallor [No Oral Cyanosis] : no oral cyanosis [Normal Jugular Venous A Waves Present] : normal jugular venous A waves present [Normal Jugular Venous V Waves Present] : normal jugular venous V waves present [No Jugular Venous Melton A Waves] : no jugular venous melton A waves [Respiration, Rhythm And Depth] : normal respiratory rhythm and effort [Exaggerated Use Of Accessory Muscles For Inspiration] : no accessory muscle use [Auscultation Breath Sounds / Voice Sounds] : lungs were clear to auscultation bilaterally [Heart Rate And Rhythm] : heart rate and rhythm were normal [Heart Sounds] : normal S1 and S2 [Murmurs] : no murmurs present [Abdomen Soft] : soft [Abdomen Tenderness] : non-tender [Abdomen Mass (___ Cm)] : no abdominal mass palpated [Abnormal Walk] : normal gait [Gait - Sufficient For Exercise Testing] : the gait was sufficient for exercise testing [Nail Clubbing] : no clubbing of the fingernails [Cyanosis, Localized] : no localized cyanosis [Petechial Hemorrhages (___cm)] : no petechial hemorrhages [Skin Color & Pigmentation] : normal skin color and pigmentation [] : no rash [No Venous Stasis] : no venous stasis [Skin Lesions] : no skin lesions [No Skin Ulcers] : no skin ulcer [No Xanthoma] : no  xanthoma was observed [Oriented To Time, Place, And Person] : oriented to person, place, and time [Affect] : the affect was normal [Mood] : the mood was normal [No Anxiety] : not feeling anxious

## 2022-04-26 NOTE — HISTORY OF PRESENT ILLNESS
[Preoperative Visit] : for a medical evaluation prior to surgery [Scheduled Procedure ___] : a [unfilled] [Date of Surgery ___] : on [unfilled] [Surgeon Name ___] : surgeon: [unfilled] [Good] : Good [Prior Anesthesia] : Prior anesthesia [Electrocardiogram] : ~T an ECG ~C was performed [Fever] : no fever [Chills] : no chills [Fatigue] : no fatigue [Chest Pain] : no chest pain [Cough] : no cough [Dyspnea] : no dyspnea [Dysuria] : no dysuria [Urinary Frequency] : no urinary frequency [Nausea] : no nausea [Vomiting] : no vomiting [Diarrhea] : no diarrhea [Abdominal Pain] : no abdominal pain [Easy Bruising] : no easy bruising [Lower Extremity Swelling] : no lower extremity swelling [Poor Exercise Tolerance] : no poor exercise tolerance [Diabetes] : no diabetes [Cardiovascular Disease] : no cardiovascular disease [Pulmonary Disease] : no pulmonary disease [Anti-Platelet Agents] : no anti-platelet agents [Nicotine Dependence] : no nicotine dependence [Alcohol Use] : no  alcohol use [Renal Disease] : no renal disease [GI Disease] : no gastrointestinal disease [Sleep Apnea] : no sleep apnea [Thromboembolic Problems] : no thromboembolic problems [Clotting Disorder] : no clotting disorder [Frequent use of NSAIDs] : no use of NSAIDs [Bleeding Disorder] : no bleeding disorder [Transfusion Reaction] : no transfusion reaction [Impaired Immunity] : no impaired immunity [Frequent Aspirin Use] : no frequent aspirin use [Prev Anesthesia Reaction] : no previous anesthesia reaction [de-identified] : Dr. Morgan [FreeTextEntry1] : This is a 62 year female with a Pmhx of herniated disc, who presents to the office for medical clearance for surgery. pt is scheduled for Posterior lumbar laminectomy L3-L5 on 4/28/22 with Dr Morgan \par \par pt report feeling well continues to have pain down left leg \par \par Pt denies any CP, SOB, heart palpitations, dizziness, abdominal pain N/V/D fever or chills\par

## 2022-04-27 ENCOUNTER — TRANSCRIPTION ENCOUNTER (OUTPATIENT)
Age: 62
End: 2022-04-27

## 2022-04-28 ENCOUNTER — RESULT REVIEW (OUTPATIENT)
Age: 62
End: 2022-04-28

## 2022-04-28 ENCOUNTER — TRANSCRIPTION ENCOUNTER (OUTPATIENT)
Age: 62
End: 2022-04-28

## 2022-04-28 ENCOUNTER — OUTPATIENT (OUTPATIENT)
Dept: INPATIENT UNIT | Facility: HOSPITAL | Age: 62
LOS: 1 days | End: 2022-04-28
Payer: COMMERCIAL

## 2022-04-28 ENCOUNTER — APPOINTMENT (OUTPATIENT)
Dept: ORTHOPEDIC SURGERY | Facility: HOSPITAL | Age: 62
End: 2022-04-28

## 2022-04-28 VITALS
OXYGEN SATURATION: 98 % | HEART RATE: 98 BPM | HEIGHT: 62 IN | SYSTOLIC BLOOD PRESSURE: 129 MMHG | DIASTOLIC BLOOD PRESSURE: 85 MMHG | TEMPERATURE: 98 F | WEIGHT: 100.97 LBS | RESPIRATION RATE: 16 BRPM

## 2022-04-28 VITALS
SYSTOLIC BLOOD PRESSURE: 105 MMHG | DIASTOLIC BLOOD PRESSURE: 59 MMHG | HEART RATE: 84 BPM | OXYGEN SATURATION: 96 % | RESPIRATION RATE: 16 BRPM

## 2022-04-28 DIAGNOSIS — M51.36 OTHER INTERVERTEBRAL DISC DEGENERATION, LUMBAR REGION: ICD-10-CM

## 2022-04-28 DIAGNOSIS — M48.07 SPINAL STENOSIS, LUMBOSACRAL REGION: ICD-10-CM

## 2022-04-28 PROCEDURE — 88304 TISSUE EXAM BY PATHOLOGIST: CPT | Mod: 26

## 2022-04-28 PROCEDURE — 63047 LAM FACETEC & FORAMOT LUMBAR: CPT

## 2022-04-28 PROCEDURE — C1889: CPT

## 2022-04-28 PROCEDURE — C9399: CPT

## 2022-04-28 PROCEDURE — 63048 LAM FACETEC &FORAMOT EA ADDL: CPT

## 2022-04-28 PROCEDURE — 72020 X-RAY EXAM OF SPINE 1 VIEW: CPT

## 2022-04-28 PROCEDURE — 72020 X-RAY EXAM OF SPINE 1 VIEW: CPT | Mod: 26

## 2022-04-28 PROCEDURE — 97162 PT EVAL MOD COMPLEX 30 MIN: CPT

## 2022-04-28 PROCEDURE — 88304 TISSUE EXAM BY PATHOLOGIST: CPT

## 2022-04-28 PROCEDURE — 97165 OT EVAL LOW COMPLEX 30 MIN: CPT

## 2022-04-28 DEVICE — SURGIFLO MATRIX WITH THROMBIN KIT: Type: IMPLANTABLE DEVICE | Status: FUNCTIONAL

## 2022-04-28 DEVICE — SURGIFOAM PAD 8CM X 12.5CM X 10MM (100): Type: IMPLANTABLE DEVICE | Status: FUNCTIONAL

## 2022-04-28 RX ORDER — SENNA PLUS 8.6 MG/1
2 TABLET ORAL AT BEDTIME
Refills: 0 | Status: DISCONTINUED | OUTPATIENT
Start: 2022-04-28 | End: 2022-04-28

## 2022-04-28 RX ORDER — SERTRALINE 25 MG/1
200 TABLET, FILM COATED ORAL DAILY
Refills: 0 | Status: DISCONTINUED | OUTPATIENT
Start: 2022-04-28 | End: 2022-04-28

## 2022-04-28 RX ORDER — GABAPENTIN 400 MG/1
200 CAPSULE ORAL
Refills: 0 | Status: DISCONTINUED | OUTPATIENT
Start: 2022-04-28 | End: 2022-04-28

## 2022-04-28 RX ORDER — PETROLATUM,WHITE
1 JELLY (GRAM) TOPICAL ONCE
Refills: 0 | Status: COMPLETED | OUTPATIENT
Start: 2022-04-28 | End: 2022-04-28

## 2022-04-28 RX ORDER — ACETAMINOPHEN 500 MG
1000 TABLET ORAL ONCE
Refills: 0 | Status: DISCONTINUED | OUTPATIENT
Start: 2022-04-28 | End: 2022-04-28

## 2022-04-28 RX ORDER — ATORVASTATIN CALCIUM 80 MG/1
40 TABLET, FILM COATED ORAL AT BEDTIME
Refills: 0 | Status: DISCONTINUED | OUTPATIENT
Start: 2022-04-28 | End: 2022-04-28

## 2022-04-28 RX ORDER — CYCLOBENZAPRINE HYDROCHLORIDE 10 MG/1
1 TABLET, FILM COATED ORAL
Qty: 15 | Refills: 0
Start: 2022-04-28 | End: 2022-05-02

## 2022-04-28 RX ORDER — DEXAMETHASONE 0.5 MG/5ML
37 ELIXIR ORAL
Qty: 37 | Refills: 0
Start: 2022-04-28 | End: 2022-04-30

## 2022-04-28 RX ORDER — PANTOPRAZOLE SODIUM 20 MG/1
40 TABLET, DELAYED RELEASE ORAL
Refills: 0 | Status: DISCONTINUED | OUTPATIENT
Start: 2022-04-28 | End: 2022-04-28

## 2022-04-28 RX ORDER — FOLIC ACID 0.8 MG
1 TABLET ORAL DAILY
Refills: 0 | Status: DISCONTINUED | OUTPATIENT
Start: 2022-04-28 | End: 2022-04-28

## 2022-04-28 RX ORDER — ASCORBIC ACID 60 MG
500 TABLET,CHEWABLE ORAL
Refills: 0 | Status: DISCONTINUED | OUTPATIENT
Start: 2022-04-28 | End: 2022-04-28

## 2022-04-28 RX ORDER — HYDROMORPHONE HYDROCHLORIDE 2 MG/ML
0.25 INJECTION INTRAMUSCULAR; INTRAVENOUS; SUBCUTANEOUS
Refills: 0 | Status: DISCONTINUED | OUTPATIENT
Start: 2022-04-28 | End: 2022-04-28

## 2022-04-28 RX ORDER — TRAMADOL HYDROCHLORIDE 50 MG/1
50 TABLET ORAL EVERY 6 HOURS
Refills: 0 | Status: DISCONTINUED | OUTPATIENT
Start: 2022-04-28 | End: 2022-04-28

## 2022-04-28 RX ORDER — HYDROMORPHONE HYDROCHLORIDE 2 MG/ML
0.5 INJECTION INTRAMUSCULAR; INTRAVENOUS; SUBCUTANEOUS
Refills: 0 | Status: DISCONTINUED | OUTPATIENT
Start: 2022-04-28 | End: 2022-04-28

## 2022-04-28 RX ORDER — ONDANSETRON 8 MG/1
4 TABLET, FILM COATED ORAL ONCE
Refills: 0 | Status: DISCONTINUED | OUTPATIENT
Start: 2022-04-28 | End: 2022-04-28

## 2022-04-28 RX ORDER — ONDANSETRON 8 MG/1
1 TABLET, FILM COATED ORAL
Qty: 28 | Refills: 0
Start: 2022-04-28 | End: 2022-05-04

## 2022-04-28 RX ORDER — DEXAMETHASONE 0.5 MG/5ML
ELIXIR ORAL
Refills: 0 | Status: DISCONTINUED | OUTPATIENT
Start: 2022-04-28 | End: 2022-04-28

## 2022-04-28 RX ORDER — OXYCODONE HYDROCHLORIDE 5 MG/1
10 TABLET ORAL
Refills: 0 | Status: DISCONTINUED | OUTPATIENT
Start: 2022-04-28 | End: 2022-04-28

## 2022-04-28 RX ORDER — MAGNESIUM HYDROXIDE 400 MG/1
30 TABLET, CHEWABLE ORAL EVERY 12 HOURS
Refills: 0 | Status: DISCONTINUED | OUTPATIENT
Start: 2022-04-28 | End: 2022-04-28

## 2022-04-28 RX ORDER — DEXAMETHASONE 0.5 MG/5ML
1 ELIXIR ORAL EVERY 6 HOURS
Refills: 0 | Status: CANCELLED | OUTPATIENT
Start: 2022-04-30 | End: 2022-04-28

## 2022-04-28 RX ORDER — ONDANSETRON 8 MG/1
4 TABLET, FILM COATED ORAL EVERY 6 HOURS
Refills: 0 | Status: DISCONTINUED | OUTPATIENT
Start: 2022-04-28 | End: 2022-04-28

## 2022-04-28 RX ORDER — OXYCODONE HYDROCHLORIDE 5 MG/1
5 TABLET ORAL
Refills: 0 | Status: DISCONTINUED | OUTPATIENT
Start: 2022-04-28 | End: 2022-04-28

## 2022-04-28 RX ORDER — ALPRAZOLAM 0.25 MG
0.5 TABLET ORAL
Refills: 0 | Status: DISCONTINUED | OUTPATIENT
Start: 2022-04-28 | End: 2022-04-28

## 2022-04-28 RX ORDER — CEFAZOLIN SODIUM 1 G
2000 VIAL (EA) INJECTION EVERY 8 HOURS
Refills: 0 | Status: DISCONTINUED | OUTPATIENT
Start: 2022-04-28 | End: 2022-04-28

## 2022-04-28 RX ORDER — ACETAMINOPHEN 500 MG
975 TABLET ORAL EVERY 8 HOURS
Refills: 0 | Status: DISCONTINUED | OUTPATIENT
Start: 2022-04-28 | End: 2022-04-28

## 2022-04-28 RX ORDER — OXYCODONE HYDROCHLORIDE 5 MG/1
1 TABLET ORAL
Qty: 28 | Refills: 0
Start: 2022-04-28 | End: 2022-05-04

## 2022-04-28 RX ORDER — SODIUM CHLORIDE 9 MG/ML
1000 INJECTION, SOLUTION INTRAVENOUS
Refills: 0 | Status: DISCONTINUED | OUTPATIENT
Start: 2022-04-29 | End: 2022-04-28

## 2022-04-28 RX ORDER — DOCUSATE SODIUM 100 MG
1 CAPSULE ORAL
Qty: 14 | Refills: 0
Start: 2022-04-28 | End: 2022-05-04

## 2022-04-28 RX ORDER — DEXAMETHASONE 0.5 MG/5ML
3 ELIXIR ORAL EVERY 6 HOURS
Refills: 0 | Status: DISCONTINUED | OUTPATIENT
Start: 2022-04-29 | End: 2022-04-28

## 2022-04-28 RX ORDER — DEXAMETHASONE 0.5 MG/5ML
5 ELIXIR ORAL EVERY 6 HOURS
Refills: 0 | Status: DISCONTINUED | OUTPATIENT
Start: 2022-04-28 | End: 2022-04-28

## 2022-04-28 RX ORDER — CYCLOBENZAPRINE HYDROCHLORIDE 10 MG/1
10 TABLET, FILM COATED ORAL EVERY 8 HOURS
Refills: 0 | Status: DISCONTINUED | OUTPATIENT
Start: 2022-04-28 | End: 2022-04-28

## 2022-04-28 RX ORDER — ACETAMINOPHEN 500 MG
1 TABLET ORAL
Qty: 42 | Refills: 0
Start: 2022-04-28 | End: 2022-05-11

## 2022-04-28 RX ADMIN — Medication 1 APPLICATION(S): at 13:35

## 2022-04-28 RX ADMIN — HYDROMORPHONE HYDROCHLORIDE 0.5 MILLIGRAM(S): 2 INJECTION INTRAMUSCULAR; INTRAVENOUS; SUBCUTANEOUS at 12:24

## 2022-04-28 RX ADMIN — Medication 1 TABLET(S): at 13:24

## 2022-04-28 RX ADMIN — CYCLOBENZAPRINE HYDROCHLORIDE 10 MILLIGRAM(S): 10 TABLET, FILM COATED ORAL at 14:15

## 2022-04-28 RX ADMIN — Medication 1 MILLIGRAM(S): at 13:24

## 2022-04-28 RX ADMIN — HYDROMORPHONE HYDROCHLORIDE 0.5 MILLIGRAM(S): 2 INJECTION INTRAMUSCULAR; INTRAVENOUS; SUBCUTANEOUS at 12:04

## 2022-04-28 RX ADMIN — Medication 100 MILLIGRAM(S): at 15:22

## 2022-04-28 RX ADMIN — Medication 1 TABLET(S): at 14:15

## 2022-04-28 RX ADMIN — Medication 975 MILLIGRAM(S): at 14:14

## 2022-04-28 NOTE — ASU DISCHARGE PLAN (ADULT/PEDIATRIC) - CARE PROVIDER_API CALL
Scott Morgan)  Orthopedics  [address]  [place] [address]  Phone: [phone]  Fax: [phone]  Follow Up Time:

## 2022-04-28 NOTE — BRIEF OPERATIVE NOTE - OPERATION/FINDINGS
[facility]    [address] [place] [address]    Phone:  [phone]    Fax:  [phone]       Thank You for choosing us for your health care visit. We are glad to serve you and happy to provide you with this summary of your visit. Please help us to ensure we have accurate records. If you find anything that needs to be changed, please let our staff know as soon as possible.          Your Demographic Information     Patient Name Sex Bar Goddard Male 1954       Ethnic Group Patient Race    Not of  or  Origin White      Your Visit Details     Date & Time Provider Department    2017 8:30 AM Sanjeev Cadena DO [facility]      Your Upcoming Appointment*(Max 10)       9:10 AM CST   Lab Visit with [facility]   [facility] ([facility])    [address] [place] [address]   [phone]              9:00 AM CST   Routine Eye Exam with Coy Bay MD   [facility] [place] ([facility])    [address]  [facility] [address]   [phone]            2018  7:50 AM CST   Fasting Lab with [facility]   [facility] ([facility])    [address] [place] [address]   [phone]            2018  8:30 AM CST   Complete Physical Exam with Sanjeev Cadena DO   [facility] ([facility])    [address] [place] [address]   [phone]              Your To Do List     Future Orders Please Complete On or Around Expires    URINALYSIS & REFLEX MICRO WITH CULTURE IF INDICATED  2017 Mar 04, 2017    Follow-Up    Return in about 1 year (around 2018) for Yearly Physical.      We Ordered or Performed the Following     LIPID PANEL WITH  REFLEX       Conditions Discussed Today or Order-Related Diagnoses        Comments    Annual physical exam    -  Primary     Pure hypercholesterolemia           Your Vitals Were     BP Pulse Temp Height Weight BMI    126/62 (BP Location: [facility], Patient Position: Sitting, Cuff Size: Regular) 56 97.2 °F (36.2 °C) (Tympanic) 5' 7\" (1.702 m) 160 lb 0.9 oz (72.6 kg) 25.07 kg/m2    Smoking Status                   Never Smoker           Medications Prescribed or Re-Ordered Today     None      Your Current Medications Are        Disp Refills Start End    LUMIGAN 0.01 % Solution 7.5 mL 2 1/10/2017     Sig: PLACE ONE DROP IN BOTH EYES EVERY EVENING    Class: Eprescribe    Notes to Pharmacy: PLEASE REAUTHORIZE -PT HAS REQUESTED REFILL    Glucosamine-Chondroitin (GLUCOSAMINE CHONDR COMPLEX PO)        Sig - Route: Take 1 tablet by mouth daily. - Oral    Class: Historical Med    Omega-3 Fatty Acids (FISH OIL PO)        Sig - Route: Take 1 capsule by mouth daily. - Oral    Class: Historical Med    Dispense (CHECK, UNKNOWN CONCENTRATION)        Sig - Route: Take 1,000 Units by mouth daily. Vitamin D - Oral    Class: Historical Med    Daily Multiple Vitamins TABS        Sig - Route: Take  by mouth daily. - Oral    Class: Historical Med      Allergies     Avelox [Moxifloxacin Hydrochloride] HIVES    In 2008- 30 min later had hives      Immunizations History as of 2/2/2017     Name Date    Tdap 2/19/2010      Problem List as of 2/2/2017     Borderline glaucoma with ocular hypertension    POAG (primary open-angle glaucoma)    Cataract    PDS (pigmentary dispersion syndrome)    Myopia of both eyes    Pure hypercholesterolemia    Pseudophakia    OAG (open angle glaucoma)    Posterior capsular opacification visually significant            Patient Instructions     None       as above, see op report

## 2022-04-28 NOTE — PHYSICAL THERAPY INITIAL EVALUATION ADULT - ADDITIONAL COMMENTS
Prior to admission pt reports being independent of all ADL's & functional mobility without AD. Pt resides with spouse in house, 1 step to enter, 1 flight to bedroom. Pt able to stay on first floor if needed. +walk in shower.

## 2022-04-28 NOTE — PHYSICAL THERAPY INITIAL EVALUATION ADULT - PRECAUTIONS/LIMITATIONS, REHAB EVAL
CONT: tingling, or weakness on B/L UE or LE. Pt had several JOHNNY last injection on 07/2021,  LESI X6, last injection on 03/27/22,  with Dr Gautam (pain mgt). Presents to PST for scheduled L3-L5 Posterior Lumbar Laminectomy on 4/28/22./fall precautions/spinal precautions/surgical precautions

## 2022-04-28 NOTE — OCCUPATIONAL THERAPY INITIAL EVALUATION ADULT - VISUAL ACUITY
Writer met with patient in order to orient patient to unit, provide patient with a unit schedule, introduce patient to psychiatric rehabilitation staff and department functions. Patient was brought to the hospital by EMS/[employer] after she was allegedly arrested for trespassing. Patient is confused and manic, with por judgment and inability to care for herself. Patient resides at home with family. She is a graduate of [employer] and is self-employed, according to medical records.   Writer and patient were unable to establish a collaborative psychiatric rehabilitation goal, therefore one will be chosen for her based on patient's admitting circumstances, chief complaints, and needs identified by other members of her treatment team. Writer will discuss with patient current protocol in response to COVID-19, such as the importance of daily hygiene, hand-washing, and the function and importance of the mask mandate and appropriate social distancing when patient is more receptive.    Patient was brought to the hospital by EMS/[employer] after she was allegedly arrested for trespassing. Patient is confused and manic, with por judgment and inability to care for herself. Patient resides at home with family. She is a graduate of [employer] and is self-employed, according to medical records.  Writer met with patient in order to orient patient to unit, provide patient with a unit schedule, introduce patient to psychiatric rehabilitation staff and department functions. Patient was brought to the hospital by EMS/[employer] after she was allegedly arrested for trespassing. Patient was reported to be  confused and manic, with poor judgment and inability to care for herself, however, on approach, patient was calm and cooperative. Patient resides at home with family. She is a graduate of [employer] and is currently enrolled in online graduate classes in Anthropology. Patient is employed as a caretaker, according to medical records.   Writer and patient were able to establish a collaborative psychiatric rehabilitation goal. Writer discussed with patient current protocol in response to COVID-19, such as the importance of daily hygiene, hand-washing, and the function and importance of the mask mandate and appropriate social distancing.   Pt wears glasses/not tested

## 2022-04-28 NOTE — OCCUPATIONAL THERAPY INITIAL EVALUATION ADULT - PERTINENT HX OF CURRENT PROBLEM, REHAB EVAL
61 yo female PMH of Anxiety, depression HLD, GERD ,CAD. Pt  with c/o neck pain for 3 years and low back pain for the past 6 months progressively worsening over time after playing golf. Also c/o tremors on B?l UE and LE for the past  week. Neck pain radiates to RUE to elbow.  Pain radiates to posterior aspect of LLE to able. Pt denies numbness, tingling, or weakness on B/L UE or LE. Pt s/p L3-L5 Posterior Lumbar Laminectomy on 4/28/22

## 2022-04-28 NOTE — PRE-ANESTHESIA EVALUATION ADULT - NSANTHVITALSIGNSFT_GEN_ALL_CORE
Vital Signs Last 24 Hrs  T(C): 36.6 (28 Apr 2022 05:42), Max: 36.6 (28 Apr 2022 05:42)  T(F): 97.9 (28 Apr 2022 05:42), Max: 97.9 (28 Apr 2022 05:42)  HR: 98 (28 Apr 2022 05:42) (98 - 98)  BP: 129/85 (28 Apr 2022 05:42) (129/85 - 129/85)  BP(mean): --  RR: 16 (28 Apr 2022 05:42) (16 - 16)  SpO2: 98% (28 Apr 2022 05:42) (98% - 98%)

## 2022-04-28 NOTE — PHYSICAL THERAPY INITIAL EVALUATION ADULT - PERTINENT HX OF CURRENT PROBLEM, REHAB EVAL
63 yo female PMH of Anxiety, depression HLD, GERD ,CAD. Pt  with c/o neck pain for 3 years and low back pain for the past 6 months progressively worsening over time after playing golf. Also c/o tremors on B?l UE and LE for the past  week. Neck pain radiates to RUE to elbow.  Pain radiates to posterior aspect of LLE to able. Pt denies numbness,

## 2022-04-28 NOTE — ASU DISCHARGE PLAN (ADULT/PEDIATRIC) - HAVE YOU HAD A FIRST COVID-19 BOOSTER?
Problem: Inpatient SLP  Goal: Dysphagia- Patient will safely consume diet as per recommendation with no signs/symptoms of aspiration  Outcome: Unable to achieve outcome(s) by discharge Date Met: 18 1111 18 0822   Safely Consume Diet   Safely Consume Diet- SLP, Date Established 17 --    Safely Consume Diet- SLP, Time to Achieve by discharge --    Safely Consume Diet- SLP, Activity Level Patient will improve oral skills for more efficient eating;Patient will improve timing of pharyngeal response for safer, more efficient swallow;Patient will improve laryngeal elevation for safer, more efficient swallow;Patient will improve tongue base/pharyngeal wall squeeze for safer, more efficient swallow --    Safely Consume Diet- SLP, Additional Goal Patient will tolerate PO trials of upgrde consistency with SLP with no overt s/s of aspiration. --    Safely Consume Diet- SLP, Date Goal Reviewed 17 --    Safely Consume Diet- SLP, Outcome --  goal not met   Safely Consume Diet- SLP, Reason Goal Not Met --  medical status inhibits participation;other (see comments)  (Pt )          Yes

## 2022-04-28 NOTE — OCCUPATIONAL THERAPY INITIAL EVALUATION ADULT - PATIENT/FAMILY/SIGNIFICANT OTHER GOALS STATEMENT, OT EVAL
"I will  able to do daily activities  and exercise without any pain " "I will able to do daily activities  and exercise without any pain "

## 2022-04-28 NOTE — ASU DISCHARGE PLAN (ADULT/PEDIATRIC) - NS MD DC FALL RISK RISK
For information on Fall & Injury Prevention, visit: [email or web address] OR  [email or web address] OR  https://www.cdc.gov/steadi/patient.html

## 2022-04-28 NOTE — CHART NOTE - NSCHARTNOTEFT_GEN_A_CORE
Patient Resting without complaints.  No Chest Pain, SOB, N/V.  Pre op s/sx: LBP and BLE radiculopathy R>L; Post op, patient reports: Improved Sx and 5/5 BLE.  T(C): 36.2 (04-28-22 @ 11:35), Max: 36.6 (04-28-22 @ 05:42)  HR: 90 (04-28-22 @ 12:30) (90 - 98)  BP: 100/59 (04-28-22 @ 12:30) (100/59 - 132/61)  RR: 15 (04-28-22 @ 12:30) (15 - 17)  SpO2: 96% (04-28-22 @ 12:30) (96% - 100%)  Wt(kg): --  Exam:   Alert/Oriented, No Acute Distress  Cards: +S1/S2, RRR  Pulm: CTAB           Dressing: Gauze and surgical film c/d/i         Sensation: intact to light touch throughout BLE:          Motor exam: [x]                         Lower extremity           PF          DF         EHL       FHL                                                                              R        5/5        5/5        5/5       5/5                                          L         5/5        5/5        5/5       5/5                                                                  Calves Soft/Non-tender bilaterally            warm well perfused; capillary refill <3 seconds              LABS:              A/P :  62y Female s/p L3-L5 Laminectomy; VSS; NAD  -    Pain control  -    DVT ppx: SCDs, Early ambulation       -    Physical Therapy  -    Weight bearing status: WBAT   -    Dispo plan to home after 1 dose post operative abx administered an PACU protocol for discharge met.      Karel Elizondo PA-C  Orthopedic Surgery Team  Team Pager #[phone]

## 2022-04-28 NOTE — PRE-ANESTHESIA EVALUATION ADULT - NSANTHPMHFT_GEN_ALL_CORE
62F pmh: Chronic Back and Neck pain w' neuropathy , GERD, anxiety/Depression, Breast Ca s/p partial mastectomy , current smokers, EF=65, Allergies=latex

## 2022-04-28 NOTE — PHYSICAL THERAPY INITIAL EVALUATION ADULT - DISCHARGE DISPOSITION, PT EVAL
Pt cleared for D/C from PT perspective, can D/C home once medically cleared./home/no skilled PT needs

## 2022-05-03 ENCOUNTER — APPOINTMENT (OUTPATIENT)
Dept: ORTHOPEDIC SURGERY | Facility: CLINIC | Age: 62
End: 2022-05-03

## 2022-05-03 DIAGNOSIS — M54.50 LOW BACK PAIN, UNSPECIFIED: ICD-10-CM

## 2022-05-09 ENCOUNTER — TRANSCRIPTION ENCOUNTER (OUTPATIENT)
Age: 62
End: 2022-05-09

## 2022-05-09 LAB — SURGICAL PATHOLOGY STUDY: SIGNIFICANT CHANGE UP

## 2022-05-31 ENCOUNTER — APPOINTMENT (OUTPATIENT)
Dept: ORTHOPEDIC SURGERY | Facility: CLINIC | Age: 62
End: 2022-05-31
Payer: COMMERCIAL

## 2022-05-31 VITALS — BODY MASS INDEX: 18.58 KG/M2 | HEIGHT: 62 IN | WEIGHT: 101 LBS

## 2022-05-31 PROCEDURE — 99024 POSTOP FOLLOW-UP VISIT: CPT

## 2022-05-31 NOTE — HISTORY OF PRESENT ILLNESS
[___ Weeks Post Op] : [unfilled] weeks post op [Neuro Intact] : an unremarkable neurological exam [Doing Well] : is doing well [Excellent Pain Control] : has excellent pain control [No ADLs] : to avoid most activities of daily living [No Work] : not to work [No Housework] : not to do housework [Chills] : no chills [Fever] : no fever [Discharge] : absent of discharge [Dehiscence] : not dehisced [de-identified] : s/p lumbar laminectomy L3-5 on 04/28/22 [de-identified] : improved preop symtpoms.  surgical and neck pain. Pt takes Oxycodone 5mg at bedtime, Tylenol, Gabapentin 100mg in am and 200mg at bedtime and Cyclobenzaprine BID. Pt walks without assistance. Symptoms greatly improved since surgery.\par  [de-identified] : PT rx, continue current pain meds, PT.

## 2022-06-09 ENCOUNTER — RX RENEWAL (OUTPATIENT)
Age: 62
End: 2022-06-09

## 2022-06-09 ENCOUNTER — TRANSCRIPTION ENCOUNTER (OUTPATIENT)
Age: 62
End: 2022-06-09

## 2022-07-12 NOTE — ED ADULT NURSE NOTE - NSFALLRSKOUTCOME_ED_ALL_ED
Universal Safety Interventions Airway patent, Nasal mucosa clear. Mouth with normal mucosa. Throat has no vesicles, no oropharyngeal exudates and uvula is midline.

## 2022-07-20 ENCOUNTER — APPOINTMENT (OUTPATIENT)
Dept: ORTHOPEDIC SURGERY | Facility: CLINIC | Age: 62
End: 2022-07-20

## 2022-07-20 VITALS
HEIGHT: 62 IN | WEIGHT: 105 LBS | BODY MASS INDEX: 19.32 KG/M2 | HEART RATE: 99 BPM | SYSTOLIC BLOOD PRESSURE: 137 MMHG | DIASTOLIC BLOOD PRESSURE: 79 MMHG

## 2022-07-20 DIAGNOSIS — M51.36 OTHER INTERVERTEBRAL DISC DEGENERATION, LUMBAR REGION: ICD-10-CM

## 2022-07-20 PROCEDURE — 99024 POSTOP FOLLOW-UP VISIT: CPT

## 2022-07-20 RX ORDER — CYCLOBENZAPRINE HYDROCHLORIDE 10 MG/1
10 TABLET, FILM COATED ORAL
Qty: 15 | Refills: 0 | Status: DISCONTINUED | COMMUNITY
Start: 2022-04-28

## 2022-07-20 RX ORDER — ACETAMINOPHEN EXTRA STRENGTH 500 MG/1
500 TABLET ORAL
Qty: 42 | Refills: 0 | Status: DISCONTINUED | COMMUNITY
Start: 2022-04-28

## 2022-07-20 RX ORDER — OXYCODONE 5 MG/1
5 TABLET ORAL
Qty: 28 | Refills: 0 | Status: DISCONTINUED | COMMUNITY
Start: 2022-04-28

## 2022-07-20 RX ORDER — DEXAMETHASONE 1 MG/1
1 TABLET ORAL
Qty: 37 | Refills: 0 | Status: DISCONTINUED | COMMUNITY
Start: 2022-04-28

## 2022-07-20 RX ORDER — AZITHROMYCIN 250 MG/1
250 TABLET, FILM COATED ORAL
Qty: 6 | Refills: 0 | Status: DISCONTINUED | COMMUNITY
Start: 2022-03-06

## 2022-07-20 RX ORDER — BACLOFEN 10 MG/1
10 TABLET ORAL
Qty: 90 | Refills: 0 | Status: DISCONTINUED | COMMUNITY
Start: 2022-01-31

## 2022-07-20 RX ORDER — DOCUSATE SODIUM 100 MG/1
100 CAPSULE, LIQUID FILLED ORAL
Qty: 14 | Refills: 0 | Status: DISCONTINUED | COMMUNITY
Start: 2022-04-28

## 2022-07-20 RX ORDER — ONDANSETRON 4 MG/1
4 TABLET, ORALLY DISINTEGRATING ORAL
Qty: 21 | Refills: 0 | Status: DISCONTINUED | COMMUNITY
Start: 2022-03-06

## 2022-07-20 RX ORDER — CIPROFLOXACIN AND DEXAMETHASONE 3; 1 MG/ML; MG/ML
0.3-0.1 SUSPENSION/ DROPS AURICULAR (OTIC)
Qty: 8 | Refills: 0 | Status: DISCONTINUED | COMMUNITY
Start: 2022-07-12

## 2022-07-20 RX ORDER — OXYCODONE AND ACETAMINOPHEN 7.5; 325 MG/1; MG/1
7.5-325 TABLET ORAL
Qty: 60 | Refills: 0 | Status: DISCONTINUED | COMMUNITY
Start: 2022-01-31

## 2022-08-05 ENCOUNTER — APPOINTMENT (OUTPATIENT)
Dept: ORTHOPEDIC SURGERY | Facility: CLINIC | Age: 62
End: 2022-08-05

## 2022-08-05 VITALS — HEIGHT: 62 IN | BODY MASS INDEX: 19.32 KG/M2 | WEIGHT: 105 LBS

## 2022-08-05 DIAGNOSIS — M54.16 RADICULOPATHY, LUMBAR REGION: ICD-10-CM

## 2022-08-05 PROCEDURE — 99214 OFFICE O/P EST MOD 30 MIN: CPT

## 2022-08-19 ENCOUNTER — TRANSCRIPTION ENCOUNTER (OUTPATIENT)
Age: 62
End: 2022-08-19

## 2022-08-22 ENCOUNTER — TRANSCRIPTION ENCOUNTER (OUTPATIENT)
Age: 62
End: 2022-08-22

## 2022-08-23 ENCOUNTER — APPOINTMENT (OUTPATIENT)
Dept: MRI IMAGING | Facility: IMAGING CENTER | Age: 62
End: 2022-08-23

## 2022-08-23 ENCOUNTER — OUTPATIENT (OUTPATIENT)
Dept: OUTPATIENT SERVICES | Facility: HOSPITAL | Age: 62
LOS: 1 days | End: 2022-08-23
Payer: COMMERCIAL

## 2022-08-23 DIAGNOSIS — Z00.8 ENCOUNTER FOR OTHER GENERAL EXAMINATION: ICD-10-CM

## 2022-08-23 PROCEDURE — 77049 MRI BREAST C-+ W/CAD BI: CPT | Mod: 26

## 2022-08-23 PROCEDURE — C8937: CPT

## 2022-08-23 PROCEDURE — C8908: CPT

## 2022-08-23 PROCEDURE — A9585: CPT

## 2022-09-27 ENCOUNTER — OUTPATIENT (OUTPATIENT)
Dept: OUTPATIENT SERVICES | Facility: HOSPITAL | Age: 62
LOS: 1 days | Discharge: HOME | End: 2022-09-27

## 2022-09-27 DIAGNOSIS — C50.919 MALIGNANT NEOPLASM OF UNSPECIFIED SITE OF UNSPECIFIED FEMALE BREAST: ICD-10-CM

## 2022-10-02 NOTE — H&P PST ADULT - LAST ECHOCARDIOGRAM
SW/PATRICA Discharge Plan  Informed patient is ready for discharge.  Utilized video  [facility]ba ID [id number]  Chart reviewed and prior notes appreciated. Case discussed briefly with Primary RN, Charge RN, and the covering . Met with the patient and spouse at bedside where the RNCM role was introduced, the facesheet information was verified, and initiated safe dc planning conversations.   Patient is uninsured-message sent to [facility], provided Brian Washington with Financial services number to follow up on Monday  He does not have a PCP- Urgent TSP consult entered in Epic, called [phone] ([facility] line) and left  for follow up w Brian Washington ASAP, and sent an Email to [facility]. Patient needs follow up care including labs. Provided patient with [facility] phone number.  SW aware of possible need for han medications.    Patient’s discharge destination is home. Patient to be picked up by family.  Patient/interested person has been counseled for post hospitalization care. Initial implementation of the patient’s discharge plan has been arranged, including any devices/equipment needed for discharge. Discharge plan communicated to MD, RN, CM and SW.        9/21 9/21 EF 59 %

## 2022-10-04 ENCOUNTER — APPOINTMENT (OUTPATIENT)
Dept: HEMATOLOGY ONCOLOGY | Facility: CLINIC | Age: 62
End: 2022-10-04

## 2022-10-04 ENCOUNTER — RESULT REVIEW (OUTPATIENT)
Age: 62
End: 2022-10-04

## 2022-10-04 VITALS
DIASTOLIC BLOOD PRESSURE: 86 MMHG | BODY MASS INDEX: 19.59 KG/M2 | TEMPERATURE: 97.3 F | HEIGHT: 61.97 IN | WEIGHT: 106.46 LBS | RESPIRATION RATE: 16 BRPM | SYSTOLIC BLOOD PRESSURE: 144 MMHG | HEART RATE: 95 BPM | OXYGEN SATURATION: 97 %

## 2022-10-04 LAB
BASOPHILS # BLD AUTO: 0.05 K/UL — SIGNIFICANT CHANGE UP (ref 0–0.2)
BASOPHILS NFR BLD AUTO: 0.7 % — SIGNIFICANT CHANGE UP (ref 0–2)
CEA SERPL-MCNC: 6.4 NG/ML
EOSINOPHIL # BLD AUTO: 0.14 K/UL — SIGNIFICANT CHANGE UP (ref 0–0.5)
EOSINOPHIL NFR BLD AUTO: 2 % — SIGNIFICANT CHANGE UP (ref 0–6)
HCT VFR BLD CALC: 35.4 % — SIGNIFICANT CHANGE UP (ref 34.5–45)
HGB BLD-MCNC: 11.8 G/DL — SIGNIFICANT CHANGE UP (ref 11.5–15.5)
IMM GRANULOCYTES NFR BLD AUTO: 0.3 % — SIGNIFICANT CHANGE UP (ref 0–0.9)
LYMPHOCYTES # BLD AUTO: 2.47 K/UL — SIGNIFICANT CHANGE UP (ref 1–3.3)
LYMPHOCYTES # BLD AUTO: 34.8 % — SIGNIFICANT CHANGE UP (ref 13–44)
MCHC RBC-ENTMCNC: 30.6 PG — SIGNIFICANT CHANGE UP (ref 27–34)
MCHC RBC-ENTMCNC: 33.3 G/DL — SIGNIFICANT CHANGE UP (ref 32–36)
MCV RBC AUTO: 91.9 FL — SIGNIFICANT CHANGE UP (ref 80–100)
MONOCYTES # BLD AUTO: 0.62 K/UL — SIGNIFICANT CHANGE UP (ref 0–0.9)
MONOCYTES NFR BLD AUTO: 8.7 % — SIGNIFICANT CHANGE UP (ref 2–14)
NEUTROPHILS # BLD AUTO: 3.79 K/UL — SIGNIFICANT CHANGE UP (ref 1.8–7.4)
NEUTROPHILS NFR BLD AUTO: 53.5 % — SIGNIFICANT CHANGE UP (ref 43–77)
NRBC # BLD: 0 /100 WBCS — SIGNIFICANT CHANGE UP (ref 0–0)
PLATELET # BLD AUTO: 196 K/UL — SIGNIFICANT CHANGE UP (ref 150–400)
RBC # BLD: 3.85 M/UL — SIGNIFICANT CHANGE UP (ref 3.8–5.2)
RBC # FLD: 12.9 % — SIGNIFICANT CHANGE UP (ref 10.3–14.5)
WBC # BLD: 7.09 K/UL — SIGNIFICANT CHANGE UP (ref 3.8–10.5)
WBC # FLD AUTO: 7.09 K/UL — SIGNIFICANT CHANGE UP (ref 3.8–10.5)

## 2022-10-04 PROCEDURE — 99213 OFFICE O/P EST LOW 20 MIN: CPT

## 2022-10-04 RX ORDER — ANASTROZOLE 1 MG/1
1 TABLET ORAL DAILY
Qty: 90 | Refills: 1 | Status: DISCONTINUED | COMMUNITY
Start: 2021-09-13 | End: 2022-10-04

## 2022-10-05 LAB
ALBUMIN SERPL ELPH-MCNC: 4.7 G/DL
ALP BLD-CCNC: 73 U/L
ALT SERPL-CCNC: 13 U/L
ANION GAP SERPL CALC-SCNC: 14 MMOL/L
AST SERPL-CCNC: 25 U/L
BILIRUB SERPL-MCNC: 0.2 MG/DL
BUN SERPL-MCNC: 18 MG/DL
CALCIUM SERPL-MCNC: 9.4 MG/DL
CHLORIDE SERPL-SCNC: 99 MMOL/L
CO2 SERPL-SCNC: 24 MMOL/L
CREAT SERPL-MCNC: 0.97 MG/DL
EGFR: 66 ML/MIN/1.73M2
GLUCOSE SERPL-MCNC: 140 MG/DL
POTASSIUM SERPL-SCNC: 5 MMOL/L
PROT SERPL-MCNC: 6.8 G/DL
SODIUM SERPL-SCNC: 137 MMOL/L

## 2022-10-05 NOTE — ASSESSMENT
[Curative] : Goals of care discussed with patient: Curative [Palliative Care Plan] : not applicable at this time [FreeTextEntry1] : Patient seen with and plan discussed with Dr. Gray.\par \par Aryles Hedjar, MD, PGY-5\par Hematology/Oncology Fellow\par [facility]

## 2022-10-05 NOTE — HISTORY OF PRESENT ILLNESS
[Cardiovascular] : Cardiovascular [Constitutional] : Constitutional [ENT] : ENT [Dermatologic] : Dermatologic [Endocrine] : Endocrine [Gastrointestinal] : Gastrointestinal [Genitourinary] : Genitourinary [Gynecologic] : Gynecologic [Infectious] : Infectious [Musculoskeletal] : Musculoskeletal [Neurologic] : Neurologic [Pain] : Pain [Pulmonary] : Pulmonary [Hematologic] : Hematologic [Disease: _____________________] : Disease: [unfilled] [de-identified] :  50-year-old female coming in for recommendations and follow-up for her newly diagnosed tubular carcinoma of the right breast.  According to the patient she was diagnosed with this in April 2010.  Prior, patient had gone for a bilateral  mammogram with breast sonogram on March 1, 2010 which showed on the right breast at 7 to 8 o’clock position 6 mm from the nipple there was an irregularly bordered hypoechoic solid appearing  nodule measuring 7 x 4 x 5 mm.  There was also 6 x 4 x 5 mm hypoechoic nodule at 1 cm medially with smooth margins. At the 11 to 12 o’clock positions 5 cm from the nipple, there was a hypoechoic nodule questionably associated with calcification and at 11 o’clock position and 4 cm from the nipple there was a hypoechoic nodule possibly cyst with debris measuring 3 x 2 mm.  Patient has history of bilateral breast implants.  On March 5, 2010 patient had fine needle aspiration which came back as on the right breast at 7 to 8 o’clock position a cellular breast lesion with atypia suspicious for carcinoma, would recommend excision for complete evaluation.  The right breast 11 to 12 o’clock position biopsy was acellular specimen.  Patient on March 23, 2010 underwent bilateral breast MRI’s. At that time the MRI was very difficult because of patient motion. There was a suspicious enhancement of the right lower outer quadrant and right upper inner quadrant, in the left breast there was an enhancing mass in the lower outer quadrant.    Patient underwent in April 2010 partial right mastectomy. The final pathology showed well differentiated tubular carcinoma, florid proliferative fibrocystic change with nodular adenosis. The invasive tumor had and SBR score of 3/9. The tumor had a maximum dimension of 0.8 cm.  The excision biopsy of the right breast at the 11 o’clock position showed mild proliferative fibrocystic changes. Estrogen receptor was positive, progesterone receptor was positive, Ki-67 was positive 10%  and (*) was 1+. Her right sentinel lymph node excision and right breast excision were negative for any kind of carcinoma. Since then patient has been on Arimidex and has been tolerating it well. [de-identified] : Patient is here for follow up, feeling well. She has not been seen since 2019.\par She recently had a L3-5 laminectomy by Dr. Morgan.\par She does have significant neck pain not improved from pain meds (worsened after recent golf trip) but has not been evaluated for it yet.

## 2022-10-06 ENCOUNTER — APPOINTMENT (OUTPATIENT)
Dept: ORTHOPEDIC SURGERY | Facility: CLINIC | Age: 62
End: 2022-10-06

## 2022-10-06 VITALS
HEIGHT: 62 IN | BODY MASS INDEX: 19.51 KG/M2 | HEART RATE: 90 BPM | SYSTOLIC BLOOD PRESSURE: 144 MMHG | WEIGHT: 106 LBS | DIASTOLIC BLOOD PRESSURE: 85 MMHG

## 2022-10-06 LAB — CANCER AG27-29 SERPL-ACNC: 26.5 U/ML

## 2022-10-06 PROCEDURE — 99215 OFFICE O/P EST HI 40 MIN: CPT

## 2022-10-06 PROCEDURE — 72040 X-RAY EXAM NECK SPINE 2-3 VW: CPT

## 2022-10-11 ENCOUNTER — APPOINTMENT (OUTPATIENT)
Dept: ORTHOPEDIC SURGERY | Facility: CLINIC | Age: 62
End: 2022-10-11

## 2022-10-19 ENCOUNTER — APPOINTMENT (OUTPATIENT)
Dept: MRI IMAGING | Facility: IMAGING CENTER | Age: 62
End: 2022-10-19

## 2022-10-19 ENCOUNTER — OUTPATIENT (OUTPATIENT)
Dept: OUTPATIENT SERVICES | Facility: HOSPITAL | Age: 62
LOS: 1 days | End: 2022-10-19
Payer: COMMERCIAL

## 2022-10-19 DIAGNOSIS — M50.30 OTHER CERVICAL DISC DEGENERATION, UNSPECIFIED CERVICAL REGION: ICD-10-CM

## 2022-10-19 DIAGNOSIS — Z00.8 ENCOUNTER FOR OTHER GENERAL EXAMINATION: ICD-10-CM

## 2022-10-19 PROCEDURE — 72141 MRI NECK SPINE W/O DYE: CPT

## 2022-10-19 PROCEDURE — 72141 MRI NECK SPINE W/O DYE: CPT | Mod: 26

## 2022-11-01 ENCOUNTER — RX RENEWAL (OUTPATIENT)
Age: 62
End: 2022-11-01

## 2022-11-11 ENCOUNTER — TRANSCRIPTION ENCOUNTER (OUTPATIENT)
Age: 62
End: 2022-11-11

## 2022-11-11 RX ORDER — NAPROXEN 500 MG/1
500 TABLET ORAL
Qty: 60 | Refills: 1 | Status: DISCONTINUED | COMMUNITY
Start: 2022-08-05 | End: 2022-11-11

## 2022-11-11 RX ORDER — MELOXICAM 15 MG/1
15 TABLET ORAL
Qty: 30 | Refills: 1 | Status: DISCONTINUED | COMMUNITY
Start: 2022-03-31 | End: 2022-11-11

## 2022-12-13 ENCOUNTER — RX RENEWAL (OUTPATIENT)
Age: 62
End: 2022-12-13

## 2022-12-14 NOTE — PROGRESS NOTE ADULT - PROBLEM SELECTOR PROBLEM 4
History of sinusitis Electrodesiccation Text: The wound bed was treated with electrodesiccation after the biopsy was performed.

## 2023-01-03 ENCOUNTER — NON-APPOINTMENT (OUTPATIENT)
Age: 63
End: 2023-01-03

## 2023-01-13 ENCOUNTER — APPOINTMENT (OUTPATIENT)
Dept: ORTHOPEDIC SURGERY | Facility: CLINIC | Age: 63
End: 2023-01-13
Payer: COMMERCIAL

## 2023-01-13 DIAGNOSIS — M47.812 SPONDYLOSIS W/OUT MYELOPATHY OR RADICULOPATHY, CERVICAL REGION: ICD-10-CM

## 2023-01-13 DIAGNOSIS — M50.30 OTHER CERVICAL DISC DEGENERATION, UNSPECIFIED CERVICAL REGION: ICD-10-CM

## 2023-01-13 PROCEDURE — 99214 OFFICE O/P EST MOD 30 MIN: CPT

## 2023-01-13 RX ORDER — DICLOFENAC EPOLAMINE 0.01 G/1
1.3 SYSTEM TOPICAL TWICE DAILY
Qty: 60 | Refills: 1 | Status: ACTIVE | COMMUNITY
Start: 2023-01-13 | End: 1900-01-01

## 2023-01-26 NOTE — H&P PST ADULT - PRO TOBACCO TYPE
Skyrizi Counseling: I discussed with the patient the risks of risankizumab-rzaa including but not limited to immunosuppression, and serious infections.  The patient understands that monitoring is required including a PPD at baseline and must alert us or the primary physician if symptoms of infection or other concerning signs are noted. cigarettes

## 2023-02-01 ENCOUNTER — APPOINTMENT (OUTPATIENT)
Dept: PHYSICAL MEDICINE AND REHAB | Facility: CLINIC | Age: 63
End: 2023-02-01

## 2023-02-13 NOTE — ED ADULT TRIAGE NOTE - PAIN RATING/NUMBER SCALE (0-10): ACTIVITY
[facility]: Palliative Care                                                                                        Incoming call from home health nurse requesting refill of oxycodone    Last date written and prescribing provider: 12/22/2022 [place]    Last office visit: 1/27/2023 Dr. Delon Giraldo  Next office visit: 5/17/2023     reviewed with no concerns  Last sold per :  12/22/2022      Kim Yancey LPN  Palliative  Care Coordination  [phone]   0

## 2023-02-21 ENCOUNTER — TRANSCRIPTION ENCOUNTER (OUTPATIENT)
Age: 63
End: 2023-02-21

## 2023-03-05 ENCOUNTER — RX RENEWAL (OUTPATIENT)
Age: 63
End: 2023-03-05

## 2023-03-06 ENCOUNTER — TRANSCRIPTION ENCOUNTER (OUTPATIENT)
Age: 63
End: 2023-03-06

## 2023-03-16 NOTE — OCCUPATIONAL THERAPY INITIAL EVALUATION ADULT - LEVEL OF CONSCIOUSNESS, OT EVAL
3/16/2023    No medication taken. VSS.  Heart rate at goal. Patient resting in Echo waiting room.  Patient has a Power injectable mediport.     VS and medication:    0914:  /59  HR 47    0929:  Mediport accessed with a 20 g medina needle (Power inject) and flushed with 10 cc 0.9% NS.    0931:  /52  HR 49    0934:  Took patient to be scanned.    0950:  CTC and successful.    0951:  /56  HR 62    0953:  Deaccessed port after being flushed with 10 cc  0.9% NS and heparin 500 units.  2x2 sterile gauze applied with tegaderm over that.      0956:  Water bottle given and patient left CT area.    CT imaging completed, VSS stable, patient asymptomatic. Patient encourage to increase oral hydration (64 ounces today and 64 ounces tomorrow) for removal of contrast. Patient verbalized understanding and has no further questions at this time.                alert

## 2023-03-23 ENCOUNTER — NON-APPOINTMENT (OUTPATIENT)
Age: 63
End: 2023-03-23

## 2023-03-23 ENCOUNTER — APPOINTMENT (OUTPATIENT)
Dept: CARDIOLOGY | Facility: CLINIC | Age: 63
End: 2023-03-23
Payer: COMMERCIAL

## 2023-03-23 VITALS
TEMPERATURE: 97.6 F | DIASTOLIC BLOOD PRESSURE: 70 MMHG | WEIGHT: 102 LBS | SYSTOLIC BLOOD PRESSURE: 110 MMHG | HEART RATE: 97 BPM | BODY MASS INDEX: 18.77 KG/M2 | OXYGEN SATURATION: 97 % | HEIGHT: 62 IN

## 2023-03-23 DIAGNOSIS — E53.8 DEFICIENCY OF OTHER SPECIFIED B GROUP VITAMINS: ICD-10-CM

## 2023-03-23 DIAGNOSIS — Z71.85 ENCOUNTER FOR IMMUNIZATION SAFETY COUNSELING: ICD-10-CM

## 2023-03-23 PROCEDURE — 93000 ELECTROCARDIOGRAM COMPLETE: CPT

## 2023-03-23 PROCEDURE — 99396 PREV VISIT EST AGE 40-64: CPT

## 2023-03-23 RX ORDER — DICLOFENAC SODIUM 16.05 MG/ML
1.5 SOLUTION TOPICAL
Qty: 1 | Refills: 1 | Status: DISCONTINUED | COMMUNITY
Start: 2022-10-06 | End: 2023-03-23

## 2023-03-23 RX ORDER — TIZANIDINE 2 MG/1
2 TABLET ORAL EVERY 8 HOURS
Qty: 90 | Refills: 1 | Status: DISCONTINUED | COMMUNITY
Start: 2023-01-03 | End: 2023-03-23

## 2023-03-23 RX ORDER — CYCLOBENZAPRINE HYDROCHLORIDE 5 MG/1
5 TABLET, FILM COATED ORAL
Qty: 90 | Refills: 1 | Status: DISCONTINUED | COMMUNITY
Start: 2022-03-31 | End: 2023-03-23

## 2023-03-23 RX ORDER — ONDANSETRON 8 MG/1
8 TABLET ORAL
Qty: 30 | Refills: 0 | Status: DISCONTINUED | COMMUNITY
Start: 2022-03-09 | End: 2023-03-23

## 2023-03-23 RX ORDER — GABAPENTIN 100 MG/1
100 CAPSULE ORAL
Qty: 90 | Refills: 1 | Status: DISCONTINUED | COMMUNITY
Start: 2022-10-06 | End: 2023-03-23

## 2023-03-23 RX ORDER — METHYLPREDNISOLONE 4 MG/1
4 TABLET ORAL
Qty: 1 | Refills: 0 | Status: DISCONTINUED | COMMUNITY
Start: 2021-09-13 | End: 2023-03-23

## 2023-03-23 RX ORDER — MELOXICAM 15 MG/1
15 TABLET ORAL
Qty: 30 | Refills: 0 | Status: DISCONTINUED | COMMUNITY
Start: 2023-01-03 | End: 2023-03-23

## 2023-03-23 RX ORDER — ASPIRIN 81 MG/1
81 TABLET, CHEWABLE ORAL
Qty: 100 | Refills: 0 | Status: DISCONTINUED | COMMUNITY
Start: 2019-09-03 | End: 2023-03-23

## 2023-03-23 RX ORDER — DICLOFENAC SODIUM 16.05 MG/ML
1.5 SOLUTION TOPICAL
Qty: 1 | Refills: 1 | Status: DISCONTINUED | COMMUNITY
Start: 2023-01-13 | End: 2023-03-23

## 2023-03-23 RX ORDER — ASPIRIN 81 MG/1
81 TABLET, FILM COATED ORAL DAILY
Refills: 0 | Status: ACTIVE | COMMUNITY
Start: 2023-03-23

## 2023-03-23 RX ORDER — HYDROCORTISONE 25 MG/G
2.5 CREAM TOPICAL
Qty: 1 | Refills: 6 | Status: DISCONTINUED | COMMUNITY
Start: 2019-08-14 | End: 2023-03-23

## 2023-03-23 RX ORDER — GABAPENTIN 100 MG/1
100 CAPSULE ORAL
Qty: 90 | Refills: 1 | Status: DISCONTINUED | COMMUNITY
Start: 2022-03-31 | End: 2023-03-23

## 2023-03-23 RX ORDER — ALBUTEROL SULFATE 90 UG/1
108 (90 BASE) INHALANT RESPIRATORY (INHALATION)
Qty: 1 | Refills: 1 | Status: DISCONTINUED | COMMUNITY
Start: 2020-11-17 | End: 2023-03-23

## 2023-03-23 RX ORDER — CYCLOBENZAPRINE HYDROCHLORIDE 5 MG/1
5 TABLET, FILM COATED ORAL
Qty: 30 | Refills: 0 | Status: DISCONTINUED | COMMUNITY
Start: 2022-10-06 | End: 2023-03-23

## 2023-03-23 RX ORDER — FOLIC ACID 1 MG/1
1 TABLET ORAL DAILY
Qty: 180 | Refills: 1 | Status: DISCONTINUED | COMMUNITY
Start: 2019-03-25 | End: 2023-03-23

## 2023-03-23 RX ORDER — OXYCODONE AND ACETAMINOPHEN 10; 325 MG/1; MG/1
10-325 TABLET ORAL 4 TIMES DAILY
Qty: 120 | Refills: 0 | Status: DISCONTINUED | COMMUNITY
Start: 2022-03-31 | End: 2023-03-23

## 2023-03-23 NOTE — HISTORY OF PRESENT ILLNESS
[FreeTextEntry1] : This is a 62 y/o female with a pmhx of CAD here today for an annual wellness exam.  Patient is s/p cervical spine fusion 3 weeks ago with Dr. Ramos at [facility]. Patient feels well and has no acute concerns or complaints. Patient denies chest pain, dyspnea, palpitations, dizziness, syncope, changes in bowel/bladder habits or appetite. \par

## 2023-03-23 NOTE — PHYSICAL EXAM
[Well Developed] : well developed [Well Nourished] : well nourished [No Acute Distress] : no acute distress [Normal Conjunctiva] : normal conjunctiva [Normal Venous Pressure] : normal venous pressure [No Carotid Bruit] : no carotid bruit [Normal S1, S2] : normal S1, S2 [No Rub] : no rub [No Gallop] : no gallop [Clear Lung Fields] : clear lung fields [Good Air Entry] : good air entry [No Respiratory Distress] : no respiratory distress  [Soft] : abdomen soft [Non Tender] : non-tender [No Masses/organomegaly] : no masses/organomegaly [Normal Bowel Sounds] : normal bowel sounds [Normal Gait] : normal gait [No Edema] : no edema [No Cyanosis] : no cyanosis [No Clubbing] : no clubbing [No Varicosities] : no varicosities [No Rash] : no rash [No Skin Lesions] : no skin lesions [Moves all extremities] : moves all extremities [No Focal Deficits] : no focal deficits [Normal Speech] : normal speech [Alert and Oriented] : alert and oriented [Normal memory] : normal memory [Murmur] : murmur [de-identified] : 1/6 systolic murmru sapex  [de-identified] : nevi

## 2023-03-23 NOTE — COUNSELING
[Cessation strategies including cessation program discussed] : Cessation strategies including cessation program discussed [Use of nicotine replacement therapies and other medications discussed] : Use of nicotine replacement therapies and other medications discussed

## 2023-03-28 ENCOUNTER — NON-APPOINTMENT (OUTPATIENT)
Age: 63
End: 2023-03-28

## 2023-04-11 ENCOUNTER — APPOINTMENT (OUTPATIENT)
Dept: OBGYN | Facility: CLINIC | Age: 63
End: 2023-04-11
Payer: COMMERCIAL

## 2023-04-11 VITALS
DIASTOLIC BLOOD PRESSURE: 76 MMHG | HEART RATE: 106 BPM | BODY MASS INDEX: 18.4 KG/M2 | SYSTOLIC BLOOD PRESSURE: 131 MMHG | WEIGHT: 100 LBS | HEIGHT: 62 IN

## 2023-04-11 DIAGNOSIS — Z01.419 ENCOUNTER FOR GYNECOLOGICAL EXAMINATION (GENERAL) (ROUTINE) W/OUT ABNORMAL FINDINGS: ICD-10-CM

## 2023-04-11 PROCEDURE — 99386 PREV VISIT NEW AGE 40-64: CPT

## 2023-04-11 NOTE — PHYSICAL EXAM
[FreeTextEntry1] : SANFORD Escobar [Appropriately responsive] : appropriately responsive [Alert] : alert [No Acute Distress] : no acute distress [No Lymphadenopathy] : no lymphadenopathy [Regular Rate Rhythm] : regular rate rhythm [No Murmurs] : no murmurs [Clear to Auscultation B/L] : clear to auscultation bilaterally [Soft] : soft [Non-tender] : non-tender [Non-distended] : non-distended [No HSM] : No HSM [No Lesions] : no lesions [No Mass] : no mass [Oriented x3] : oriented x3 [Examination Of The Breasts] : a normal appearance [] : implants [No Masses] : no breast masses were palpable [Labia Majora] : normal [Labia Minora] : normal [Atrophy] : atrophy [Normal] : normal [Tenderness] : nontender [Enlarged ___ wks] : not enlarged [Uterine Adnexae] : normal

## 2023-04-11 NOTE — PLAN
[FreeTextEntry1] : 62 y/o presents for annual visit. \par \par #HM\par -Pap with HPV today\par -Mammo/MRI up to date\par -DEXA requisition provided \par -Colonoscopy up to date \par \par #Vaginal atrophy\par -Recommended coconut oil or Replense, use nightly for 2 weeks followed by twice weekly \par \par samantha HARDIN

## 2023-04-12 ENCOUNTER — APPOINTMENT (OUTPATIENT)
Dept: CARDIOLOGY | Facility: CLINIC | Age: 63
End: 2023-04-12
Payer: COMMERCIAL

## 2023-04-12 PROCEDURE — 93306 TTE W/DOPPLER COMPLETE: CPT

## 2023-04-14 LAB — HPV HIGH+LOW RISK DNA PNL CVX: NOT DETECTED

## 2023-04-16 LAB — CYTOLOGY CVX/VAG DOC THIN PREP: ABNORMAL

## 2023-04-24 ENCOUNTER — TRANSCRIPTION ENCOUNTER (OUTPATIENT)
Age: 63
End: 2023-04-24

## 2023-05-14 ENCOUNTER — RX RENEWAL (OUTPATIENT)
Age: 63
End: 2023-05-14

## 2023-06-22 ENCOUNTER — RX RENEWAL (OUTPATIENT)
Age: 63
End: 2023-06-22

## 2023-07-24 ENCOUNTER — TRANSCRIPTION ENCOUNTER (OUTPATIENT)
Age: 63
End: 2023-07-24

## 2023-08-31 ENCOUNTER — TRANSCRIPTION ENCOUNTER (OUTPATIENT)
Age: 63
End: 2023-08-31

## 2023-10-16 ENCOUNTER — NON-APPOINTMENT (OUTPATIENT)
Age: 63
End: 2023-10-16

## 2023-11-07 ENCOUNTER — TRANSCRIPTION ENCOUNTER (OUTPATIENT)
Age: 63
End: 2023-11-07

## 2023-11-09 ENCOUNTER — RESULT REVIEW (OUTPATIENT)
Age: 63
End: 2023-11-09

## 2023-11-10 ENCOUNTER — RESULT REVIEW (OUTPATIENT)
Age: 63
End: 2023-11-10

## 2023-11-10 ENCOUNTER — APPOINTMENT (OUTPATIENT)
Dept: ULTRASOUND IMAGING | Facility: IMAGING CENTER | Age: 63
End: 2023-11-10
Payer: COMMERCIAL

## 2023-11-10 ENCOUNTER — APPOINTMENT (OUTPATIENT)
Dept: MAMMOGRAPHY | Facility: IMAGING CENTER | Age: 63
End: 2023-11-10
Payer: COMMERCIAL

## 2023-11-10 ENCOUNTER — OUTPATIENT (OUTPATIENT)
Dept: OUTPATIENT SERVICES | Facility: HOSPITAL | Age: 63
LOS: 1 days | Discharge: HOME | End: 2023-11-10

## 2023-11-10 ENCOUNTER — OUTPATIENT (OUTPATIENT)
Dept: OUTPATIENT SERVICES | Facility: HOSPITAL | Age: 63
LOS: 1 days | End: 2023-11-10
Payer: COMMERCIAL

## 2023-11-10 ENCOUNTER — APPOINTMENT (OUTPATIENT)
Dept: HEMATOLOGY ONCOLOGY | Facility: CLINIC | Age: 63
End: 2023-11-10

## 2023-11-10 DIAGNOSIS — C50.919 MALIGNANT NEOPLASM OF UNSPECIFIED SITE OF UNSPECIFIED FEMALE BREAST: ICD-10-CM

## 2023-11-10 DIAGNOSIS — Z00.00 ENCOUNTER FOR GENERAL ADULT MEDICAL EXAMINATION WITHOUT ABNORMAL FINDINGS: ICD-10-CM

## 2023-11-10 LAB
BASOPHILS # BLD AUTO: 0.08 K/UL — SIGNIFICANT CHANGE UP (ref 0–0.2)
BASOPHILS # BLD AUTO: 0.08 K/UL — SIGNIFICANT CHANGE UP (ref 0–0.2)
BASOPHILS NFR BLD AUTO: 0.9 % — SIGNIFICANT CHANGE UP (ref 0–2)
BASOPHILS NFR BLD AUTO: 0.9 % — SIGNIFICANT CHANGE UP (ref 0–2)
EOSINOPHIL # BLD AUTO: 0.55 K/UL — HIGH (ref 0–0.5)
EOSINOPHIL # BLD AUTO: 0.55 K/UL — HIGH (ref 0–0.5)
EOSINOPHIL NFR BLD AUTO: 6.2 % — HIGH (ref 0–6)
EOSINOPHIL NFR BLD AUTO: 6.2 % — HIGH (ref 0–6)
HCT VFR BLD CALC: 34.1 % — LOW (ref 34.5–45)
HCT VFR BLD CALC: 34.1 % — LOW (ref 34.5–45)
HGB BLD-MCNC: 11.8 G/DL — SIGNIFICANT CHANGE UP (ref 11.5–15.5)
HGB BLD-MCNC: 11.8 G/DL — SIGNIFICANT CHANGE UP (ref 11.5–15.5)
IMM GRANULOCYTES NFR BLD AUTO: 0.2 % — SIGNIFICANT CHANGE UP (ref 0–0.9)
IMM GRANULOCYTES NFR BLD AUTO: 0.2 % — SIGNIFICANT CHANGE UP (ref 0–0.9)
LYMPHOCYTES # BLD AUTO: 3.48 K/UL — HIGH (ref 1–3.3)
LYMPHOCYTES # BLD AUTO: 3.48 K/UL — HIGH (ref 1–3.3)
LYMPHOCYTES # BLD AUTO: 39 % — SIGNIFICANT CHANGE UP (ref 13–44)
LYMPHOCYTES # BLD AUTO: 39 % — SIGNIFICANT CHANGE UP (ref 13–44)
MCHC RBC-ENTMCNC: 32 PG — SIGNIFICANT CHANGE UP (ref 27–34)
MCHC RBC-ENTMCNC: 32 PG — SIGNIFICANT CHANGE UP (ref 27–34)
MCHC RBC-ENTMCNC: 34.6 G/DL — SIGNIFICANT CHANGE UP (ref 32–36)
MCHC RBC-ENTMCNC: 34.6 G/DL — SIGNIFICANT CHANGE UP (ref 32–36)
MCV RBC AUTO: 92.4 FL — SIGNIFICANT CHANGE UP (ref 80–100)
MCV RBC AUTO: 92.4 FL — SIGNIFICANT CHANGE UP (ref 80–100)
MONOCYTES # BLD AUTO: 0.83 K/UL — SIGNIFICANT CHANGE UP (ref 0–0.9)
MONOCYTES # BLD AUTO: 0.83 K/UL — SIGNIFICANT CHANGE UP (ref 0–0.9)
MONOCYTES NFR BLD AUTO: 9.3 % — SIGNIFICANT CHANGE UP (ref 2–14)
MONOCYTES NFR BLD AUTO: 9.3 % — SIGNIFICANT CHANGE UP (ref 2–14)
NEUTROPHILS # BLD AUTO: 3.97 K/UL — SIGNIFICANT CHANGE UP (ref 1.8–7.4)
NEUTROPHILS # BLD AUTO: 3.97 K/UL — SIGNIFICANT CHANGE UP (ref 1.8–7.4)
NEUTROPHILS NFR BLD AUTO: 44.4 % — SIGNIFICANT CHANGE UP (ref 43–77)
NEUTROPHILS NFR BLD AUTO: 44.4 % — SIGNIFICANT CHANGE UP (ref 43–77)
NRBC # BLD: 0 /100 WBCS — SIGNIFICANT CHANGE UP (ref 0–0)
NRBC # BLD: 0 /100 WBCS — SIGNIFICANT CHANGE UP (ref 0–0)
PLATELET # BLD AUTO: 225 K/UL — SIGNIFICANT CHANGE UP (ref 150–400)
PLATELET # BLD AUTO: 225 K/UL — SIGNIFICANT CHANGE UP (ref 150–400)
RBC # BLD: 3.69 M/UL — LOW (ref 3.8–5.2)
RBC # BLD: 3.69 M/UL — LOW (ref 3.8–5.2)
RBC # FLD: 12.6 % — SIGNIFICANT CHANGE UP (ref 10.3–14.5)
RBC # FLD: 12.6 % — SIGNIFICANT CHANGE UP (ref 10.3–14.5)
WBC # BLD: 8.93 K/UL — SIGNIFICANT CHANGE UP (ref 3.8–10.5)
WBC # BLD: 8.93 K/UL — SIGNIFICANT CHANGE UP (ref 3.8–10.5)
WBC # FLD AUTO: 8.93 K/UL — SIGNIFICANT CHANGE UP (ref 3.8–10.5)
WBC # FLD AUTO: 8.93 K/UL — SIGNIFICANT CHANGE UP (ref 3.8–10.5)

## 2023-11-10 PROCEDURE — 76641 ULTRASOUND BREAST COMPLETE: CPT | Mod: 26,50

## 2023-11-10 PROCEDURE — 76641 ULTRASOUND BREAST COMPLETE: CPT

## 2023-11-10 PROCEDURE — 77067 SCR MAMMO BI INCL CAD: CPT

## 2023-11-10 PROCEDURE — 77063 BREAST TOMOSYNTHESIS BI: CPT | Mod: 26

## 2023-11-10 PROCEDURE — 77063 BREAST TOMOSYNTHESIS BI: CPT

## 2023-11-10 PROCEDURE — 77067 SCR MAMMO BI INCL CAD: CPT | Mod: 26

## 2023-11-12 LAB
ALBUMIN SERPL ELPH-MCNC: 4.7 G/DL
ALP BLD-CCNC: 95 U/L
ALT SERPL-CCNC: 16 U/L
ANION GAP SERPL CALC-SCNC: 11 MMOL/L
AST SERPL-CCNC: 25 U/L
BILIRUB SERPL-MCNC: <0.2 MG/DL
BUN SERPL-MCNC: 21 MG/DL
CALCIUM SERPL-MCNC: 9.4 MG/DL
CANCER AG27-29 SERPL-ACNC: 36.8 U/ML
CEA SERPL-MCNC: 7.3 NG/ML
CHLORIDE SERPL-SCNC: 99 MMOL/L
CO2 SERPL-SCNC: 27 MMOL/L
CREAT SERPL-MCNC: 0.92 MG/DL
EGFR: 70 ML/MIN/1.73M2
GLUCOSE SERPL-MCNC: 118 MG/DL
POTASSIUM SERPL-SCNC: 5 MMOL/L
PROT SERPL-MCNC: 7.1 G/DL
SODIUM SERPL-SCNC: 137 MMOL/L

## 2023-12-07 ENCOUNTER — APPOINTMENT (OUTPATIENT)
Dept: HEMATOLOGY ONCOLOGY | Facility: CLINIC | Age: 63
End: 2023-12-07
Payer: COMMERCIAL

## 2023-12-07 VITALS
HEART RATE: 94 BPM | BODY MASS INDEX: 19.23 KG/M2 | WEIGHT: 105.16 LBS | SYSTOLIC BLOOD PRESSURE: 152 MMHG | DIASTOLIC BLOOD PRESSURE: 76 MMHG | RESPIRATION RATE: 16 BRPM | OXYGEN SATURATION: 99 % | TEMPERATURE: 97.7 F

## 2023-12-07 PROCEDURE — 99213 OFFICE O/P EST LOW 20 MIN: CPT

## 2024-02-13 ENCOUNTER — RX RENEWAL (OUTPATIENT)
Age: 64
End: 2024-02-13

## 2024-03-09 ENCOUNTER — RX RENEWAL (OUTPATIENT)
Age: 64
End: 2024-03-09

## 2024-03-09 RX ORDER — EZETIMIBE 10 MG/1
10 TABLET ORAL DAILY
Qty: 90 | Refills: 1 | Status: ACTIVE | COMMUNITY
Start: 2021-09-23 | End: 1900-01-01

## 2024-04-15 NOTE — PATIENT PROFILE ADULT - FUNCTIONAL ASSESSMENT - DAILY ACTIVITY 6.
Session ID: [id number]  Language: Tanzanian Crewendy   ID: #[id number]   Name: Kaycee 4 = No assist / stand by assistance

## 2024-04-16 RX ORDER — ALPRAZOLAM 0.5 MG/1
0.5 TABLET ORAL TWICE DAILY
Qty: 60 | Refills: 0 | Status: ACTIVE | COMMUNITY
Start: 2019-07-15 | End: 1900-01-01

## 2024-04-18 ENCOUNTER — RX RENEWAL (OUTPATIENT)
Age: 64
End: 2024-04-18

## 2024-04-21 ENCOUNTER — RX RENEWAL (OUTPATIENT)
Age: 64
End: 2024-04-21

## 2024-05-02 LAB
25(OH)D3 SERPL-MCNC: 50.4 NG/ML
ALBUMIN SERPL ELPH-MCNC: 4.5 G/DL
ALBUMIN SERPL ELPH-MCNC: 4.7 G/DL
ALP BLD-CCNC: 56 U/L
ALP BLD-CCNC: 85 U/L
ALT SERPL-CCNC: 12 U/L
ALT SERPL-CCNC: 20 U/L
ANION GAP SERPL CALC-SCNC: 14 MMOL/L
ANION GAP SERPL CALC-SCNC: 16 MMOL/L
APPEARANCE: ABNORMAL
AST SERPL-CCNC: 26 U/L
AST SERPL-CCNC: 28 U/L
BACTERIA: NEGATIVE
BASOPHILS # BLD AUTO: 0.05 K/UL
BASOPHILS # BLD AUTO: 0.06 K/UL
BASOPHILS NFR BLD AUTO: 0.5 %
BASOPHILS NFR BLD AUTO: 0.8 %
BILIRUB DIRECT SERPL-MCNC: 0.1 MG/DL
BILIRUB INDIRECT SERPL-MCNC: 0.1 MG/DL
BILIRUB SERPL-MCNC: 0.2 MG/DL
BILIRUB SERPL-MCNC: 0.3 MG/DL
BILIRUBIN URINE: NEGATIVE
BLOOD URINE: NEGATIVE
BUN SERPL-MCNC: 19 MG/DL
BUN SERPL-MCNC: 4 MG/DL
CALCIUM SERPL-MCNC: 10.2 MG/DL
CALCIUM SERPL-MCNC: 9.5 MG/DL
CHLORIDE SERPL-SCNC: 96 MMOL/L
CHLORIDE SERPL-SCNC: 98 MMOL/L
CHOLEST SERPL-MCNC: 233 MG/DL
CHOLEST SERPL-MCNC: 265 MG/DL
CK SERPL-CCNC: 62 U/L
CK SERPL-CCNC: 91 U/L
CO2 SERPL-SCNC: 24 MMOL/L
CO2 SERPL-SCNC: 25 MMOL/L
COLOR: YELLOW
CREAT SERPL-MCNC: 0.77 MG/DL
CREAT SERPL-MCNC: 0.9 MG/DL
EGFR: 72 ML/MIN/1.73M2
EGFR: 87 ML/MIN/1.73M2
EOSINOPHIL # BLD AUTO: 0.08 K/UL
EOSINOPHIL # BLD AUTO: 0.14 K/UL
EOSINOPHIL NFR BLD AUTO: 0.7 %
EOSINOPHIL NFR BLD AUTO: 1.8 %
ESTIMATED AVERAGE GLUCOSE: 123 MG/DL
ESTIMATED AVERAGE GLUCOSE: 126 MG/DL
FERRITIN SERPL-MCNC: 102 NG/ML
FOLATE SERPL-MCNC: 11.3 NG/ML
GLUCOSE QUALITATIVE U: NEGATIVE
GLUCOSE SERPL-MCNC: 116 MG/DL
GLUCOSE SERPL-MCNC: 98 MG/DL
HBA1C MFR BLD HPLC: 5.9 %
HBA1C MFR BLD HPLC: 6 %
HCT VFR BLD CALC: 34.4 %
HCT VFR BLD CALC: 38 %
HDLC SERPL-MCNC: 57 MG/DL
HDLC SERPL-MCNC: 60 MG/DL
HGB BLD-MCNC: 11.1 G/DL
HGB BLD-MCNC: 12.7 G/DL
HYALINE CASTS: 2 /LPF
IMM GRANULOCYTES NFR BLD AUTO: 0.1 %
IMM GRANULOCYTES NFR BLD AUTO: 0.3 %
IRON SATN MFR SERPL: 24 %
IRON SERPL-MCNC: 73 UG/DL
KETONES URINE: NEGATIVE
LDLC SERPL CALC-MCNC: 160 MG/DL
LDLC SERPL CALC-MCNC: 190 MG/DL
LEUKOCYTE ESTERASE URINE: ABNORMAL
LYMPHOCYTES # BLD AUTO: 2.77 K/UL
LYMPHOCYTES # BLD AUTO: 3.72 K/UL
LYMPHOCYTES NFR BLD AUTO: 34.1 %
LYMPHOCYTES NFR BLD AUTO: 35.6 %
MAGNESIUM SERPL-MCNC: 1.8 MG/DL
MAGNESIUM SERPL-MCNC: 2 MG/DL
MAN DIFF?: NORMAL
MAN DIFF?: NORMAL
MCHC RBC-ENTMCNC: 30.7 PG
MCHC RBC-ENTMCNC: 31.7 PG
MCHC RBC-ENTMCNC: 32.3 GM/DL
MCHC RBC-ENTMCNC: 33.4 GM/DL
MCV RBC AUTO: 94.8 FL
MCV RBC AUTO: 95 FL
MICROSCOPIC-UA: NORMAL
MONOCYTES # BLD AUTO: 0.68 K/UL
MONOCYTES # BLD AUTO: 0.85 K/UL
MONOCYTES NFR BLD AUTO: 7.8 %
MONOCYTES NFR BLD AUTO: 8.7 %
NEUTROPHILS # BLD AUTO: 4.12 K/UL
NEUTROPHILS # BLD AUTO: 6.17 K/UL
NEUTROPHILS NFR BLD AUTO: 53 %
NEUTROPHILS NFR BLD AUTO: 56.6 %
NITRITE URINE: NEGATIVE
NONHDLC SERPL-MCNC: 176 MG/DL
NONHDLC SERPL-MCNC: 205 MG/DL
PH URINE: 6.5
PHOSPHATE SERPL-MCNC: 4.7 MG/DL
PLATELET # BLD AUTO: 236 K/UL
PLATELET # BLD AUTO: 301 K/UL
POTASSIUM SERPL-SCNC: 3.8 MMOL/L
POTASSIUM SERPL-SCNC: 4.9 MMOL/L
PROT SERPL-MCNC: 7.5 G/DL
PROT SERPL-MCNC: 7.5 G/DL
PROTEIN URINE: NORMAL
RBC # BLD: 3.62 M/UL
RBC # BLD: 4.01 M/UL
RBC # FLD: 12.6 %
RBC # FLD: 12.7 %
RED BLOOD CELLS URINE: 4 /HPF
SODIUM SERPL-SCNC: 134 MMOL/L
SODIUM SERPL-SCNC: 138 MMOL/L
SPECIFIC GRAVITY URINE: 1.02
SQUAMOUS EPITHELIAL CELLS: 1 /HPF
T4 FREE SERPL-MCNC: 1.1 NG/DL
T4 SERPL-MCNC: 8.2 UG/DL
TIBC SERPL-MCNC: 311 UG/DL
TRIGL SERPL-MCNC: 79 MG/DL
TRIGL SERPL-MCNC: 83 MG/DL
TSH SERPL-ACNC: 0.81 UIU/ML
TSH SERPL-ACNC: 0.99 UIU/ML
UIBC SERPL-MCNC: 237 UG/DL
UROBILINOGEN URINE: NORMAL
VIT B12 SERPL-MCNC: 953 PG/ML
WBC # FLD AUTO: 10.9 K/UL
WBC # FLD AUTO: 7.78 K/UL
WHITE BLOOD CELLS URINE: 2 /HPF

## 2024-05-18 ENCOUNTER — RX RENEWAL (OUTPATIENT)
Age: 64
End: 2024-05-18

## 2024-05-18 RX ORDER — SERTRALINE HYDROCHLORIDE 100 MG/1
100 TABLET, FILM COATED ORAL
Qty: 180 | Refills: 0 | Status: ACTIVE | COMMUNITY
Start: 2019-08-13 | End: 1900-01-01

## 2024-06-02 ENCOUNTER — NON-APPOINTMENT (OUTPATIENT)
Age: 64
End: 2024-06-02

## 2024-06-05 ENCOUNTER — EMERGENCY (EMERGENCY)
Facility: HOSPITAL | Age: 64
LOS: 1 days | Discharge: HOME | End: 2024-06-05
Attending: EMERGENCY MEDICINE
Payer: COMMERCIAL

## 2024-06-05 ENCOUNTER — APPOINTMENT (OUTPATIENT)
Dept: CARDIOLOGY | Facility: CLINIC | Age: 64
End: 2024-06-05
Payer: COMMERCIAL

## 2024-06-05 VITALS
DIASTOLIC BLOOD PRESSURE: 70 MMHG | TEMPERATURE: 97.7 F | BODY MASS INDEX: 18.66 KG/M2 | OXYGEN SATURATION: 98 % | SYSTOLIC BLOOD PRESSURE: 132 MMHG | WEIGHT: 102 LBS | HEART RATE: 81 BPM

## 2024-06-05 VITALS
HEIGHT: 62 IN | OXYGEN SATURATION: 98 % | SYSTOLIC BLOOD PRESSURE: 141 MMHG | RESPIRATION RATE: 17 BRPM | DIASTOLIC BLOOD PRESSURE: 81 MMHG | HEART RATE: 84 BPM | TEMPERATURE: 98 F | WEIGHT: 102.07 LBS

## 2024-06-05 VITALS
OXYGEN SATURATION: 96 % | TEMPERATURE: 99 F | HEART RATE: 68 BPM | RESPIRATION RATE: 18 BRPM | DIASTOLIC BLOOD PRESSURE: 85 MMHG | SYSTOLIC BLOOD PRESSURE: 149 MMHG

## 2024-06-05 DIAGNOSIS — Z13.228 ENCOUNTER FOR SCREENING FOR OTHER METABOLIC DISORDERS: ICD-10-CM

## 2024-06-05 DIAGNOSIS — L81.2 FRECKLES: ICD-10-CM

## 2024-06-05 DIAGNOSIS — M81.0 AGE-RELATED OSTEOPOROSIS W/OUT CURRENT PATHOLOGICAL FRACTURE: ICD-10-CM

## 2024-06-05 DIAGNOSIS — E78.5 HYPERLIPIDEMIA, UNSPECIFIED: ICD-10-CM

## 2024-06-05 DIAGNOSIS — Z12.11 ENCOUNTER FOR SCREENING FOR MALIGNANT NEOPLASM OF COLON: ICD-10-CM

## 2024-06-05 DIAGNOSIS — K21.9 GASTRO-ESOPHAGEAL REFLUX DISEASE W/OUT ESOPHAGITIS: ICD-10-CM

## 2024-06-05 DIAGNOSIS — C50.919 MALIGNANT NEOPLASM OF UNSPECIFIED SITE OF UNSPECIFIED FEMALE BREAST: ICD-10-CM

## 2024-06-05 DIAGNOSIS — Z12.39 ENCOUNTER FOR OTHER SCREENING FOR MALIGNANT NEOPLASM OF BREAST: ICD-10-CM

## 2024-06-05 DIAGNOSIS — Z00.00 ENCOUNTER FOR GENERAL ADULT MEDICAL EXAMINATION W/OUT ABNORMAL FINDINGS: ICD-10-CM

## 2024-06-05 DIAGNOSIS — R73.03 PREDIABETES.: ICD-10-CM

## 2024-06-05 DIAGNOSIS — R01.1 CARDIAC MURMUR, UNSPECIFIED: ICD-10-CM

## 2024-06-05 DIAGNOSIS — R91.8 OTHER NONSPECIFIC ABNORMAL FINDING OF LUNG FIELD: ICD-10-CM

## 2024-06-05 PROCEDURE — 99284 EMERGENCY DEPT VISIT MOD MDM: CPT

## 2024-06-05 PROCEDURE — 93000 ELECTROCARDIOGRAM COMPLETE: CPT

## 2024-06-05 PROCEDURE — 99396 PREV VISIT EST AGE 40-64: CPT

## 2024-06-05 RX ORDER — IBUPROFEN 200 MG
600 TABLET ORAL ONCE
Refills: 0 | Status: COMPLETED | OUTPATIENT
Start: 2024-06-05 | End: 2024-06-05

## 2024-06-05 RX ORDER — CYCLOBENZAPRINE HYDROCHLORIDE 10 MG/1
5 TABLET, FILM COATED ORAL ONCE
Refills: 0 | Status: COMPLETED | OUTPATIENT
Start: 2024-06-05 | End: 2024-06-05

## 2024-06-05 RX ADMIN — Medication 600 MILLIGRAM(S): at 16:21

## 2024-06-05 RX ADMIN — CYCLOBENZAPRINE HYDROCHLORIDE 5 MILLIGRAM(S): 10 TABLET, FILM COATED ORAL at 16:21

## 2024-06-05 NOTE — ED PROVIDER NOTE - PATIENT PORTAL LINK FT
You can access the FollowMyHealth Patient Portal offered by [facility] by registering at the following website: [email or web address]. By joining [employer]’s FollowMyHealth portal, you will also be able to view your health information using other applications (apps) compatible with our system.

## 2024-06-05 NOTE — HISTORY OF PRESENT ILLNESS
[FreeTextEntry1] : This is a 64 y/o female with a pmhx of CAD here today for an annual wellness exam. Patient feels well and has no acute concerns or complaints. Patient denies chest pain,   palpitations, dizziness, syncope, changes in bowel/bladder habits or appetite.pt with ocassional dyspnea
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
## 2024-06-05 NOTE — PHYSICAL EXAM
[Well Developed] : well developed [Well Nourished] : well nourished [No Acute Distress] : no acute distress [Normal Conjunctiva] : normal conjunctiva [Normal Venous Pressure] : normal venous pressure [No Carotid Bruit] : no carotid bruit [Normal S1, S2] : normal S1, S2 [No Rub] : no rub [No Gallop] : no gallop [Clear Lung Fields] : clear lung fields [Good Air Entry] : good air entry [No Respiratory Distress] : no respiratory distress  [Soft] : abdomen soft [Non Tender] : non-tender [No Masses/organomegaly] : no masses/organomegaly [Normal Bowel Sounds] : normal bowel sounds [Normal Gait] : normal gait [No Edema] : no edema [No Cyanosis] : no cyanosis [No Clubbing] : no clubbing [No Varicosities] : no varicosities [No Rash] : no rash [No Skin Lesions] : no skin lesions [Moves all extremities] : moves all extremities [No Focal Deficits] : no focal deficits [Normal Speech] : normal speech [Alert and Oriented] : alert and oriented [Normal memory] : normal memory [de-identified] : 1/6 systolic murmur apex  [de-identified] : stephanie

## 2024-06-05 NOTE — ED PROVIDER NOTE - PROGRESS NOTE DETAILS
Nathan Chang DO (PGY-1) Called to the bedside as patient is requesting to leave. Patient is pending CT. Notes her pain has not gotten any better, still midline Cervical pain.    The patient wishes to be discharged against medical advice. I have assessed the patient's mental status and  the patient has capacity to make this decision. I have explained the risks of leaving without full treatment, including severe disability and death, which the patient understands and is willing to accept. I have answered all of the patient's questions. I reiterated my medical opinion and advised the patient to return at any time. We discussed the further workup outside of the current visit and return precautions.  LORA paper signed and placed in paper chart.

## 2024-06-05 NOTE — ED PROVIDER NOTE - PHYSICAL EXAMINATION
General: well appearing, interactive, well nourished, no apparent distress, ncat  HEENT: EOMI, PERRLA, normal mucosa, normal oropharynx, no lesions on the lips or on oral mucosa, normal external ear. No salcedo signs or racoon eyes  Neck: +mild midline C-spine tenderness, supple, no lymphadenopathy  CV: RRR, normal S1 and S2 with no murmur, capillary refill less than two seconds  Resp: lungs CTA b/l, good aeration bilaterally, symmetric chest wall   Abd: non-distended, soft, non-tender  : no CVA tenderness  MSK: see HEENT, full range of motion in all extremities, no cyanosis, no edema, no clubbing, no immobility. Negative pelvic compression test.   Neuro: CN II-XII grossly intact, muscle strength 5/5 in all extremities, normal gait  Skin: no rashes, skin intact General: well appearing, interactive, well nourished, no apparent distress, ncat  HEENT: EOMI, PERRLA, normal mucosa, normal oropharynx, no lesions on the lips or on oral mucosa, normal external ear. No salcedo signs or racoon eyes  Neck: +mild midline C-spine tenderness, supple, no lymphadenopathy  CV: RRR, normal S1 and S2 with no murmur, capillary refill less than two seconds  Resp: lungs CTA b/l, good aeration bilaterally, symmetric chest wall   Abd: non-distended, soft, non-tender  : no CVA tenderness  MSK: see HEENT, full range of motion in all extremities, no cyanosis, no edema, no clubbing, no immobility. Negative pelvic compression test.   Neuro: CN II-XII grossly intact, muscle strength 5/5 in all extremities, normal gait  Skin: no rashes, skin intact     Attending note.  Patient is alert no acute distress.  Head is normocephalic and atraumatic.  Patient is in a cervical collar and through collar has midline tenderness.  Chest and ribs are nontender.  Abdomen is soft, nontender and without guarding or rebound.  Patient has no thoracic tenderness but there is midline lumbar tenderness and paralumbar tenderness bilaterally.  Pelvis and hips are nontender.  She is moving extremities without pain or tenderness.  Sensation is intact and normal.  Neurologic examination is grossly intact.
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
## 2024-06-11 LAB
25(OH)D3 SERPL-MCNC: 50.1 NG/ML
ALBUMIN SERPL ELPH-MCNC: 4.8 G/DL
ALP BLD-CCNC: 73 U/L
ALT SERPL-CCNC: 13 U/L
ANION GAP SERPL CALC-SCNC: 14 MMOL/L
APPEARANCE: CLEAR
AST SERPL-CCNC: 24 U/L
BACTERIA: NEGATIVE /HPF
BASOPHILS # BLD AUTO: 0.06 K/UL
BASOPHILS NFR BLD AUTO: 0.7 %
BILIRUB DIRECT SERPL-MCNC: 0.1 MG/DL
BILIRUB INDIRECT SERPL-MCNC: 0.2 MG/DL
BILIRUB SERPL-MCNC: 0.3 MG/DL
BILIRUBIN URINE: NEGATIVE
BLOOD URINE: NEGATIVE
BUN SERPL-MCNC: 15 MG/DL
CALCIUM SERPL-MCNC: 9.6 MG/DL
CAST: 0 /LPF
CHLORIDE SERPL-SCNC: 95 MMOL/L
CHOLEST SERPL-MCNC: 193 MG/DL
CK SERPL-CCNC: 118 U/L
CO2 SERPL-SCNC: 27 MMOL/L
COLOR: YELLOW
CREAT SERPL-MCNC: 0.85 MG/DL
EGFR: 76 ML/MIN/1.73M2
EOSINOPHIL # BLD AUTO: 0.38 K/UL
EOSINOPHIL NFR BLD AUTO: 4.3 %
EPITHELIAL CELLS: 1 /HPF
ESTIMATED AVERAGE GLUCOSE: 123 MG/DL
GLUCOSE QUALITATIVE U: NEGATIVE MG/DL
GLUCOSE SERPL-MCNC: 71 MG/DL
HBA1C MFR BLD HPLC: 5.9 %
HCT VFR BLD CALC: 36.3 %
HDLC SERPL-MCNC: 54 MG/DL
HGB BLD-MCNC: 12 G/DL
IMM GRANULOCYTES NFR BLD AUTO: 0.2 %
KETONES URINE: NEGATIVE MG/DL
LDLC SERPL CALC-MCNC: 121 MG/DL
LEUKOCYTE ESTERASE URINE: ABNORMAL
LYMPHOCYTES # BLD AUTO: 3.03 K/UL
LYMPHOCYTES NFR BLD AUTO: 34 %
MAGNESIUM SERPL-MCNC: 2.3 MG/DL
MAN DIFF?: NORMAL
MCHC RBC-ENTMCNC: 31.1 PG
MCHC RBC-ENTMCNC: 33.1 GM/DL
MCV RBC AUTO: 94 FL
MICROSCOPIC-UA: NORMAL
MONOCYTES # BLD AUTO: 0.78 K/UL
MONOCYTES NFR BLD AUTO: 8.8 %
NEUTROPHILS # BLD AUTO: 4.64 K/UL
NEUTROPHILS NFR BLD AUTO: 52 %
NITRITE URINE: NEGATIVE
NONHDLC SERPL-MCNC: 139 MG/DL
PH URINE: 7
PLATELET # BLD AUTO: 238 K/UL
POTASSIUM SERPL-SCNC: 4.1 MMOL/L
PROT SERPL-MCNC: 7.7 G/DL
PROTEIN URINE: NEGATIVE MG/DL
RBC # BLD: 3.86 M/UL
RBC # FLD: 13 %
RED BLOOD CELLS URINE: 0 /HPF
REVIEW: NORMAL
SODIUM SERPL-SCNC: 136 MMOL/L
SPECIFIC GRAVITY URINE: 1.01
T3FREE SERPL-MCNC: 2.93 PG/ML
T4 FREE SERPL-MCNC: 1.2 NG/DL
TRIGL SERPL-MCNC: 101 MG/DL
TSH SERPL-ACNC: 1.19 UIU/ML
UROBILINOGEN URINE: 0.2 MG/DL
WBC # FLD AUTO: 8.91 K/UL
WHITE BLOOD CELLS URINE: 1 /HPF

## 2024-06-21 ENCOUNTER — NON-APPOINTMENT (OUTPATIENT)
Age: 64
End: 2024-06-21

## 2024-06-24 ENCOUNTER — NON-APPOINTMENT (OUTPATIENT)
Age: 64
End: 2024-06-24

## 2024-06-25 ENCOUNTER — APPOINTMENT (OUTPATIENT)
Dept: INTERNAL MEDICINE | Facility: CLINIC | Age: 64
End: 2024-06-25
Payer: COMMERCIAL

## 2024-06-25 VITALS
WEIGHT: 102 LBS | DIASTOLIC BLOOD PRESSURE: 80 MMHG | OXYGEN SATURATION: 93 % | HEIGHT: 62 IN | TEMPERATURE: 98.4 F | BODY MASS INDEX: 18.77 KG/M2 | SYSTOLIC BLOOD PRESSURE: 140 MMHG | HEART RATE: 82 BPM

## 2024-06-25 VITALS — DIASTOLIC BLOOD PRESSURE: 74 MMHG | SYSTOLIC BLOOD PRESSURE: 138 MMHG

## 2024-06-25 VITALS — HEART RATE: 82 BPM | OXYGEN SATURATION: 97 %

## 2024-06-25 DIAGNOSIS — T78.2XXA ANAPHYLACTIC SHOCK, UNSPECIFIED, INITIAL ENCOUNTER: ICD-10-CM

## 2024-06-25 DIAGNOSIS — M48.02 SPINAL STENOSIS, CERVICAL REGION: ICD-10-CM

## 2024-06-25 DIAGNOSIS — J43.9 EMPHYSEMA, UNSPECIFIED: ICD-10-CM

## 2024-06-25 DIAGNOSIS — F17.200 NICOTINE DEPENDENCE, UNSPECIFIED, UNCOMPLICATED: ICD-10-CM

## 2024-06-25 DIAGNOSIS — F41.9 ANXIETY DISORDER, UNSPECIFIED: ICD-10-CM

## 2024-06-25 DIAGNOSIS — I25.10 ATHEROSCLEROTIC HEART DISEASE OF NATIVE CORONARY ARTERY W/OUT ANGINA PECTORIS: ICD-10-CM

## 2024-06-25 PROCEDURE — 99204 OFFICE O/P NEW MOD 45 MIN: CPT

## 2024-06-25 RX ORDER — ASPIRIN 81 MG/1
81 TABLET, COATED ORAL
Qty: 30 | Refills: 5 | Status: DISCONTINUED | COMMUNITY
Start: 2024-06-05 | End: 2024-06-25

## 2024-06-25 RX ORDER — METHYLPREDNISOLONE 4 MG/1
4 TABLET ORAL
Qty: 1 | Refills: 0 | Status: ACTIVE | COMMUNITY
Start: 2024-06-25 | End: 1900-01-01

## 2024-06-25 RX ORDER — OMEPRAZOLE 40 MG/1
40 CAPSULE, DELAYED RELEASE ORAL DAILY
Qty: 90 | Refills: 1 | Status: DISCONTINUED | COMMUNITY
Start: 2022-03-09 | End: 2024-06-25

## 2024-06-25 RX ORDER — EPINEPHRINE 0.3 MG/.3ML
0.3 INJECTION INTRAMUSCULAR
Qty: 1 | Refills: 2 | Status: ACTIVE | COMMUNITY
Start: 2024-06-25 | End: 1900-01-01

## 2024-06-26 PROBLEM — F17.200 SMOKER: Status: ACTIVE | Noted: 2020-09-14

## 2024-06-26 PROBLEM — T78.2XXA ANAPHYLACTIC REACTION: Status: ACTIVE | Noted: 2024-06-25

## 2024-06-26 PROBLEM — M48.02 CERVICAL STENOSIS OF SPINE: Status: ACTIVE | Noted: 2023-01-13

## 2024-06-26 PROBLEM — I25.10 CAD (CORONARY ARTERY DISEASE): Status: ACTIVE | Noted: 2019-09-03

## 2024-06-26 RX ORDER — PRAVASTATIN SODIUM 80 MG/1
80 TABLET ORAL
Qty: 90 | Refills: 1 | Status: DISCONTINUED | COMMUNITY
Start: 2019-07-12 | End: 2024-06-26

## 2024-07-01 ENCOUNTER — NON-APPOINTMENT (OUTPATIENT)
Age: 64
End: 2024-07-01

## 2024-07-01 RX ORDER — ALIROCUMAB 150 MG/ML
150 INJECTION, SOLUTION SUBCUTANEOUS
Qty: 1 | Refills: 0 | Status: ACTIVE | COMMUNITY
Start: 2024-07-01 | End: 1900-01-01

## 2024-07-03 ENCOUNTER — TRANSCRIPTION ENCOUNTER (OUTPATIENT)
Age: 64
End: 2024-07-03

## 2024-08-06 ENCOUNTER — APPOINTMENT (OUTPATIENT)
Dept: INTERNAL MEDICINE | Facility: CLINIC | Age: 64
End: 2024-08-06

## 2024-08-06 PROBLEM — L29.9 PRURITUS: Status: ACTIVE | Noted: 2024-08-06

## 2024-08-06 PROCEDURE — G2211 COMPLEX E/M VISIT ADD ON: CPT

## 2024-08-06 PROCEDURE — 99213 OFFICE O/P EST LOW 20 MIN: CPT

## 2024-08-06 NOTE — PHYSICAL EXAM
[No Acute Distress] : no acute distress [Well Nourished] : well nourished [Well Developed] : well developed [Well-Appearing] : well-appearing [Normal Sclera/Conjunctiva] : normal sclera/conjunctiva [PERRL] : pupils equal round and reactive to light [EOMI] : extraocular movements intact [Normal Outer Ear/Nose] : the outer ears and nose were normal in appearance [Normal Oropharynx] : the oropharynx was normal [No JVD] : no jugular venous distention [No Lymphadenopathy] : no lymphadenopathy [Supple] : supple [Thyroid Normal, No Nodules] : the thyroid was normal and there were no nodules present [No Respiratory Distress] : no respiratory distress  [Clear to Auscultation] : lungs were clear to auscultation bilaterally [Normal Rate] : normal rate  [Regular Rhythm] : with a regular rhythm [Normal S1, S2] : normal S1 and S2 [No Murmur] : no murmur heard [No Carotid Bruits] : no carotid bruits [No Abdominal Bruit] : a ~M bruit was not heard ~T in the abdomen [No Varicosities] : no varicosities [Pedal Pulses Present] : the pedal pulses are present [No Edema] : there was no peripheral edema [No Palpable Aorta] : no palpable aorta [No Extremity Clubbing/Cyanosis] : no extremity clubbing/cyanosis [Soft] : abdomen soft [Non Tender] : non-tender [Non-distended] : non-distended [No Masses] : no abdominal mass palpated [No HSM] : no HSM [Normal Bowel Sounds] : normal bowel sounds [Normal Posterior Cervical Nodes] : no posterior cervical lymphadenopathy [Normal Anterior Cervical Nodes] : no anterior cervical lymphadenopathy [No CVA Tenderness] : no CVA  tenderness [No Spinal Tenderness] : no spinal tenderness [No Joint Swelling] : no joint swelling [Grossly Normal Strength/Tone] : grossly normal strength/tone [No Rash] : no rash [Coordination Grossly Intact] : coordination grossly intact [No Focal Deficits] : no focal deficits [Normal Gait] : normal gait [Normal Affect] : the affect was normal [Normal Insight/Judgement] : insight and judgment were intact [No Accessory Muscle Use] : no accessory muscle use [Speech Grossly Normal] : speech grossly normal [Memory Grossly Normal] : memory grossly normal [Alert and Oriented x3] : oriented to person, place, and time

## 2024-08-06 NOTE — HISTORY OF PRESENT ILLNESS
[Home] : at home, [unfilled] , at the time of the visit. [Medical Office: ([facility])___] : at the medical office located in  [Verbal consent obtained from patient] : the patient, [unfilled] [FreeTextEntry1] : allergy f/u [de-identified] : This is a 63 y/o female with a pmhx of DDD/neck pain, CAD here today for allergic reaction f/u. Pt had allergic reaction in 6/2024 unclear culprit, ?contrast, diclofenac, dragon fruit etc? Pt reports seeing an allergist Dr. Florencio Lopez who did allergy tests and did not test positive for anything. Some childhood vaccine antibody was low so she was revaccinated for mmr, dtap.   Pt says she used diclofenac yesterday starting getting skin itching on her arms but denied any hives, rash, trouble breathing, lip.tongue/swelling. She took 2 Benadryls and symptoms quickly resolved, did not have to use steroids or epi pen and is completely back to her baseline.  She had taken other NDAIDS, Aleve and Mortin for back pain and does not get any reactions when taking them. Overall, pt feels well. Denies chest pain, SOB, MORENO, dizziness, diaphoresis, palpitations, LE swelling, orthopnea, syncope, n/v, headache.

## 2024-08-06 NOTE — HEALTH RISK ASSESSMENT
[0] : 2) Feeling down, depressed, or hopeless: Not at all (0) [PHQ-2 Negative - No further assessment needed] : PHQ-2 Negative - No further assessment needed [Current] : Current [[id number]] : 0

## 2024-08-06 NOTE — PHYSICAL EXAM
[No Acute Distress] : no acute distress [Well Nourished] : well nourished [Well Developed] : well developed [Well-Appearing] : well-appearing [Normal Sclera/Conjunctiva] : normal sclera/conjunctiva [PERRL] : pupils equal round and reactive to light [EOMI] : extraocular movements intact [Normal Outer Ear/Nose] : the outer ears and nose were normal in appearance [Normal Oropharynx] : the oropharynx was normal [No Lymphadenopathy] : no lymphadenopathy [No JVD] : no jugular venous distention [Supple] : supple [Thyroid Normal, No Nodules] : the thyroid was normal and there were no nodules present [No Respiratory Distress] : no respiratory distress  [Clear to Auscultation] : lungs were clear to auscultation bilaterally [Normal Rate] : normal rate  [Regular Rhythm] : with a regular rhythm [Normal S1, S2] : normal S1 and S2 [No Murmur] : no murmur heard [No Carotid Bruits] : no carotid bruits [No Abdominal Bruit] : a ~M bruit was not heard ~T in the abdomen [No Varicosities] : no varicosities [Pedal Pulses Present] : the pedal pulses are present [No Edema] : there was no peripheral edema [No Palpable Aorta] : no palpable aorta [No Extremity Clubbing/Cyanosis] : no extremity clubbing/cyanosis [Soft] : abdomen soft [Non Tender] : non-tender [Non-distended] : non-distended [No Masses] : no abdominal mass palpated [No HSM] : no HSM [Normal Bowel Sounds] : normal bowel sounds [Normal Posterior Cervical Nodes] : no posterior cervical lymphadenopathy [Normal Anterior Cervical Nodes] : no anterior cervical lymphadenopathy [No CVA Tenderness] : no CVA  tenderness [No Spinal Tenderness] : no spinal tenderness [No Joint Swelling] : no joint swelling [Grossly Normal Strength/Tone] : grossly normal strength/tone [No Rash] : no rash [Coordination Grossly Intact] : coordination grossly intact [No Focal Deficits] : no focal deficits [Normal Gait] : normal gait [Normal Affect] : the affect was normal [Normal Insight/Judgement] : insight and judgment were intact [No Accessory Muscle Use] : no accessory muscle use [Speech Grossly Normal] : speech grossly normal [Memory Grossly Normal] : memory grossly normal [Alert and Oriented x3] : oriented to person, place, and time

## 2024-08-06 NOTE — HEALTH RISK ASSESSMENT
[0] : 1) Little interest or pleasure doing things: Not at all (0) [PHQ-2 Negative - No further assessment needed] : PHQ-2 Negative - No further assessment needed [Current] : Current [[id number]] : 0

## 2024-08-06 NOTE — HISTORY OF PRESENT ILLNESS
[Home] : at home, [unfilled] , at the time of the visit. [Medical Office: ([facility])___] : at the medical office located in  [Verbal consent obtained from patient] : the patient, [unfilled] [FreeTextEntry1] : allergy f/u [de-identified] : This is a 63 y/o female with a pmhx of DDD/neck pain, CAD here today for allergic reaction f/u. Pt had allergic reaction in 6/2024 unclear culprit, ?contrast, diclofenac, dragon fruit etc? Pt reports seeing an allergist Dr. Florencio Lopez who did allergy tests and did not test positive for anything. Some childhood vaccine antibody was low so she was revaccinated for mmr, dtap.   Pt says she used diclofenac yesterday starting getting skin itching on her arms but denied any hives, rash, trouble breathing, lip.tongue/swelling. She took 2 Benadryls and symptoms quickly resolved, did not have to use steroids or epi pen and is completely back to her baseline.  She had taken other NDAIDS, Aleve and Mortin for back pain and does not get any reactions when taking them. Overall, pt feels well. Denies chest pain, SOB, MORENO, dizziness, diaphoresis, palpitations, LE swelling, orthopnea, syncope, n/v, headache.

## 2024-08-06 NOTE — ADDENDUM
[FreeTextEntry1] : This note was written by Madison Swan on 08/06/2024 acting as medical scribe for Dr. Mayur Juarez. I, Dr. Mayru Juarez, have read and attest that all the information, medical decision making and discharge instructions within are true and accurate.

## 2024-08-06 NOTE — ADDENDUM
[FreeTextEntry1] : This note was written by Madison Swan on 08/06/2024 acting as medical scribe for Dr. Mayur Juarez. I, Dr. Mayur Juarez, have read and attest that all the information, medical decision making and discharge instructions within are true and accurate.

## 2024-08-06 NOTE — HEALTH RISK ASSESSMENT
[0] : 1) Little interest or pleasure doing things: Not at all (0) [PHQ-2 Negative - No further assessment needed] : PHQ-2 Negative - No further assessment needed [Current] : Current [[id number]] : 0

## 2024-08-06 NOTE — HISTORY OF PRESENT ILLNESS
[Home] : at home, [unfilled] , at the time of the visit. [Medical Office: ([facility])___] : at the medical office located in  [Verbal consent obtained from patient] : the patient, [unfilled] [FreeTextEntry1] : allergy f/u [de-identified] : This is a 63 y/o female with a pmhx of DDD/neck pain, CAD here today for allergic reaction f/u. Pt had allergic reaction in 6/2024 unclear culprit, ?contrast, diclofenac, dragon fruit etc? Pt reports seeing an allergist Dr. Florencio Lopez who did allergy tests and did not test positive for anything. Some childhood vaccine antibody was low so she was revaccinated for mmr, dtap.   Pt says she used diclofenac yesterday starting getting skin itching on her arms but denied any hives, rash, trouble breathing, lip.tongue/swelling. She took 2 Benadryls and symptoms quickly resolved, did not have to use steroids or epi pen and is completely back to her baseline.  She had taken other NDAIDS, Aleve and Mortin for back pain and does not get any reactions when taking them. Overall, pt feels well. Denies chest pain, SOB, MORENO, dizziness, diaphoresis, palpitations, LE swelling, orthopnea, syncope, n/v, headache.

## 2024-09-23 ENCOUNTER — RX RENEWAL (OUTPATIENT)
Age: 64
End: 2024-09-23

## 2024-09-24 ENCOUNTER — TRANSCRIPTION ENCOUNTER (OUTPATIENT)
Age: 64
End: 2024-09-24
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
## 2024-10-07 ENCOUNTER — RX RENEWAL (OUTPATIENT)
Age: 64
End: 2024-10-07

## 2024-10-12 NOTE — PRE-ANESTHESIA EVALUATION ADULT - LAST ECHOCARDIOGRAM
Strictly soft diet (mashed potatoes, soup, yogurt, jello, pudding, etc) should be followed for the first 2 weeks after the surgery.  Avoiding hard, crunchy foods, (tacos, pizza crusts, pretzels, chips, etc) for 2 weeks after the procedure may help prevent bleeding from the surgical area.  Drink plenty of fluids to avoid dehydration.  If there is pain with eating/drinking, try administering the pain medicine 20-30 minutes before meals. 9/21 EF 59 %

## 2024-11-07 ENCOUNTER — TRANSCRIPTION ENCOUNTER (OUTPATIENT)
Age: 64
End: 2024-11-07

## 2024-11-08 ENCOUNTER — TRANSCRIPTION ENCOUNTER (OUTPATIENT)
Age: 64
End: 2024-11-08

## 2024-11-25 ENCOUNTER — OUTPATIENT (OUTPATIENT)
Dept: OUTPATIENT SERVICES | Facility: HOSPITAL | Age: 64
LOS: 1 days | Discharge: HOME | End: 2024-11-25

## 2024-11-25 DIAGNOSIS — C50.919 MALIGNANT NEOPLASM OF UNSPECIFIED SITE OF UNSPECIFIED FEMALE BREAST: ICD-10-CM

## 2024-12-03 ENCOUNTER — RESULT REVIEW (OUTPATIENT)
Age: 64
End: 2024-12-03

## 2024-12-03 ENCOUNTER — APPOINTMENT (OUTPATIENT)
Dept: HEMATOLOGY ONCOLOGY | Facility: CLINIC | Age: 64
End: 2024-12-03
Payer: COMMERCIAL

## 2024-12-03 VITALS
BODY MASS INDEX: 20.24 KG/M2 | SYSTOLIC BLOOD PRESSURE: 157 MMHG | RESPIRATION RATE: 16 BRPM | OXYGEN SATURATION: 99 % | TEMPERATURE: 97.1 F | HEART RATE: 108 BPM | WEIGHT: 110.67 LBS | DIASTOLIC BLOOD PRESSURE: 84 MMHG

## 2024-12-03 DIAGNOSIS — C50.919 MALIGNANT NEOPLASM OF UNSPECIFIED SITE OF UNSPECIFIED FEMALE BREAST: ICD-10-CM

## 2024-12-03 LAB
BASOPHILS # BLD AUTO: 0.05 K/UL — SIGNIFICANT CHANGE UP (ref 0–0.2)
BASOPHILS NFR BLD AUTO: 0.6 % — SIGNIFICANT CHANGE UP (ref 0–2)
EOSINOPHIL # BLD AUTO: 0.4 K/UL — SIGNIFICANT CHANGE UP (ref 0–0.5)
EOSINOPHIL NFR BLD AUTO: 4.7 % — SIGNIFICANT CHANGE UP (ref 0–6)
HCT VFR BLD CALC: 34.9 % — SIGNIFICANT CHANGE UP (ref 34.5–45)
HGB BLD-MCNC: 11.9 G/DL — SIGNIFICANT CHANGE UP (ref 11.5–15.5)
IMM GRANULOCYTES NFR BLD AUTO: 0.2 % — SIGNIFICANT CHANGE UP (ref 0–0.9)
LYMPHOCYTES # BLD AUTO: 3.27 K/UL — SIGNIFICANT CHANGE UP (ref 1–3.3)
LYMPHOCYTES # BLD AUTO: 38.3 % — SIGNIFICANT CHANGE UP (ref 13–44)
MCHC RBC-ENTMCNC: 31.4 PG — SIGNIFICANT CHANGE UP (ref 27–34)
MCHC RBC-ENTMCNC: 34.1 G/DL — SIGNIFICANT CHANGE UP (ref 32–36)
MCV RBC AUTO: 92.1 FL — SIGNIFICANT CHANGE UP (ref 80–100)
MONOCYTES # BLD AUTO: 0.72 K/UL — SIGNIFICANT CHANGE UP (ref 0–0.9)
MONOCYTES NFR BLD AUTO: 8.4 % — SIGNIFICANT CHANGE UP (ref 2–14)
NEUTROPHILS # BLD AUTO: 4.08 K/UL — SIGNIFICANT CHANGE UP (ref 1.8–7.4)
NEUTROPHILS NFR BLD AUTO: 47.8 % — SIGNIFICANT CHANGE UP (ref 43–77)
NRBC # BLD: 0 /100 WBCS — SIGNIFICANT CHANGE UP (ref 0–0)
NRBC BLD-RTO: 0 /100 WBCS — SIGNIFICANT CHANGE UP (ref 0–0)
PLATELET # BLD AUTO: 222 K/UL — SIGNIFICANT CHANGE UP (ref 150–400)
RBC # BLD: 3.79 M/UL — LOW (ref 3.8–5.2)
RBC # FLD: 12.5 % — SIGNIFICANT CHANGE UP (ref 10.3–14.5)
WBC # BLD: 8.54 K/UL — SIGNIFICANT CHANGE UP (ref 3.8–10.5)
WBC # FLD AUTO: 8.54 K/UL — SIGNIFICANT CHANGE UP (ref 3.8–10.5)

## 2024-12-03 PROCEDURE — 99213 OFFICE O/P EST LOW 20 MIN: CPT

## 2024-12-04 LAB
ALBUMIN SERPL ELPH-MCNC: 4.7 G/DL
ALP BLD-CCNC: 76 U/L
ALT SERPL-CCNC: 10 U/L
ANION GAP SERPL CALC-SCNC: 12 MMOL/L
AST SERPL-CCNC: 21 U/L
BILIRUB SERPL-MCNC: <0.2 MG/DL
BUN SERPL-MCNC: 16 MG/DL
CALCIUM SERPL-MCNC: 9.7 MG/DL
CEA SERPL-MCNC: 9.3 NG/ML
CHLORIDE SERPL-SCNC: 99 MMOL/L
CO2 SERPL-SCNC: 28 MMOL/L
CREAT SERPL-MCNC: 0.97 MG/DL
EGFR: 65 ML/MIN/1.73M2
GLUCOSE SERPL-MCNC: 99 MG/DL
POTASSIUM SERPL-SCNC: 4.8 MMOL/L
PROT SERPL-MCNC: 7.4 G/DL
SODIUM SERPL-SCNC: 139 MMOL/L

## 2024-12-05 LAB — CANCER AG27-29 SERPL-ACNC: 48.1 U/ML

## 2025-01-02 ENCOUNTER — TRANSCRIPTION ENCOUNTER (OUTPATIENT)
Age: 65
End: 2025-01-02

## 2025-01-08 RX ORDER — PRAVASTATIN SODIUM 80 MG/1
80 TABLET ORAL DAILY
Qty: 90 | Refills: 1 | Status: ACTIVE | COMMUNITY
Start: 2025-01-08 | End: 1900-01-01

## 2025-02-12 ENCOUNTER — TRANSCRIPTION ENCOUNTER (OUTPATIENT)
Age: 65
End: 2025-02-12

## 2025-04-04 ENCOUNTER — RX RENEWAL (OUTPATIENT)
Age: 65
End: 2025-04-04

## 2025-05-06 ENCOUNTER — TRANSCRIPTION ENCOUNTER (OUTPATIENT)
Age: 65
End: 2025-05-06

## 2025-05-06 ENCOUNTER — RX RENEWAL (OUTPATIENT)
Age: 65
End: 2025-05-06

## 2025-05-08 NOTE — H&P ADULT - REASON FOR ADMISSION
Subjective:     Patient ID: Shireen Rueda is a 45 y.o. female.    [facility] Clinical Progress Notes      Specialized Services Documentation  Therapist must complete separate progress note for each specific clinical activity in which the individual participated during the day.     EDUCATION THERAPY    Visit Time    Visit Start Time: 0830  Visit Stop Time: 0930  Total Visit Duration: 60 minutes      [id number]    Shireen Rueda actively shared in morning assessment and goal review. Presented as Receptive related to readiness to learn. Shireen Rueda  did complete goal from last treatment day identifying gaining responsibility. Shireen Rueda did not present with any barriers to learning. Throughout morning group, Shireen Rueda participated in journaling prompt and guided meditation. Shireen Rueda made good progress toward goal. Continue education group to assess willingness to engage, assess transfer of knowledge, and participate in skill development.    This group was facilitated virtually in a private office using HIPAA Compliant and Approved [employer] Teams.  Shireen Rueda consented to the use of tele-video modality of treatment.    Tx Plan Objective: 1.1,1.2, Therapist: AVERY Britton      GROUP PSYCHOTHERAPY    Visit Time    Visit Start Time: 1330  Visit Stop Time: 1430  Total Visit Duration: 60 minutes      [id number]    Shireen Rueda participated actively in psychotherapy group.  This was observedConsistent throughout the treatment day. They were engaged in learning related to Illness, Medication, Aftercare, and Wellness Tools. Staff utilized Verbal, Written, A/V, and Demonstration teaching methods.  Shireen Rueda shared area of learning and set a goal for outside of program to put together a tray.  Shireen Rueda was able to share items explored during the day.  Ended session with staff led exercise a grounding technique.  Shireen Rueda demonstrated good progress toward goal.  Continue group psychotherapy to actively  Abdominal pain practice learned skills and encourage transfer of knowledge to unstructured time.        This group was facilitated virtually in a private office using HIPAA Compliant and Approved [employer] Teams.  Shireen Rueda consented to the use of tele-video modality of treatment.     Tx Plan Objective: 1.1,1.2,1.3,1.4 Therapist: AVERY Britton

## 2025-06-14 ENCOUNTER — RX RENEWAL (OUTPATIENT)
Age: 65
End: 2025-06-14

## 2025-07-01 ENCOUNTER — APPOINTMENT (OUTPATIENT)
Dept: CARDIOLOGY | Facility: CLINIC | Age: 65
End: 2025-07-01
Payer: MEDICARE

## 2025-07-01 VITALS
BODY MASS INDEX: 20.06 KG/M2 | TEMPERATURE: 97.6 F | WEIGHT: 109 LBS | DIASTOLIC BLOOD PRESSURE: 80 MMHG | OXYGEN SATURATION: 97 % | HEIGHT: 62 IN | HEART RATE: 83 BPM | SYSTOLIC BLOOD PRESSURE: 120 MMHG

## 2025-07-01 PROBLEM — Z53.20: Status: ACTIVE | Noted: 2025-07-01

## 2025-07-01 PROCEDURE — G2211 COMPLEX E/M VISIT ADD ON: CPT

## 2025-07-01 PROCEDURE — 99204 OFFICE O/P NEW MOD 45 MIN: CPT

## 2025-07-01 PROCEDURE — 93000 ELECTROCARDIOGRAM COMPLETE: CPT

## 2025-07-01 PROCEDURE — 90677 PCV20 VACCINE IM: CPT

## 2025-07-01 PROCEDURE — G0009: CPT

## 2025-07-01 RX ORDER — PREGABALIN 100 MG/1
100 CAPSULE ORAL
Refills: 0 | Status: ACTIVE | COMMUNITY

## 2025-07-01 RX ORDER — ZOSTER VACCINE RECOMBINANT, ADJUVANTED 50 MCG/0.5
50 KIT INTRAMUSCULAR
Qty: 1 | Refills: 0 | Status: ACTIVE | COMMUNITY
Start: 2025-07-01 | End: 1900-01-01

## 2025-07-07 ENCOUNTER — APPOINTMENT (OUTPATIENT)
Dept: CARDIOLOGY | Facility: CLINIC | Age: 65
End: 2025-07-07
Payer: MEDICARE

## 2025-07-07 PROBLEM — R89.9 ABNORMAL LABORATORY TEST RESULT: Status: ACTIVE | Noted: 2025-07-07

## 2025-07-07 LAB
25(OH)D3 SERPL-MCNC: 48.6 NG/ML
ALBUMIN SERPL ELPH-MCNC: 4.3 G/DL
ALP BLD-CCNC: 143 U/L
ALT SERPL-CCNC: 15 U/L
ANION GAP SERPL CALC-SCNC: 13 MMOL/L
APPEARANCE: CLEAR
AST SERPL-CCNC: 27 U/L
BACTERIA: NEGATIVE /HPF
BASOPHILS # BLD AUTO: 0.05 K/UL
BASOPHILS NFR BLD AUTO: 0.7 %
BILIRUB DIRECT SERPL-MCNC: <0.08 MG/DL
BILIRUB INDIRECT SERPL-MCNC: 0.1 MG/DL
BILIRUB SERPL-MCNC: 0.2 MG/DL
BILIRUBIN URINE: NEGATIVE
BLOOD URINE: NEGATIVE
BUN SERPL-MCNC: 16 MG/DL
CALCIUM SERPL-MCNC: 9.7 MG/DL
CAST: 0 /LPF
CHLORIDE SERPL-SCNC: 98 MMOL/L
CHOLEST SERPL-MCNC: 186 MG/DL
CK SERPL-CCNC: 71 U/L
CO2 SERPL-SCNC: 24 MMOL/L
COLOR: YELLOW
CREAT SERPL-MCNC: 0.87 MG/DL
EGFRCR SERPLBLD CKD-EPI 2021: 74 ML/MIN/1.73M2
EOSINOPHIL # BLD AUTO: 0.32 K/UL
EOSINOPHIL NFR BLD AUTO: 4.4 %
EPITHELIAL CELLS: 2 /HPF
ESTIMATED AVERAGE GLUCOSE: 117 MG/DL
FERRITIN SERPL-MCNC: 74 NG/ML
FOLATE SERPL-MCNC: 3.6 NG/ML
GLUCOSE QUALITATIVE U: NEGATIVE MG/DL
GLUCOSE SERPL-MCNC: 95 MG/DL
HBA1C MFR BLD HPLC: 5.7 %
HCT VFR BLD CALC: 30.2 %
HDLC SERPL-MCNC: 55 MG/DL
HGB BLD-MCNC: 9.5 G/DL
IMM GRANULOCYTES NFR BLD AUTO: 0.3 %
IRON SATN MFR SERPL: 9 %
IRON SERPL-MCNC: 34 UG/DL
KETONES URINE: NEGATIVE MG/DL
LDLC SERPL-MCNC: 111 MG/DL
LEUKOCYTE ESTERASE URINE: ABNORMAL
LYMPHOCYTES # BLD AUTO: 3.05 K/UL
LYMPHOCYTES NFR BLD AUTO: 41.6 %
MAGNESIUM SERPL-MCNC: 2.3 MG/DL
MAN DIFF?: NORMAL
MCHC RBC-ENTMCNC: 30.3 PG
MCHC RBC-ENTMCNC: 31.5 G/DL
MCV RBC AUTO: 96.2 FL
MICROSCOPIC-UA: NORMAL
MONOCYTES # BLD AUTO: 0.67 K/UL
MONOCYTES NFR BLD AUTO: 9.1 %
NEUTROPHILS # BLD AUTO: 3.22 K/UL
NEUTROPHILS NFR BLD AUTO: 43.9 %
NITRITE URINE: NEGATIVE
NONHDLC SERPL-MCNC: 131 MG/DL
PH URINE: 7
PLATELET # BLD AUTO: 343 K/UL
POTASSIUM SERPL-SCNC: 5 MMOL/L
PROT SERPL-MCNC: 7.2 G/DL
PROTEIN URINE: NEGATIVE MG/DL
RBC # BLD: 3.14 M/UL
RBC # FLD: 13.4 %
RED BLOOD CELLS URINE: 0 /HPF
REVIEW: NORMAL
SODIUM SERPL-SCNC: 136 MMOL/L
SPECIFIC GRAVITY URINE: 1.01
T4 FREE SERPL-MCNC: 1 NG/DL
TIBC SERPL-MCNC: 371 UG/DL
TRIGL SERPL-MCNC: 111 MG/DL
TSH SERPL-ACNC: 1 UIU/ML
UIBC SERPL-MCNC: 337 UG/DL
UROBILINOGEN URINE: 0.2 MG/DL
VIT B12 SERPL-MCNC: 600 PG/ML
WBC # FLD AUTO: 7.33 K/UL
WHITE BLOOD CELLS URINE: 0 /HPF

## 2025-07-07 PROCEDURE — 93306 TTE W/DOPPLER COMPLETE: CPT

## 2025-07-18 ENCOUNTER — APPOINTMENT (OUTPATIENT)
Dept: CT IMAGING | Facility: IMAGING CENTER | Age: 65
End: 2025-07-18

## 2025-07-18 ENCOUNTER — OUTPATIENT (OUTPATIENT)
Dept: OUTPATIENT SERVICES | Facility: HOSPITAL | Age: 65
LOS: 1 days | End: 2025-07-18
Payer: MEDICARE

## 2025-07-18 DIAGNOSIS — F17.200 NICOTINE DEPENDENCE, UNSPECIFIED, UNCOMPLICATED: ICD-10-CM

## 2025-07-18 PROCEDURE — 71271 CT THORAX LUNG CANCER SCR C-: CPT | Mod: 26

## 2025-07-18 PROCEDURE — 71271 CT THORAX LUNG CANCER SCR C-: CPT

## 2025-07-23 ENCOUNTER — TRANSCRIPTION ENCOUNTER (OUTPATIENT)
Age: 65
End: 2025-07-23

## 2025-07-30 ENCOUNTER — NON-APPOINTMENT (OUTPATIENT)
Age: 65
End: 2025-07-30

## 2025-08-26 ENCOUNTER — RESULT REVIEW (OUTPATIENT)
Age: 65
End: 2025-08-26

## 2025-08-26 ENCOUNTER — APPOINTMENT (OUTPATIENT)
Dept: RADIOLOGY | Facility: IMAGING CENTER | Age: 65
End: 2025-08-26
Payer: MEDICARE

## 2025-08-26 ENCOUNTER — OUTPATIENT (OUTPATIENT)
Dept: OUTPATIENT SERVICES | Facility: HOSPITAL | Age: 65
LOS: 1 days | End: 2025-08-26
Payer: MEDICARE

## 2025-08-26 ENCOUNTER — APPOINTMENT (OUTPATIENT)
Dept: ULTRASOUND IMAGING | Facility: IMAGING CENTER | Age: 65
End: 2025-08-26
Payer: MEDICARE

## 2025-08-26 ENCOUNTER — APPOINTMENT (OUTPATIENT)
Dept: MAMMOGRAPHY | Facility: IMAGING CENTER | Age: 65
End: 2025-08-26
Payer: MEDICARE

## 2025-08-26 DIAGNOSIS — C50.919 MALIGNANT NEOPLASM OF UNSPECIFIED SITE OF UNSPECIFIED FEMALE BREAST: ICD-10-CM

## 2025-08-26 PROCEDURE — 76641 ULTRASOUND BREAST COMPLETE: CPT | Mod: 26,50,GA

## 2025-08-26 PROCEDURE — 77067 SCR MAMMO BI INCL CAD: CPT | Mod: 26

## 2025-08-26 PROCEDURE — 77063 BREAST TOMOSYNTHESIS BI: CPT

## 2025-08-26 PROCEDURE — 77063 BREAST TOMOSYNTHESIS BI: CPT | Mod: 26

## 2025-08-26 PROCEDURE — 77085 DXA BONE DENSITY AXL VRT FX: CPT | Mod: 26

## 2025-08-26 PROCEDURE — 76641 ULTRASOUND BREAST COMPLETE: CPT

## 2025-08-26 PROCEDURE — 77067 SCR MAMMO BI INCL CAD: CPT

## 2025-08-26 PROCEDURE — 77085 DXA BONE DENSITY AXL VRT FX: CPT

## (undated) DEVICE — SOL IRR POUR H2O 250ML

## (undated) DEVICE — SYR LUER LOK 50CC

## (undated) DEVICE — DRAPE 1/2 SHEET 40X57"

## (undated) DEVICE — DRAPE TOWEL BLUE 17" X 24"

## (undated) DEVICE — MIDAS REX LEGEND TELESCOPING MATCH HEAD FLUTED 2.5MM X 12CM

## (undated) DEVICE — DRAPE MAYO STAND 30"

## (undated) DEVICE — SYR LUER LOK 10CC

## (undated) DEVICE — ELCTR BOVIE PENCIL SMOKE EVACUATION

## (undated) DEVICE — GOWN TRIMAX LG

## (undated) DEVICE — SYR LUER LOK 20CC

## (undated) DEVICE — DRSG TEGADERM 4X4.75"

## (undated) DEVICE — BIPOLAR FORCEP SYMMETRY BAYONET 7" X 1.5MM SMOOTH (SILVER)

## (undated) DEVICE — PACK LUMBAR LAMI

## (undated) DEVICE — SUT POLYSORB 0 36" GU-46

## (undated) DEVICE — PREP CHLORAPREP HI-LITE ORANGE 26ML

## (undated) DEVICE — SUT BIOSYN 4-0 18" P-12

## (undated) DEVICE — GLV 8.5 PROTEXIS (WHITE)

## (undated) DEVICE — GLV 6.5 PROTEXIS (WHITE)

## (undated) DEVICE — DRSG TAPE TRANSPORE 3"

## (undated) DEVICE — DRAPE EQUIPMENT BANDED BAG 30 X 30" (SHOWER CAP)

## (undated) DEVICE — GLV 7 PROTEXIS (WHITE)

## (undated) DEVICE — DRSG DERMABOND 0.7ML

## (undated) DEVICE — SUT VICRYL 0 18" OS-6 (POP-OFF)

## (undated) DEVICE — MIDAS REX LEGEND LUBRICANT DIFFUSER CARTRIDGE

## (undated) DEVICE — NDL SPINAL 18G X 3.5" (PINK)

## (undated) DEVICE — SOL IRR POUR NS 0.9% 500ML

## (undated) DEVICE — POSITIONER CUSHION INSERT PRONE VIEW LG

## (undated) DEVICE — ELCTR BOVIE TIP BLADE INSULATED 2.75" EDGE

## (undated) DEVICE — SPECIMEN CONTAINER 100ML

## (undated) DEVICE — DRAPE IOBAN 23" X 23"

## (undated) DEVICE — SUT VICRYL 2-0 18" CP-2 UNDYED (POP-OFF)

## (undated) DEVICE — VENODYNE/SCD SLEEVE CALF LARGE

## (undated) DEVICE — SUCTION YANKAUER NO CONTROL VENT

## (undated) DEVICE — GLV 8 PROTEXIS (WHITE)

## (undated) DEVICE — Device

## (undated) DEVICE — VISITEC 4X4

## (undated) DEVICE — DRSG TELFA 3 X 8

## (undated) DEVICE — VAGINAL PACKING 2 X 6"

## (undated) DEVICE — NDL HYPO SAFE 22G X 1.5" (BLACK)

## (undated) DEVICE — NDL HYPO SAFE 18G X 1.5" (PINK)

## (undated) DEVICE — POSITIONER FOAM EGG CRATE ULNAR 2PCS (PINK)

## (undated) DEVICE — GLV 7.5 PROTEXIS (WHITE)

## (undated) DEVICE — DRSG CURITY GAUZE SPONGE 4 X 4" 12-PLY

## (undated) DEVICE — WARMING BLANKET UPPER ADULT